# Patient Record
Sex: MALE | Race: WHITE | Employment: UNEMPLOYED | URBAN - NONMETROPOLITAN AREA
[De-identification: names, ages, dates, MRNs, and addresses within clinical notes are randomized per-mention and may not be internally consistent; named-entity substitution may affect disease eponyms.]

---

## 2017-08-09 ENCOUNTER — HOSPITAL ENCOUNTER (EMERGENCY)
Age: 57
Discharge: HOME | End: 2017-08-09
Payer: MEDICAID

## 2017-08-09 ENCOUNTER — APPOINTMENT (OUTPATIENT)
Dept: CT IMAGING | Age: 57
End: 2017-08-09
Payer: MEDICAID

## 2017-08-09 VITALS
HEIGHT: 72 IN | HEART RATE: 110 BPM | OXYGEN SATURATION: 96 % | WEIGHT: 150 LBS | SYSTOLIC BLOOD PRESSURE: 171 MMHG | RESPIRATION RATE: 18 BRPM | DIASTOLIC BLOOD PRESSURE: 106 MMHG | TEMPERATURE: 97.7 F | BODY MASS INDEX: 20.32 KG/M2

## 2017-08-09 DIAGNOSIS — F10.920 ACUTE ALCOHOLIC INTOXICATION, UNCOMPLICATED (HCC): ICD-10-CM

## 2017-08-09 DIAGNOSIS — S01.511A LIP LACERATION, INITIAL ENCOUNTER: Primary | ICD-10-CM

## 2017-08-09 DIAGNOSIS — S09.90XA HEAD INJURY, INITIAL ENCOUNTER: ICD-10-CM

## 2017-08-09 DIAGNOSIS — S09.93XA DENTAL INJURY, INITIAL ENCOUNTER: ICD-10-CM
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
## 2017-08-09 PROCEDURE — 70450 CT HEAD/BRAIN W/O DYE: CPT

## 2017-08-09 PROCEDURE — 6360000002 HC RX W HCPCS: Performed by: PHYSICIAN ASSISTANT

## 2017-08-09 PROCEDURE — 90715 TDAP VACCINE 7 YRS/> IM: CPT | Performed by: PHYSICIAN ASSISTANT

## 2017-08-09 PROCEDURE — 72125 CT NECK SPINE W/O DYE: CPT

## 2017-08-09 PROCEDURE — 90471 IMMUNIZATION ADMIN: CPT | Performed by: PHYSICIAN ASSISTANT

## 2017-08-09 PROCEDURE — 99284 EMERGENCY DEPT VISIT MOD MDM: CPT

## 2017-08-09 PROCEDURE — 2500000003 HC RX 250 WO HCPCS

## 2017-08-09 PROCEDURE — 12013 RPR F/E/E/N/L/M 2.6-5.0 CM: CPT

## 2017-08-09 RX ORDER — PENICILLIN V POTASSIUM 500 MG/1
500 TABLET ORAL 3 TIMES DAILY
Qty: 21 TABLET | Refills: 0 | Status: SHIPPED | OUTPATIENT
Start: 2017-08-09 | End: 2017-08-16

## 2017-08-09 RX ORDER — LIDOCAINE HYDROCHLORIDE 10 MG/ML
INJECTION, SOLUTION INFILTRATION; PERINEURAL
Status: COMPLETED
Start: 2017-08-09 | End: 2017-08-09

## 2017-08-09 RX ADMIN — LIDOCAINE HYDROCHLORIDE: 10 INJECTION, SOLUTION INFILTRATION; PERINEURAL at 18:40

## 2017-08-09 RX ADMIN — TETANUS TOXOID, REDUCED DIPHTHERIA TOXOID AND ACELLULAR PERTUSSIS VACCINE, ADSORBED 0.5 ML: 5; 2.5; 8; 8; 2.5 SUSPENSION INTRAMUSCULAR at 16:55

## 2017-08-09 ASSESSMENT — ENCOUNTER SYMPTOMS
VOMITING: 0
FACIAL SWELLING: 0
EYE REDNESS: 0
EYE DISCHARGE: 0
RHINORRHEA: 0
COLOR CHANGE: 0
NAUSEA: 0
COUGH: 0
STRIDOR: 0
PHOTOPHOBIA: 0
ABDOMINAL DISTENTION: 0
SHORTNESS OF BREATH: 0
DIARRHEA: 0

## 2017-08-09 ASSESSMENT — PAIN SCALES - GENERAL: PAINLEVEL_OUTOF10: 10

## 2017-08-09 ASSESSMENT — PAIN DESCRIPTION - LOCATION: LOCATION: MOUTH

## 2017-08-09 ASSESSMENT — PAIN DESCRIPTION - DESCRIPTORS: DESCRIPTORS: THROBBING

## 2017-08-09 ASSESSMENT — PAIN DESCRIPTION - PAIN TYPE: TYPE: ACUTE PAIN

## 2017-08-16 ENCOUNTER — HOSPITAL ENCOUNTER (EMERGENCY)
Age: 57
Discharge: HOME | End: 2017-08-16
Payer: MEDICAID

## 2017-08-16 VITALS
TEMPERATURE: 98 F | RESPIRATION RATE: 18 BRPM | SYSTOLIC BLOOD PRESSURE: 137 MMHG | HEART RATE: 110 BPM | DIASTOLIC BLOOD PRESSURE: 96 MMHG | OXYGEN SATURATION: 98 %

## 2017-08-16 DIAGNOSIS — Z48.02 VISIT FOR SUTURE REMOVAL: Primary | ICD-10-CM

## 2017-08-16 PROCEDURE — 99281 EMR DPT VST MAYX REQ PHY/QHP: CPT

## 2017-08-16 ASSESSMENT — ENCOUNTER SYMPTOMS
RHINORRHEA: 0
ABDOMINAL PAIN: 0
NAUSEA: 0
VOMITING: 0
DIARRHEA: 0
EYE DISCHARGE: 0
SHORTNESS OF BREATH: 0
SORE THROAT: 0
EYE REDNESS: 0
BACK PAIN: 0
COUGH: 0
WHEEZING: 0

## 2018-07-01 ENCOUNTER — HOSPITAL ENCOUNTER (EMERGENCY)
Age: 58
Discharge: HOME | End: 2018-07-01
Attending: EMERGENCY MEDICINE
Payer: COMMERCIAL

## 2018-07-01 VITALS
OXYGEN SATURATION: 98 % | SYSTOLIC BLOOD PRESSURE: 146 MMHG | TEMPERATURE: 98.1 F | RESPIRATION RATE: 16 BRPM | HEART RATE: 103 BPM | DIASTOLIC BLOOD PRESSURE: 97 MMHG

## 2018-07-01 DIAGNOSIS — L23.7 POISON IVY: Primary | ICD-10-CM

## 2018-07-01 PROCEDURE — 96372 THER/PROPH/DIAG INJ SC/IM: CPT

## 2018-07-01 PROCEDURE — 99282 EMERGENCY DEPT VISIT SF MDM: CPT

## 2018-07-01 PROCEDURE — 6360000002 HC RX W HCPCS: Performed by: EMERGENCY MEDICINE

## 2018-07-01 RX ORDER — HYDROXYZINE HYDROCHLORIDE 25 MG/1
25 TABLET, FILM COATED ORAL EVERY 6 HOURS PRN
Qty: 8 TABLET | Refills: 0 | Status: SHIPPED | OUTPATIENT
Start: 2018-07-01 | End: 2018-07-11

## 2018-07-01 RX ORDER — METHYLPREDNISOLONE SODIUM SUCCINATE 125 MG/2ML
125 INJECTION, POWDER, LYOPHILIZED, FOR SOLUTION INTRAMUSCULAR; INTRAVENOUS ONCE
Status: COMPLETED | OUTPATIENT
Start: 2018-07-01 | End: 2018-07-01

## 2018-07-01 RX ORDER — PREDNISONE 20 MG/1
20 TABLET ORAL 2 TIMES DAILY
Qty: 10 TABLET | Refills: 0 | Status: SHIPPED | OUTPATIENT
Start: 2018-07-01 | End: 2018-07-06

## 2018-07-01 RX ADMIN — METHYLPREDNISOLONE SODIUM SUCCINATE 125 MG: 125 INJECTION, POWDER, FOR SOLUTION INTRAMUSCULAR; INTRAVENOUS at 09:20

## 2018-07-01 NOTE — ED PROVIDER NOTES
appearing  oropharynx moist, no pharyngeal exudates. Neck- normal range of motion, supple   Respiratory:  No wheezing, rhonchi or rales  Cardiovascular: regular, no murmur  GI:  Non tender, no rigidity, rebound or guarding  Integument:  He has erythema with small areas of blistering on his hands particularly between the digits as well as on his face where he rubbed. Neurologic:  Alert & oriented x 3        DIAGNOSTIC RESULTS         LABS:   Labs Reviewed - No data to display    EMERGENCY DEPARTMENT COURSE:   Vitals:    Vitals:    07/01/18 0757   BP: (!) 146/97   Pulse: 103   Resp: 16   Temp: 98.1 °F (36.7 °C)   TempSrc: Oral   SpO2: 98%     He does not of any respiratory issues. There is no stridor. Posterior pharynx looks normal.  He is not having any shortness of breath or hypotension. No evidence of anaphylaxis. We will put him on some steroids for the next few days. He requested a shot while here and he received a dose of Solu-Medrol. CRITICAL CARE:   none      FINAL IMPRESSION      1. Poison ivy          DISPOSITION/PLAN   Discharged    DISCHARGE MEDICATIONS:  New Prescriptions    HYDROXYZINE (ATARAX) 25 MG TABLET    Take 1 tablet by mouth every 6 hours as needed for Itching    PREDNISONE (DELTASONE) 20 MG TABLET    Take 1 tablet by mouth 2 times daily for 5 days       (Please note that portions of this note were completed with a voice recognition program.  Efforts were made to edit the dictations but occasionally words are mis-transcribed. )    Jerry Navas    Scribe:  Jerry Navas 7/1/18 9:12 AM Scribing for and in the presence of Ronit Nielsen DO.    [unfilled]    Provider:  I personally performed the services described in the documentation, reviewed and edited the documentation which was dictated to the scribe in my presence, and it accurately records my words and actions.     Ronit Nielsen DO 07/01/18 9:24 AM        Ronit Nielsen DO  07/01/18 7045

## 2018-07-01 NOTE — ED NOTES
Patient to ed room 3. Patient complain of poison ivy, vital signs obtained and assessment completed.      Connie Khanna RN  07/01/18 6203

## 2018-08-03 ENCOUNTER — HOSPITAL ENCOUNTER (EMERGENCY)
Age: 58
Discharge: HOME | End: 2018-08-03
Payer: COMMERCIAL

## 2018-08-03 VITALS
BODY MASS INDEX: 21.67 KG/M2 | RESPIRATION RATE: 16 BRPM | WEIGHT: 160 LBS | HEIGHT: 72 IN | OXYGEN SATURATION: 97 % | SYSTOLIC BLOOD PRESSURE: 165 MMHG | HEART RATE: 115 BPM | TEMPERATURE: 97.9 F | DIASTOLIC BLOOD PRESSURE: 93 MMHG

## 2018-08-03 DIAGNOSIS — S81.812A LACERATION OF LEFT LOWER EXTREMITY, INITIAL ENCOUNTER: Primary | ICD-10-CM

## 2018-08-03 PROCEDURE — 6360000002 HC RX W HCPCS: Performed by: PHYSICIAN ASSISTANT

## 2018-08-03 PROCEDURE — 2500000003 HC RX 250 WO HCPCS: Performed by: PHYSICIAN ASSISTANT

## 2018-08-03 PROCEDURE — 90471 IMMUNIZATION ADMIN: CPT | Performed by: PHYSICIAN ASSISTANT

## 2018-08-03 PROCEDURE — 90715 TDAP VACCINE 7 YRS/> IM: CPT | Performed by: PHYSICIAN ASSISTANT

## 2018-08-03 PROCEDURE — 2709999900 HC NON-CHARGEABLE SUPPLY

## 2018-08-03 PROCEDURE — 12002 RPR S/N/AX/GEN/TRNK2.6-7.5CM: CPT

## 2018-08-03 PROCEDURE — 99282 EMERGENCY DEPT VISIT SF MDM: CPT

## 2018-08-03 RX ORDER — NAPROXEN 500 MG/1
500 TABLET ORAL 2 TIMES DAILY
Qty: 60 TABLET | Refills: 0 | Status: SHIPPED | OUTPATIENT
Start: 2018-08-03 | End: 2018-08-17

## 2018-08-03 RX ORDER — CEPHALEXIN 500 MG/1
500 CAPSULE ORAL 4 TIMES DAILY
Qty: 40 CAPSULE | Refills: 0 | Status: SHIPPED | OUTPATIENT
Start: 2018-08-03 | End: 2018-08-13

## 2018-08-03 RX ORDER — LIDOCAINE HYDROCHLORIDE AND EPINEPHRINE 10; 10 MG/ML; UG/ML
20 INJECTION, SOLUTION INFILTRATION; PERINEURAL ONCE
Status: COMPLETED | OUTPATIENT
Start: 2018-08-03 | End: 2018-08-03

## 2018-08-03 RX ADMIN — TETANUS TOXOID, REDUCED DIPHTHERIA TOXOID AND ACELLULAR PERTUSSIS VACCINE, ADSORBED 0.5 ML: 5; 2.5; 8; 8; 2.5 SUSPENSION INTRAMUSCULAR at 11:42

## 2018-08-03 RX ADMIN — LIDOCAINE HYDROCHLORIDE,EPINEPHRINE BITARTRATE 20 ML: 10; .01 INJECTION, SOLUTION INFILTRATION; PERINEURAL at 11:41

## 2018-08-03 ASSESSMENT — ENCOUNTER SYMPTOMS
VOMITING: 0
SHORTNESS OF BREATH: 0
NAUSEA: 0
BACK PAIN: 0
RHINORRHEA: 0
PHOTOPHOBIA: 0

## 2018-08-03 ASSESSMENT — PAIN SCALES - GENERAL: PAINLEVEL_OUTOF10: 0

## 2018-08-03 ASSESSMENT — PAIN DESCRIPTION - ORIENTATION: ORIENTATION: LEFT

## 2018-08-03 ASSESSMENT — PAIN DESCRIPTION - LOCATION: LOCATION: KNEE

## 2018-08-03 NOTE — ED PROVIDER NOTES
[facility]  eMERGENCY dEPARTMENT eNCOUnter          CHIEF COMPLAINT       Chief Complaint   Patient presents with    Leg Pain     left       Nurses Notes reviewed and I agree except as noted in the HPI. HISTORY OF PRESENT ILLNESS    John Blair is a 56 y.o. male who presents to the ED for evaluation of a left knee laceration. The patient was using a chainsaw to cut a bush and weeds this morning, he swiped downwards with the chainsaw and accidentally cut his left knee. The patient denies any pain associated with the laceration. He denies any other areas of pain or injury. The patient does not know when his tetanus was last updated. No additional complaints or concerns at the time of initial evaluation. Location/Symptom: laceration to left knee   Timing/Onset: 15 minutes PTA  Context/Setting: using chainsaw to cut bushes   Severity: mild      REVIEW OF SYSTEMS     Review of Systems   Constitutional: Negative for fever. HENT: Negative for rhinorrhea. Eyes: Negative for photophobia. Respiratory: Negative for shortness of breath. Cardiovascular: Negative for chest pain. Gastrointestinal: Negative for nausea and vomiting. Musculoskeletal: Negative for arthralgias, back pain, joint swelling, myalgias and neck pain. Skin: Positive for wound (laceration). Negative for rash. Neurological: Negative for weakness and numbness. Psychiatric/Behavioral: Negative for confusion. PAST MEDICAL HISTORY    has a past medical history of Arthritis and Substance induced mood disorder ([facility] [address] SURGICAL HISTORY      has a past surgical history that includes Leg Surgery and back surgery. CURRENT MEDICATIONS       Discharge Medication List as of 8/3/2018 12:37 PM          ALLERGIES     has No Known Allergies. FAMILY HISTORY     indicated that his mother is . He indicated that his father is .     family history includes Cancer in his father; Heart Attack in his 1300   BP: (!) 165/93    Pulse: 115    Resp: 16    Temp:  97.9 °F (36.6 °C)   TempSrc:  Oral   SpO2: 97%    Weight: 160 lb (72.6 kg)    Height: 6' (1.829 m)           Patient was seen and evaluation in the ED for a laceration. History and physical exam were performed. There was a 3cm laceration to the lateral aspect of the left knee with no tendon, bony, or vascular compromise. The wound was cleaned and closed with sutures. He was instructed to keep the wound clean and dry for the next 24 hours and then can clean gently with soapy water. He was instructed to follow up with his PCP in 7-10 days for suture removal. Tetanus was updated in the ED. Upon re-evaluation, the patient is feeling better with a benign repeat examination. Patient was comfortable with the plan of discharge home to followup with the primary care provider in the next 2 days. He was started on Keflex upon discharge. The patient is instructed to return to the emergency department for any worsening of their symptoms. Patient was discharged from the emergency department in good condition with all questions answered. See disposition below    Medications   lidocaine-EPINEPHrine 1 percent-[id number] injection 20 mL (20 mLs Intradermal Given by Other 8/3/18 1141)   Tetanus-Diphth-Acell Pertussis (BOOSTRIX) injection 0.5 mL (0.5 mLs Intramuscular Given 8/3/18 1142)       CRITICAL CARE:   None    CONSULTS:  None    PROCEDURES:  Lac Repair  Date/Time: 8/3/2018 11:34 AM  Performed by: Brittney Lyons  Authorized by: Brittney Lyons     Consent:     Consent obtained:  Verbal and written    Consent given by:  Patient  Anesthesia (see MAR for exact dosages):      Anesthesia method:  Local infiltration    Local anesthetic:  Lidocaine 1% WITH epi  Laceration details:     Location:  Leg    Leg location:  L knee    Length (cm):  3  Repair type:     Repair type:  Simple  Pre-procedure details:     Preparation:  Patient was prepped and draped in usual sterile fashion  Exploration:     Wound extent: no foreign bodies/material noted, no tendon damage noted and no vascular damage noted    Treatment:     Wound cleansed with: wound cleanser. Amount of cleaning:  Standard    Irrigation solution:  Sterile saline    Irrigation method:  Syringe  Skin repair:     Repair method:  Sutures    Suture size:  4-0    Suture material:  Nylon    Suture technique:  Simple interrupted    Number of sutures:  8  Approximation:     Approximation:  Close  Post-procedure details:     Dressing:  Non-adherent dressing    Patient tolerance of procedure: Tolerated well, no immediate complications               FINAL IMPRESSION      1. Laceration of left lower extremity, initial encounter          DISPOSITION/PLAN   Discharged     PATIENT REFERRED TO:  [facility] EMERGENCY DEPT  [address]  [phone]  In 12 days  For suture removal      DISCHARGE MEDICATIONS:  Discharge Medication List as of 8/3/2018 12:37 PM      START taking these medications    Details   cephALEXin (KEFLEX) 500 MG capsule Take 1 capsule by mouth 4 times daily for 10 days, Disp-40 capsule, R-0Print      naproxen (NAPROSYN) 500 MG tablet Take 1 tablet by mouth 2 times daily, Disp-60 tablet, R-0Print             (Please note that portions of this note were completed with a voice recognition program.  Efforts were made to edit the dictations but occasionally words are mis-transcribed.)      This patient was seen independently by Colleen Kwon PA-C a Mid-Level Provider in the [facility] Emergency Department. The patient was given an opportunity to see the Emergency Attending. The patient voiced understanding that I was a Mid-Level Provider and was in agreement with being seen independently by myself. Signed by: Arlen Grant, 08/03/18 2:07 PM    Scribe:  Luigi Worrell 8/3/18 11:34 AM Scribing for and in the presence of Colleen Kwon PA-C.     Provider:  I personally performed the services described in the documentation, reviewed and edited the documentation which was dictated to the scribe in my presence, and it accurately records my words and actions.     Colleen Kwon PA-C 8/3/18 2:07 PM            ZENAIDA Duggan  08/03/18 4845

## 2018-08-03 NOTE — ED NOTES
Adaptic dressing, 4x4's, kerlix, and tape applied to wound. Patient tolerated well.       Chetna Fallon RN  08/03/18 5118

## 2018-08-17 ENCOUNTER — HOSPITAL ENCOUNTER (EMERGENCY)
Age: 58
Discharge: HOME | End: 2018-08-17
Payer: COMMERCIAL

## 2018-08-17 VITALS
WEIGHT: 165 LBS | SYSTOLIC BLOOD PRESSURE: 146 MMHG | DIASTOLIC BLOOD PRESSURE: 91 MMHG | HEIGHT: 72 IN | HEART RATE: 86 BPM | BODY MASS INDEX: 22.35 KG/M2 | TEMPERATURE: 98.1 F | RESPIRATION RATE: 17 BRPM | OXYGEN SATURATION: 97 %

## 2018-08-17 DIAGNOSIS — Z48.02 VISIT FOR SUTURE REMOVAL: Primary | ICD-10-CM

## 2018-08-17 PROCEDURE — 99281 EMR DPT VST MAYX REQ PHY/QHP: CPT

## 2018-08-17 ASSESSMENT — ENCOUNTER SYMPTOMS
SORE THROAT: 0
COUGH: 0
DIARRHEA: 0
VOMITING: 0
HEMOPTYSIS: 0
NAUSEA: 0

## 2018-08-17 NOTE — ED PROVIDER NOTES
[facility]  eMERGENCY dEPARTMENT eNCOUnter          [address] [facility]       Chief Complaint   Patient presents with    Suture / Staple Removal       Nurses Notes reviewed and I agree except as noted in the HPI. HISTORY OF PRESENT ILLNESS    Cole Galeazzi is a 56 y.o. male who presents Requesting sutures removed from his left knee. They have been in for approximately 2 weeks. He has not had any issues with them      REVIEW OF SYSTEMS     Review of Systems   Constitutional: Negative for chills and fever. HENT: Negative for congestion, ear pain and sore throat. Respiratory: Negative for cough and hemoptysis. Cardiovascular: Negative for chest pain and palpitations. Gastrointestinal: Negative for diarrhea, nausea and vomiting. Genitourinary: Negative for dysuria and urgency. Musculoskeletal: Negative for myalgias and neck pain. Skin: Negative for rash. All other systems reviewed and are negative. PAST MEDICAL HISTORY    has a past medical history of Arthritis and Substance induced mood disorder ([facility] [address] SURGICAL HISTORY      has a past surgical history that includes Leg Surgery and back surgery. CURRENT MEDICATIONS       Discharge Medication List as of 2018  7:01 AM          ALLERGIES     has No Known Allergies. FAMILY HISTORY     indicated that his mother is . He indicated that his father is . family history includes Cancer in his father; Heart Attack in his mother. SOCIAL HISTORY      reports that he has been smoking. He has a 70.50 pack-year smoking history. He has never used smokeless tobacco. He reports that he drinks about 100.8 oz of alcohol per week . He reports that he does not use drugs. PHYSICAL EXAM     INITIAL VITALS:  height is 6' (1.829 m) and weight is 165 lb (74.8 kg). His oral temperature is 98.1 °F (36.7 °C). His blood pressure is 146/91 (abnormal) and his pulse is 86.  His respiration is 17 and oxygen saturation

## 2019-12-08 ENCOUNTER — HOSPITAL ENCOUNTER (EMERGENCY)
Age: 59
Discharge: HOME | End: 2019-12-08
Payer: COMMERCIAL

## 2019-12-08 ENCOUNTER — APPOINTMENT (OUTPATIENT)
Dept: GENERAL RADIOLOGY | Age: 59
End: 2019-12-08
Payer: COMMERCIAL

## 2019-12-08 VITALS
TEMPERATURE: 97.8 F | OXYGEN SATURATION: 99 % | HEART RATE: 103 BPM | SYSTOLIC BLOOD PRESSURE: 130 MMHG | DIASTOLIC BLOOD PRESSURE: 89 MMHG | RESPIRATION RATE: 16 BRPM

## 2019-12-08 DIAGNOSIS — M25.512 LEFT SHOULDER PAIN, UNSPECIFIED CHRONICITY: Primary | ICD-10-CM

## 2019-12-08 PROCEDURE — 96372 THER/PROPH/DIAG INJ SC/IM: CPT

## 2019-12-08 PROCEDURE — 99283 EMERGENCY DEPT VISIT LOW MDM: CPT

## 2019-12-08 PROCEDURE — 6360000002 HC RX W HCPCS: Performed by: NURSE PRACTITIONER

## 2019-12-08 PROCEDURE — 73030 X-RAY EXAM OF SHOULDER: CPT

## 2019-12-08 RX ORDER — ORPHENADRINE CITRATE 30 MG/ML
60 INJECTION INTRAMUSCULAR; INTRAVENOUS ONCE
Status: COMPLETED | OUTPATIENT
Start: 2019-12-08 | End: 2019-12-08

## 2019-12-08 RX ORDER — TIZANIDINE HYDROCHLORIDE 4 MG/1
4 CAPSULE, GELATIN COATED ORAL 3 TIMES DAILY PRN
Qty: 24 CAPSULE | Refills: 0 | Status: SHIPPED | OUTPATIENT
Start: 2019-12-08 | End: 2021-07-06

## 2019-12-08 RX ADMIN — ORPHENADRINE CITRATE 60 MG: 30 INJECTION INTRAMUSCULAR; INTRAVENOUS at 14:47

## 2019-12-08 ASSESSMENT — PAIN DESCRIPTION - PAIN TYPE: TYPE: CHRONIC PAIN

## 2019-12-08 ASSESSMENT — PAIN SCALES - GENERAL: PAINLEVEL_OUTOF10: 10

## 2019-12-08 ASSESSMENT — ENCOUNTER SYMPTOMS
NAUSEA: 0
VOMITING: 0
COLOR CHANGE: 0
BACK PAIN: 0

## 2019-12-08 ASSESSMENT — PAIN DESCRIPTION - LOCATION: LOCATION: SHOULDER

## 2019-12-08 ASSESSMENT — PAIN DESCRIPTION - DESCRIPTORS: DESCRIPTORS: SHARP

## 2019-12-08 ASSESSMENT — PAIN DESCRIPTION - ORIENTATION: ORIENTATION: LEFT

## 2020-01-30 ENCOUNTER — HOSPITAL ENCOUNTER (EMERGENCY)
Age: 60
Discharge: HOME | End: 2020-01-30
Payer: COMMERCIAL

## 2020-01-30 ENCOUNTER — APPOINTMENT (OUTPATIENT)
Dept: GENERAL RADIOLOGY | Age: 60
End: 2020-01-30
Payer: COMMERCIAL

## 2020-01-30 VITALS
BODY MASS INDEX: 22.38 KG/M2 | TEMPERATURE: 97.8 F | HEART RATE: 86 BPM | RESPIRATION RATE: 18 BRPM | WEIGHT: 165 LBS | DIASTOLIC BLOOD PRESSURE: 99 MMHG | SYSTOLIC BLOOD PRESSURE: 141 MMHG | OXYGEN SATURATION: 98 %

## 2020-01-30 PROCEDURE — 87186 SC STD MICRODIL/AGAR DIL: CPT

## 2020-01-30 PROCEDURE — 87075 CULTR BACTERIA EXCEPT BLOOD: CPT

## 2020-01-30 PROCEDURE — 87070 CULTURE OTHR SPECIMN AEROBIC: CPT

## 2020-01-30 PROCEDURE — 2709999900 HC NON-CHARGEABLE SUPPLY

## 2020-01-30 PROCEDURE — 87147 CULTURE TYPE IMMUNOLOGIC: CPT

## 2020-01-30 PROCEDURE — 99283 EMERGENCY DEPT VISIT LOW MDM: CPT

## 2020-01-30 PROCEDURE — 73660 X-RAY EXAM OF TOE(S): CPT

## 2020-01-30 PROCEDURE — 87205 SMEAR GRAM STAIN: CPT

## 2020-01-30 PROCEDURE — 87077 CULTURE AEROBIC IDENTIFY: CPT

## 2020-01-30 RX ORDER — CLINDAMYCIN HYDROCHLORIDE 300 MG/1
300 CAPSULE ORAL 3 TIMES DAILY
Qty: 30 CAPSULE | Refills: 0 | Status: SHIPPED | OUTPATIENT
Start: 2020-01-30 | End: 2020-02-09

## 2020-01-30 ASSESSMENT — PAIN DESCRIPTION - LOCATION: LOCATION: TOE (COMMENT WHICH ONE)

## 2020-01-30 ASSESSMENT — ENCOUNTER SYMPTOMS
NAUSEA: 0
ABDOMINAL PAIN: 0
SHORTNESS OF BREATH: 0
VOMITING: 0

## 2020-01-30 ASSESSMENT — PAIN DESCRIPTION - ORIENTATION: ORIENTATION: LEFT

## 2020-01-30 ASSESSMENT — PAIN SCALES - GENERAL: PAINLEVEL_OUTOF10: 10

## 2020-01-30 ASSESSMENT — PAIN DESCRIPTION - PAIN TYPE: TYPE: ACUTE PAIN

## 2020-01-30 NOTE — ED TRIAGE NOTES
Pt to the ED with left big toe pain. States he had a surgery on it 5 years ago and has a jaleesa in place. Notes swelling, redness, and pus d/c. Odor noted. Pain 10/10.
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
## 2020-02-03 LAB
AEROBIC CULTURE: ABNORMAL
ANAEROBIC CULTURE: ABNORMAL
GRAM STAIN RESULT: ABNORMAL
ORGANISM: ABNORMAL
ORGANISM: ABNORMAL

## 2020-02-04 NOTE — PROGRESS NOTES
Pharmacy Note  ED Culture Follow-up    Claudia Meeks is a 64 y.o. male. Allergies: Patient has no known allergies. Labs:  Lab Results   Component Value Date    BUN 5 (L) 06/27/2017    CREATININE 0.7 06/27/2017    WBC 8.0 06/27/2017     CrCl cannot be calculated (Patient's most recent lab result is older than the maximum 10 days allowed. ). Current antimicrobials:   clindamycin    ASSESSMENT:  Micro results:   Wound culture: positive for mixed growth      PLAN:  Need for intervention: No 2/2 on clindamycin   Discussed with: SUSI Kennedy  Chosen treatment:    Patient already on appropriate treatment as above    Patient response:   No need to contact patient    Called/sent in prescription to: Not applicable    Please call with any questions.  Ext. [phone]

## 2020-05-11 ENCOUNTER — HOSPITAL ENCOUNTER (EMERGENCY)
Age: 60
Discharge: HOME | End: 2020-05-11
Payer: COMMERCIAL

## 2020-05-11 VITALS
OXYGEN SATURATION: 97 % | HEART RATE: 89 BPM | TEMPERATURE: 98 F | RESPIRATION RATE: 18 BRPM | WEIGHT: 165 LBS | DIASTOLIC BLOOD PRESSURE: 97 MMHG | SYSTOLIC BLOOD PRESSURE: 156 MMHG | BODY MASS INDEX: 22.38 KG/M2

## 2020-05-11 PROCEDURE — 99282 EMERGENCY DEPT VISIT SF MDM: CPT

## 2020-05-11 RX ORDER — PERMETHRIN 50 MG/G
CREAM TOPICAL
Qty: 1 TUBE | Refills: 0 | Status: SHIPPED | OUTPATIENT
Start: 2020-05-11 | End: 2020-09-10 | Stop reason: SDUPTHER

## 2020-05-11 RX ORDER — DIPHENHYDRAMINE HCL 25 MG
25 CAPSULE ORAL EVERY 6 HOURS PRN
Qty: 30 CAPSULE | Refills: 0 | Status: SHIPPED | OUTPATIENT
Start: 2020-05-11 | End: 2021-07-06

## 2020-05-11 RX ORDER — TRIAMCINOLONE ACETONIDE 5 MG/G
CREAM TOPICAL
Qty: 1 TUBE | Refills: 0 | Status: SHIPPED | OUTPATIENT
Start: 2020-05-11 | End: 2020-05-18

## 2020-05-11 ASSESSMENT — ENCOUNTER SYMPTOMS
ABDOMINAL PAIN: 0
SORE THROAT: 0
COUGH: 0
SHORTNESS OF BREATH: 0

## 2020-05-11 ASSESSMENT — PAIN DESCRIPTION - PAIN TYPE: TYPE: ACUTE PAIN

## 2020-05-11 ASSESSMENT — PAIN DESCRIPTION - FREQUENCY: FREQUENCY: CONTINUOUS

## 2020-05-11 ASSESSMENT — PAIN SCALES - GENERAL: PAINLEVEL_OUTOF10: 10

## 2020-05-11 NOTE — ED PROVIDER NOTES
Details   tiZANidine (ZANAFLEX) 4 MG capsule Take 1 capsule by mouth 3 times daily as needed for Muscle spasms, Disp-24 capsule, R-0Print             ALLERGIES     has No Known Allergies. FAMILY HISTORY     He indicated that his mother is . He indicated that his father is . family history includes Cancer in his father; Heart Attack in his mother. SOCIAL HISTORY      reports that he has been smoking. He has a 70.50 pack-year smoking history. He has never used smokeless tobacco. He reports current alcohol use of about 168.0 standard drinks of alcohol per week. He reports that he does not use drugs. PHYSICAL EXAM     INITIAL VITALS:  weight is 165 lb (74.8 kg). His oral temperature is 98 °F (36.7 °C). His blood pressure is 156/97 (abnormal) and his pulse is 89. His respiration is 18 and oxygen saturation is 97%. Physical Exam  Vitals signs and nursing note reviewed. Constitutional:       Appearance: Normal appearance. HENT:      Head: Normocephalic and atraumatic. Eyes:      Conjunctiva/sclera: Conjunctivae normal.   Pulmonary:      Effort: Pulmonary effort is normal. No respiratory distress. Skin:     General: Skin is warm and dry. Comments: Multiple excoriated, papular wounds noted to the upper back, neck, cheeks and lower scalp. There is no linear streaking, visible tunneling, or scaling associated with the areas. No sign of secondary cellulitis. Neurological:      General: No focal deficit present. Mental Status: He is alert and oriented to person, place, and time.    Psychiatric:         Mood and Affect: Mood normal.         Behavior: Behavior normal.             DIFFERENTIAL DIAGNOSIS:   Differential diagnoses are discussed    DIAGNOSTIC RESULTS     EKG: All EKG's are interpreted by the Emergency Department Physician who either signs or Co-signsthis chart in the absence of a cardiologist.          RADIOLOGY: non-plain film images(s) such as CT, Ultrasound and MRI are read by the radiologist.    No orders to display       LABS:    Labs Reviewed - No data to display    EMERGENCY DEPARTMENT COURSE:   Vitals:    Vitals:    05/11/20 1259   BP: (!) 156/97   Pulse: 89   Resp: 18   Temp: 98 °F (36.7 °C)   TempSrc: Oral   SpO2: 97%   Weight: 165 lb (74.8 kg)      3:09 PM EDT: The patient was seen and evaluated. Patient presents for complaints of rash that has been ongoing for at least 1 month. No treatment prior to today and he has not been seen for evaluation prior to today. No significant change today, it has just been progressively getting worse. It appears consistent with bug bites on evaluation. Will treat with Benadryl, Kenalog and also provide scabies treatment. He is provided with contact information to establish primary care provider, declined having an appointment scheduled for him. Return precautions were discussed. Hygiene practices discussed. He denied further needs upon discharge. CRITICAL CARE:   None    CONSULTS:  None     PROCEDURES:  None    FINAL IMPRESSION      1. Bug bite, initial encounter          DISPOSITION/PLAN   Discharge    PATIENT REFERRED TO:  [address] [place] [address],[address]  Call in 1 day  As needed    [address] [facility] [address] [address] EMERGENCY DEPT  [address]    If symptoms worsen      DISCHARGEMEDICATIONS:  Discharge Medication List as of 5/11/2020  1:21 PM      START taking these medications    Details   permethrin (ELIMITE) 5 % cream Apply topically as directed, Disp-1 Tube, R-0, Print      triamcinolone (ARISTOCORT) 0.5 % cream Apply topically 3 times daily. , Disp-1 Tube, R-0, Print      diphenhydrAMINE (BENADRYL ALLERGY) 25 MG capsule Take 1 capsule by mouth every 6 hours as needed for Itching, Disp-30 capsule, R-0Print             (Please note that portions of this note were completedwith a voice recognition program.  Efforts were made to edit the dictations but occasionally words are mis-transcribed.)       Asha Hernandez PA-C  05/11/20 1395

## 2020-09-10 ENCOUNTER — HOSPITAL ENCOUNTER (EMERGENCY)
Age: 60
Discharge: HOME | End: 2020-09-10
Attending: EMERGENCY MEDICINE
Payer: COMMERCIAL

## 2020-09-10 VITALS
BODY MASS INDEX: 21.67 KG/M2 | DIASTOLIC BLOOD PRESSURE: 90 MMHG | TEMPERATURE: 98.1 F | WEIGHT: 160 LBS | SYSTOLIC BLOOD PRESSURE: 184 MMHG | HEART RATE: 93 BPM | RESPIRATION RATE: 18 BRPM | OXYGEN SATURATION: 98 % | HEIGHT: 72 IN

## 2020-09-10 LAB
ALBUMIN SERPL-MCNC: 4.4 G/DL (ref 3.5–5.1)
ALP BLD-CCNC: 61 U/L (ref 38–126)
ALT SERPL-CCNC: 80 U/L (ref 11–66)
ANION GAP SERPL CALCULATED.3IONS-SCNC: 13 MEQ/L (ref 8–16)
APTT: 32.8 SECONDS (ref 22–38)
AST SERPL-CCNC: 152 U/L (ref 5–40)
BASOPHILS # BLD: 1.5 %
BASOPHILS ABSOLUTE: 0.1 THOU/MM3 (ref 0–0.1)
BILIRUB SERPL-MCNC: 0.6 MG/DL (ref 0.3–1.2)
BUN BLDV-MCNC: < 2 MG/DL (ref 7–22)
CALCIUM SERPL-MCNC: 9.7 MG/DL (ref 8.5–10.5)
CHLORIDE BLD-SCNC: 98 MEQ/L (ref 98–111)
CO2: 24 MEQ/L (ref 23–33)
CREAT SERPL-MCNC: 0.6 MG/DL (ref 0.4–1.2)
EOSINOPHIL # BLD: 4.6 %
EOSINOPHILS ABSOLUTE: 0.3 THOU/MM3 (ref 0–0.4)
ERYTHROCYTE [DISTWIDTH] IN BLOOD BY AUTOMATED COUNT: 12.4 % (ref 11.5–14.5)
ERYTHROCYTE [DISTWIDTH] IN BLOOD BY AUTOMATED COUNT: 46.9 FL (ref 35–45)
GFR SERPL CREATININE-BSD FRML MDRD: > 90 ML/MIN/1.73M2
GLUCOSE BLD-MCNC: 96 MG/DL (ref 70–108)
HCT VFR BLD CALC: 45.1 % (ref 42–52)
HEMOGLOBIN: 15.9 GM/DL (ref 14–18)
IMMATURE GRANS (ABS): 0.04 THOU/MM3 (ref 0–0.07)
IMMATURE GRANULOCYTES: 0.6 %
LYMPHOCYTES # BLD: 23.9 %
LYMPHOCYTES ABSOLUTE: 1.6 THOU/MM3 (ref 1–4.8)
MCH RBC QN AUTO: 36.3 PG (ref 26–33)
MCHC RBC AUTO-ENTMCNC: 35.3 GM/DL (ref 32.2–35.5)
MCV RBC AUTO: 103 FL (ref 80–94)
MONOCYTES # BLD: 18.8 %
MONOCYTES ABSOLUTE: 1.3 THOU/MM3 (ref 0.4–1.3)
NUCLEATED RED BLOOD CELLS: 0 /100 WBC
OSMOLALITY CALCULATION: 266.1 MOSMOL/KG (ref 275–300)
PLATELET # BLD: 127 THOU/MM3 (ref 130–400)
PMV BLD AUTO: 10 FL (ref 9.4–12.4)
POTASSIUM SERPL-SCNC: 4.4 MEQ/L (ref 3.5–5.2)
RBC # BLD: 4.38 MILL/MM3 (ref 4.7–6.1)
SEG NEUTROPHILS: 50.6 %
SEGMENTED NEUTROPHILS ABSOLUTE COUNT: 3.4 THOU/MM3 (ref 1.8–7.7)
SODIUM BLD-SCNC: 135 MEQ/L (ref 135–145)
TOTAL PROTEIN: 8.4 G/DL (ref 6.1–8)
WBC # BLD: 6.8 THOU/MM3 (ref 4.8–10.8)

## 2020-09-10 PROCEDURE — 99284 EMERGENCY DEPT VISIT MOD MDM: CPT

## 2020-09-10 PROCEDURE — 36415 COLL VENOUS BLD VENIPUNCTURE: CPT

## 2020-09-10 PROCEDURE — 85025 COMPLETE CBC W/AUTO DIFF WBC: CPT

## 2020-09-10 PROCEDURE — 85730 THROMBOPLASTIN TIME PARTIAL: CPT

## 2020-09-10 PROCEDURE — 80053 COMPREHEN METABOLIC PANEL: CPT

## 2020-09-10 PROCEDURE — 99283 EMERGENCY DEPT VISIT LOW MDM: CPT

## 2020-09-10 RX ORDER — IVERMECTIN 3 MG/1
200 TABLET ORAL ONCE
Qty: 4 TABLET | Refills: 0 | Status: SHIPPED | OUTPATIENT
Start: 2020-09-10 | End: 2020-09-10

## 2020-09-10 RX ORDER — PERMETHRIN 50 MG/G
CREAM TOPICAL
Qty: 1 TUBE | Refills: 0 | Status: SHIPPED | OUTPATIENT
Start: 2020-09-10 | End: 2021-07-06

## 2020-09-10 RX ORDER — IVERMECTIN 3 MG/1
200 TABLET ORAL ONCE
Status: DISCONTINUED | OUTPATIENT
Start: 2020-09-10 | End: 2020-09-10 | Stop reason: RX

## 2020-09-10 ASSESSMENT — ENCOUNTER SYMPTOMS
EYE REDNESS: 0
SHORTNESS OF BREATH: 0
NAUSEA: 0
BACK PAIN: 0
COUGH: 0
VOMITING: 0
SINUS PAIN: 0
BLOOD IN STOOL: 1
RHINORRHEA: 0
DIARRHEA: 0
ABDOMINAL PAIN: 0
SORE THROAT: 0

## 2020-09-10 NOTE — ED PROVIDER NOTES
[address] [facility]  EMERGENCY MEDICINE ATTENDING ATTESTATION      Evaluation of Rubén Harrison. Case discussed and care plan developed with resident physician. I agree with the note and plan as documented by him, except if my documentation differs. Patient seen and examined by me. I reviewed the medical, surgical, family and social history, medications and allergies. I have reviewed the nursing documentation. I have reviewed the patient's vital signs and are abnormal from Hypertension per my interpretation. Pulsoxymetry is normal per my interpretation. Brief H&P   Patient c/o 1 episode of bloody bowel movement earlier today. Patient's main reason to come to the hospital is a generalized pruritic rash that has been treated for scabies in the past but he has not treated his clothes. Physical exam is notable for well appearing, Abdomen is soft, non-tender, non-distended, BS positive in 4 quadrants, no HSM, no masses. Generalized pruritic rash with excoriations with micropapules. Medical Decision Making   MDM: Hematochezia, scabies. Plan: Symptomatic treatment, will re-treat for scabies and advised to properly treat his clothing and bed sheets. Please see the resident physician completed note for final disposition except as documented on this attestation. Diagnosis, treatment and disposition plans were discussed and agreed upon by patient. This transcription was electronically signed. It was dictated by use of voice recognition software and electronically transcribed. The transcription may contain errors not detected in proofreading.        Electronically signed by Eleuterio Becker MD on 9/10/20 at 3:49 PM EDT       Eleuterio Becker MD  09/10/20 9453

## 2020-09-10 NOTE — ED PROVIDER NOTES
[place] ENCOUNTER          Pt Name: Rosario Agustin  MRN: [id number]  [place] 1960  Date of evaluation: 9/10/2020  Treating Resident Physician: Hugo Booth MD  Supervising Physician: Dr. Dorita Ag       Chief Complaint   Patient presents with    Rectal Bleeding     X1 year     Rash     Dx with \"scabies\" one month ago      History obtained from the patient. HISTORY OF PRESENT ILLNESS    HPI  Rosario Agustin is a 64 y.o. male who presents to the emergency department for evaluation of his episode of GI bleed that occurred this morning. Patient states the blood was noted to be on toilet paper as well as large amounts in the toilet and mixed in with the stool. Patient states he has had incidence of this over the past year but has not told anyone. He denies feeling any lightheadedness, dizzy or vision changes. Patient however does mention having significant complaints regarding his scabies that was diagnosed back in May and managed with permethrin cream and triamcinolone and states after only some minimal relief it has now returned over the past month and is significantly worse. It is present on bilateral upper extremities back and neck. The patient has no other acute complaints at this time. REVIEW OF SYSTEMS   Review of Systems   Constitutional: Negative for chills and fever. HENT: Negative for rhinorrhea, sinus pain and sore throat. Eyes: Negative for redness. Respiratory: Negative for cough and shortness of breath. Cardiovascular: Negative for chest pain. Gastrointestinal: Positive for blood in stool. Negative for abdominal pain, diarrhea, nausea and vomiting. Genitourinary: Negative for dysuria. Musculoskeletal: Negative for back pain. Skin: Positive for rash. Neurological: Negative for dizziness, light-headedness and headaches. Psychiatric/Behavioral: Negative for agitation. PAST MEDICAL AND SURGICAL HISTORY     Past Medical History:   Diagnosis Date    Arthritis     Substance induced mood disorder ([facility] [address]      Past Surgical History:   Procedure Laterality Date    LEG SURGERY           MEDICATIONS   No current facility-administered medications for this encounter. Current Outpatient Medications:     permethrin (ELIMITE) 5 % cream, Apply topically as directed, Disp: 1 Tube, Rfl: 0    ivermectin 3 MG tablet, Take 5 tablets by mouth once for 1 dose, Disp: 4 tablet, Rfl: 0    diphenhydrAMINE (BENADRYL ALLERGY) 25 MG capsule, Take 1 capsule by mouth every 6 hours as needed for Itching, Disp: 30 capsule, Rfl: 0    tiZANidine (ZANAFLEX) 4 MG capsule, Take 1 capsule by mouth 3 times daily as needed for Muscle spasms, Disp: 24 capsule, Rfl: 0      SOCIAL HISTORY     Social History     Social History Narrative    Not on file     Social History     Tobacco Use    Smoking status: Current Every Day Smoker     Packs/day: 1.50     Years: 47.00     Pack years: 70.50    Smokeless tobacco: Never Used   Substance Use Topics    Alcohol use: Yes     Comment: 12 cans beer/day     Drug use: No     Frequency: 1.0 times per week     Types: Cocaine         ALLERGIES   No Known Allergies      FAMILY HISTORY     Family History   Problem Relation Age of Onset    Heart Attack Mother     Cancer Father          PREVIOUS RECORDS   Previous records reviewed: Patient was seen here 5/11/2020 for a bug bite. Collette Ruts PHYSICAL EXAM     ED Triage Vitals [09/10/20 1425]   BP Temp Temp Source Pulse Resp SpO2 Height Weight   (!) 184/90 98.1 °F (36.7 °C) Oral 93 18 98 % 6' (1.829 m) 160 lb (72.6 kg)     Initial vital signs and nursing assessment reviewed and normal. Pulsoximetry is normal per my interpretation. Additional Vital Signs:  Vitals:    09/10/20 1425   BP: (!) 184/90   Pulse: 93   Resp: 18   Temp: 98.1 °F (36.7 °C)   SpO2: 98%       Physical Exam  Vitals signs and nursing note reviewed. Constitutional:       Appearance: Normal appearance. He is normal weight. He is not ill-appearing or toxic-appearing. HENT:      Head: Normocephalic and atraumatic. Right Ear: External ear normal.      Left Ear: External ear normal.      Nose: Nose normal. No congestion. Mouth/Throat:      Mouth: Mucous membranes are moist.      Pharynx: Oropharynx is clear. Eyes:      Conjunctiva/sclera: Conjunctivae normal.   Neck:      Musculoskeletal: Normal range of motion and neck supple. No neck rigidity or muscular tenderness. Cardiovascular:      Rate and Rhythm: Normal rate and regular rhythm. Pulses: Normal pulses. Heart sounds: Normal heart sounds. No murmur. Pulmonary:      Effort: Pulmonary effort is normal. No respiratory distress. Breath sounds: Normal breath sounds. No stridor. No wheezing, rhonchi or rales. Chest:      Chest wall: No tenderness. Abdominal:      General: Abdomen is flat. There is no distension. Palpations: Abdomen is soft. Tenderness: There is no abdominal tenderness. There is no guarding or rebound. Musculoskeletal: Normal range of motion. Skin:     General: Skin is warm. Capillary Refill: Capillary refill takes less than 2 seconds. Findings: Rash present. Comments: Diffuse satellite lesions with excoriations noted throughout bilateral upper extremities including the interdigit spaces of his fingers. There are a few lesions that are excoriated and scratched on the back of patient's neck going to the base of his hairline as well on his upper back. The left elbow is more diffusely excoriated and is nearly scarred in nature for diffuse mechanical rubbing   Neurological:      General: No focal deficit present. Mental Status: He is alert and oriented to person, place, and time. Psychiatric:         Mood and Affect: Mood normal.         MEDICAL DECISION MAKING   Initial Assessment:  This is a 62-year-old male with a history of alcoholism who presents for one episode of GI bleed this morning as well as continued issues with scabies. He was seen on 5/11/2020 diagnosis scabies and started on permethrin cream as well as triamcinolone and Benadryl however states there is only some minimal relief with those medications at that time and has now progressed and is present on bilateral upper extremities upper chest as well as upper back and neck. He denies any symptomatology from his GI bleed and states he has had incidents of this over the past year however has not told anyone. Patient states his last drink was 1400 and that he does not have a history of alcohol withdrawal seizures. Differential Diagnosis Included but not limited to: Scabies, diverticular bleed, internal hemorrhoid, fistula, peptic ulcer    Plan: We will obtain lab work studies and will also perform a rectal exam to assess for possible sources from his bleeding. Given home prescription for permethrin cream.  Ivermectine was unable to be given here due to not being in our pharmacy he will be given a prescription for this as well. He has been given a primary care physician as well as gastroenterology referral follow-up with.       ED RESULTS   Laboratory results:  Labs Reviewed   COMPREHENSIVE METABOLIC PANEL - Abnormal; Notable for the following components:       Result Value    BUN <2 (*)      (*)     Total Protein 8.4 (*)     ALT 80 (*)     All other components within normal limits   CBC WITH AUTO DIFFERENTIAL - Abnormal; Notable for the following components:    RBC 4.38 (*)     .0 (*)     MCH 36.3 (*)     RDW-SD 46.9 (*)     Platelets 898 (*)     All other components within normal limits   OSMOLALITY - Abnormal; Notable for the following components:    Osmolality Calc 266.1 (*)     All other components within normal limits   APTT   ANION GAP   GLOMERULAR FILTRATION RATE, ESTIMATED       Radiologic studies results:  No orders to display       ED Medications administered this visit: Medications - No data to display      ED COURSE      Strict return precautions and follow up instructions were discussed with the patient prior to discharge, with which the patient agrees. MEDICATION CHANGES     New Prescriptions    IVERMECTIN 3 MG TABLET    Take 5 tablets by mouth once for 1 dose         FINAL DISPOSITION     Final diagnoses:   Scabies   Internal hemorrhoids     Condition: condition: good  Dispo: Discharge to home      This transcription was electronically signed. Parts of this transcriptions may have been dictated by use of voice recognition software and electronically transcribed, and parts may have been transcribed with the assistance of an ED scribe. The transcription may contain errors not detected in proofreading. Please refer to my supervising physician's documentation if my documentation differs.     Electronically Signed: Nicho Rivera, 09/10/20, 4:13 PM          Nicho Rivera MD  Resident  09/10/20 0813

## 2020-09-10 NOTE — ED NOTES
Pt presents to ED with complaint of blood in stool that has been ongoing for approximately one year. Pt states no pain and states he does not know shade of color of blood in stool, just states \"red\". Pt also states itching from previous diagnosis of \"scabies\" per pt from one month ago in ED. Pt states difficulty sleeping due to itching. Pt states consumption of 12 cans beer/day, and states he has consumed 6 cans today. Pt states no desire to stop drinking or receive resources at this time. Pt vital signs stable, pt does not appear to be in acute distress at this time.        [facility]  09/10/20 6100

## 2021-05-20 ENCOUNTER — APPOINTMENT (OUTPATIENT)
Dept: CT IMAGING | Age: 61
End: 2021-05-20
Payer: COMMERCIAL

## 2021-05-20 ENCOUNTER — HOSPITAL ENCOUNTER (EMERGENCY)
Age: 61
Discharge: HOME | End: 2021-05-20
Payer: COMMERCIAL

## 2021-05-20 VITALS
OXYGEN SATURATION: 98 % | HEIGHT: 72 IN | TEMPERATURE: 98 F | HEART RATE: 93 BPM | WEIGHT: 165 LBS | RESPIRATION RATE: 14 BRPM | BODY MASS INDEX: 22.35 KG/M2 | SYSTOLIC BLOOD PRESSURE: 161 MMHG | DIASTOLIC BLOOD PRESSURE: 99 MMHG

## 2021-05-20 DIAGNOSIS — S09.90XA INJURY OF HEAD, INITIAL ENCOUNTER: Primary | ICD-10-CM
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
## 2021-05-20 DIAGNOSIS — S01.81XA LACERATION OF FOREHEAD, INITIAL ENCOUNTER: ICD-10-CM

## 2021-05-20 LAB
ANION GAP SERPL CALCULATED.3IONS-SCNC: 15 MEQ/L (ref 8–16)
BASOPHILS # BLD: 1.4 %
BASOPHILS ABSOLUTE: 0.1 THOU/MM3 (ref 0–0.1)
BUN BLDV-MCNC: 4 MG/DL (ref 7–22)
CALCIUM SERPL-MCNC: 9.1 MG/DL (ref 8.5–10.5)
CHLORIDE BLD-SCNC: 99 MEQ/L (ref 98–111)
CO2: 22 MEQ/L (ref 23–33)
CREAT SERPL-MCNC: 0.6 MG/DL (ref 0.4–1.2)
EOSINOPHIL # BLD: 3.8 %
EOSINOPHILS ABSOLUTE: 0.2 THOU/MM3 (ref 0–0.4)
ERYTHROCYTE [DISTWIDTH] IN BLOOD BY AUTOMATED COUNT: 14.3 % (ref 11.5–14.5)
ERYTHROCYTE [DISTWIDTH] IN BLOOD BY AUTOMATED COUNT: 51.5 FL (ref 35–45)
ETHYL ALCOHOL, SERUM: 0.26 %
GFR SERPL CREATININE-BSD FRML MDRD: > 90 ML/MIN/1.73M2
GLUCOSE BLD-MCNC: 81 MG/DL (ref 70–108)
HCT VFR BLD CALC: 45.9 % (ref 42–52)
HEMOGLOBIN: 16 GM/DL (ref 14–18)
IMMATURE GRANS (ABS): 0.03 THOU/MM3 (ref 0–0.07)
IMMATURE GRANULOCYTES: 0.5 %
LYMPHOCYTES # BLD: 35.2 %
LYMPHOCYTES ABSOLUTE: 2.1 THOU/MM3 (ref 1–4.8)
MCH RBC QN AUTO: 34.1 PG (ref 26–33)
MCHC RBC AUTO-ENTMCNC: 34.9 GM/DL (ref 32.2–35.5)
MCV RBC AUTO: 97.9 FL (ref 80–94)
MONOCYTES # BLD: 13.3 %
MONOCYTES ABSOLUTE: 0.8 THOU/MM3 (ref 0.4–1.3)
NUCLEATED RED BLOOD CELLS: 0 /100 WBC
OSMOLALITY CALCULATION: 267.9 MOSMOL/KG (ref 275–300)
PLATELET # BLD: 149 THOU/MM3 (ref 130–400)
PMV BLD AUTO: 9.5 FL (ref 9.4–12.4)
POTASSIUM REFLEX MAGNESIUM: 4 MEQ/L (ref 3.5–5.2)
RBC # BLD: 4.69 MILL/MM3 (ref 4.7–6.1)
SEG NEUTROPHILS: 45.8 %
SEGMENTED NEUTROPHILS ABSOLUTE COUNT: 2.7 THOU/MM3 (ref 1.8–7.7)
SODIUM BLD-SCNC: 136 MEQ/L (ref 135–145)
WBC # BLD: 5.9 THOU/MM3 (ref 4.8–10.8)

## 2021-05-20 PROCEDURE — 70450 CT HEAD/BRAIN W/O DYE: CPT

## 2021-05-20 PROCEDURE — 6360000002 HC RX W HCPCS: Performed by: NURSE PRACTITIONER

## 2021-05-20 PROCEDURE — 82077 ASSAY SPEC XCP UR&BREATH IA: CPT

## 2021-05-20 PROCEDURE — 90471 IMMUNIZATION ADMIN: CPT | Performed by: NURSE PRACTITIONER

## 2021-05-20 PROCEDURE — 36415 COLL VENOUS BLD VENIPUNCTURE: CPT

## 2021-05-20 PROCEDURE — 12013 RPR F/E/E/N/L/M 2.6-5.0 CM: CPT

## 2021-05-20 PROCEDURE — 90715 TDAP VACCINE 7 YRS/> IM: CPT | Performed by: NURSE PRACTITIONER

## 2021-05-20 PROCEDURE — 72125 CT NECK SPINE W/O DYE: CPT

## 2021-05-20 PROCEDURE — 80048 BASIC METABOLIC PNL TOTAL CA: CPT

## 2021-05-20 PROCEDURE — 99282 EMERGENCY DEPT VISIT SF MDM: CPT

## 2021-05-20 PROCEDURE — 85025 COMPLETE CBC W/AUTO DIFF WBC: CPT

## 2021-05-20 RX ORDER — LIDOCAINE HYDROCHLORIDE 10 MG/ML
INJECTION, SOLUTION INFILTRATION; PERINEURAL
Status: DISCONTINUED
Start: 2021-05-20 | End: 2021-05-20 | Stop reason: HOSPADM

## 2021-05-20 RX ORDER — 0.9 % SODIUM CHLORIDE 0.9 %
1000 INTRAVENOUS SOLUTION INTRAVENOUS ONCE
Status: DISCONTINUED | OUTPATIENT
Start: 2021-05-20 | End: 2021-05-20

## 2021-05-20 RX ADMIN — TETANUS TOXOID, REDUCED DIPHTHERIA TOXOID AND ACELLULAR PERTUSSIS VACCINE, ADSORBED 0.5 ML: 5; 2.5; 8; 8; 2.5 SUSPENSION INTRAMUSCULAR at 14:57

## 2021-05-20 ASSESSMENT — ENCOUNTER SYMPTOMS
VOMITING: 0
NAUSEA: 0
SHORTNESS OF BREATH: 0
BACK PAIN: 1

## 2021-05-20 ASSESSMENT — PAIN DESCRIPTION - DESCRIPTORS: DESCRIPTORS: ACHING

## 2021-05-20 ASSESSMENT — PAIN SCALES - GENERAL: PAINLEVEL_OUTOF10: 8

## 2021-05-20 ASSESSMENT — PAIN DESCRIPTION - LOCATION: LOCATION: GENERALIZED

## 2021-05-20 NOTE — ED PROVIDER NOTES
[facility] COMPLAINT       Chief Complaint   Patient presents with    Head Laceration     left forehead    Fall     down 15 steps       Nurses Notes reviewed and I agree except as noted in the HPI. HISTORY OF PRESENT ILLNESS    Tejinder Yang is a 64 y.o. male who presents to the Emergency Department for the evaluation of a left forehead patient was he sustained just PTA. Pt admits to drinking 4 tall boy beers today. States he lost his footing and fell down 10-15 stairs inside at home. He notes that ladder fell onto him and struck him in the head. No LOC. Pt was able to ambulate without difficulty following the injury. He c/o middle-lower back pain and a mild HA as well. Pt admits to daily EtOH consumption. He is not on anticoagulants. Denies neck pain, vomiting, dizziness, acute vision changes, and any other complaints at this time. The HPI was provided by the patient. REVIEW OF SYSTEMS     Review of Systems   Constitutional: Negative for chills and fever. Eyes: Negative for visual disturbance. Respiratory: Negative for shortness of breath. Cardiovascular: Negative for chest pain. Gastrointestinal: Negative for nausea and vomiting. Musculoskeletal: Positive for back pain. Negative for neck pain and neck stiffness. Skin: Positive for wound. Neurological: Positive for headaches. Negative for dizziness. PAST MEDICAL HISTORY    has a past medical history of Arthritis and Substance induced mood disorder ([address] SURGICAL HISTORY      has a past surgical history that includes Leg Surgery.     CURRENT MEDICATIONS       Discharge Medication List as of 5/20/2021  3:30 PM      CONTINUE these medications which have NOT CHANGED    Details   permethrin (ELIMITE) 5 % cream Apply topically as directed, Disp-1 Tube,R-0, Print      diphenhydrAMINE (BENADRYL ALLERGY) 25 MG capsule Take 1 capsule by mouth every 6 hours as needed for Itching, Disp-30 capsule, R-0Print      tiZANidine (ZANAFLEX) 4 MG capsule Take 1 capsule by mouth 3 times daily as needed for Muscle spasms, Disp-24 capsule, R-0Print             ALLERGIES     has No Known Allergies. FAMILY HISTORY     He indicated that his mother is . He indicated that his father is . family history includes Cancer in his father; Heart Attack in his mother. SOCIAL HISTORY      reports that he has been smoking. He has a 70.50 pack-year smoking history. He has never used smokeless tobacco. He reports current alcohol use. He reports that he does not use drugs. PHYSICAL EXAM     INITIAL VITALS:  height is 6' (1.829 m) and weight is 165 lb (74.8 kg). His oral temperature is 98 °F (36.7 °C). His blood pressure is 161/99 (abnormal) and his pulse is 93. His respiration is 14 and oxygen saturation is 98%. Physical Exam  Vitals and nursing note reviewed. Constitutional:       General: He is awake. He is not in acute distress. Appearance: Normal appearance. He is well-developed and normal weight. He is not ill-appearing, toxic-appearing or diaphoretic. HENT:      Head: Normocephalic. Comments: Approximately 3 cm laceration just superior to the left eyebrow. Nose: Nose normal.      Mouth/Throat:      Mouth: Mucous membranes are moist.      Pharynx: Oropharynx is clear. Eyes:      Extraocular Movements: Extraocular movements intact. Pupils: Pupils are equal, round, and reactive to light. Cardiovascular:      Rate and Rhythm: Normal rate and regular rhythm. No extrasystoles are present. Pulses: Normal pulses. Heart sounds: Normal heart sounds, S1 normal and S2 normal. Heart sounds not distant. No murmur heard. No friction rub. No gallop. Pulmonary:      Effort: Pulmonary effort is normal. No tachypnea, bradypnea, accessory muscle usage, prolonged expiration, respiratory distress or retractions.       Breath sounds: Normal breath sounds. No stridor. No wheezing, rhonchi or rales. Chest:      Chest wall: No tenderness. Abdominal:      General: Abdomen is flat. Bowel sounds are normal. There is no distension. Palpations: Abdomen is soft. There is no shifting dullness, hepatomegaly, splenomegaly or mass. Tenderness: There is no abdominal tenderness. Hernia: No hernia is present. Musculoskeletal:         General: No swelling, deformity or signs of injury. Normal range of motion. Cervical back: Normal range of motion and neck supple. No rigidity. No muscular tenderness. Right lower leg: No edema. Left lower leg: No edema. Comments: Diffuse tenderness to the thoracic and lumbar spine. No bony tenderness. Skin:     General: Skin is warm and dry. Capillary Refill: Capillary refill takes less than 2 seconds. Coloration: Skin is not jaundiced or pale. Neurological:      General: No focal deficit present. Mental Status: He is alert and oriented to person, place, and time. Mental status is at baseline. GCS: GCS eye subscore is 4. GCS verbal subscore is 5. GCS motor subscore is 6. Cranial Nerves: Cranial nerves are intact. Sensory: Sensation is intact. Psychiatric:         Mood and Affect: Mood normal.         Behavior: Behavior normal. Behavior is cooperative. DIFFERENTIAL DIAGNOSIS:   Fall, head laceration, concussion, lumbar strain, ICH, skull fracture, acute alcohol intoxication    DIAGNOSTIC RESULTS     EKG: All EKG's are interpreted by the Emergency Department Physician who either signs or Co-signs this chart in the absence of a cardiologist.    None    RADIOLOGY: non-plainfilm images(s) such as CT, Ultrasound and MRI are read by the radiologist.    CT Head WO Contrast   Final Result       1. Possible hematoma in the scalp overlying the left frontal calvarium. 2. Mild atrophy and dilatation of the third and lateral ventricles.    3. Probable ischemic changes in the white matter. 4. No evidence of intracranial hemorrhage. 5. Mild inflammatory changes in the ethmoid air cells bilaterally. .               **This report has been created using voice recognition software. It may contain minor errors which are inherent in voice recognition technology. **      Final report electronically signed by DR Fabiola Ivey on 5/20/2021 2:52 PM      CT Cervical Spine WO Contrast   Final Result   Multilevel degenerative changes with no acute fracture. **This report has been created using voice recognition software. It may contain minor errors which are inherent in voice recognition technology. **      Final report electronically signed by Dr Loren Schroeder on 5/20/2021 2:56 PM          LABS:     Labs Reviewed   CBC WITH AUTO DIFFERENTIAL - Abnormal; Notable for the following components:       Result Value    RBC 4.69 (*)     MCV 97.9 (*)     MCH 34.1 (*)     RDW-SD 51.5 (*)     All other components within normal limits   BASIC METABOLIC PANEL W/ REFLEX TO MG FOR LOW K - Abnormal; Notable for the following components:    CO2 22 (*)     BUN 4 (*)     All other components within normal limits   OSMOLALITY - Abnormal; Notable for the following components:    Osmolality Calc 267.9 (*)     All other components within normal limits   ETHANOL   ANION GAP   GLOMERULAR FILTRATION RATE, ESTIMATED       EMERGENCY DEPARTMENT COURSE:   Vitals:    Vitals:    05/20/21 1327   BP: (!) 161/99   Pulse: 93   Resp: 14   Temp: 98 °F (36.7 °C)   TempSrc: Oral   SpO2: 98%   Weight: 165 lb (74.8 kg)   Height: 6' (1.829 m)       1:23 PM EDT: The patient was seen and evaluated. MDM:  Patient evaluated for fall, head laceration, back pain. Denies loss of consciousness. He admits to drinking today, ethanol checked and found to be 0.26. CT imaging completed showing no acute intracranial hemorrhage or osseous abnormality. Forehead wound was closed, see procedure documentation for closure details. EMERGENCY DEPT  [address] [facility]  [phone]  Go in 1 week  For suture removal      DISCHARGE MEDICATIONS:  Discharge Medication List as of 5/20/2021  3:30 PM          (Please note that portions of this note were completed with a voice recognition program.  Efforts were made to edit the dictations but occasionally words are mis-transcribed.)    The patient was given an opportunity to see the Emergency Attending. The patient voiced understanding that I was a Mid-LevelProvider and was in agreement with being seen independently by myself. Provider:  I personally performed the services described in the documentation, reviewed and edited the documentation which was dictated to the scribe in my presence, and it accurately records my words and actions.     SUSI Belcher CNP, 5/20/21, 12:08 PM       SUSI Belcher CNP  05/23/21 [id number]

## 2021-05-27 ENCOUNTER — HOSPITAL ENCOUNTER (EMERGENCY)
Age: 61
Discharge: HOME | End: 2021-05-27
Payer: COMMERCIAL

## 2021-05-27 VITALS
OXYGEN SATURATION: 99 % | HEART RATE: 92 BPM | RESPIRATION RATE: 16 BRPM | WEIGHT: 160 LBS | SYSTOLIC BLOOD PRESSURE: 148 MMHG | BODY MASS INDEX: 21.67 KG/M2 | HEIGHT: 72 IN | TEMPERATURE: 97.6 F | DIASTOLIC BLOOD PRESSURE: 83 MMHG

## 2021-05-27 DIAGNOSIS — Z48.02 VISIT FOR SUTURE REMOVAL: Primary | ICD-10-CM

## 2021-05-27 PROCEDURE — 99282 EMERGENCY DEPT VISIT SF MDM: CPT

## 2021-05-27 NOTE — ED PROVIDER NOTES
[facility] Emergency Department    CHIEF COMPLAINT     No chief complaint on file. Nurses Notes reviewed and I agree except as noted in the HPI. HISTORY OF PRESENT ILLNESS    Ankitdelia Castellanos [place] 64 y.o. male who presents to the ED for suture removal. Pt had sutures placed 7 days ago and was told to return in 7 days to have them removed. Pt has no further complaints at this time. HPI was provided by the patient. REVIEW OF SYSTEMS     Review of Systems   All other systems reviewed and are negative. PAST MEDICAL HISTORY     Past Medical History:   Diagnosis Date    Arthritis     Substance induced mood disorder ([facility] [address]        SURGICALHISTORY      has a past surgical history that includes Leg Surgery. CURRENT MEDICATIONS       Discharge Medication List as of 2021 10:35 AM      CONTINUE these medications which have NOT CHANGED    Details   permethrin (ELIMITE) 5 % cream Apply topically as directed, Disp-1 Tube,R-0, Print      diphenhydrAMINE (BENADRYL ALLERGY) 25 MG capsule Take 1 capsule by mouth every 6 hours as needed for Itching, Disp-30 capsule, R-0Print      tiZANidine (ZANAFLEX) 4 MG capsule Take 1 capsule by mouth 3 times daily as needed for Muscle spasms, Disp-24 capsule, R-0Print             ALLERGIES     has No Known Allergies. FAMILY HISTORY     He indicated that his mother is . He indicated that his father is . family history includes Cancer in his father; Heart Attack in his mother.     SOCIAL HISTORY       Social History     Socioeconomic History    Marital status: Single     Spouse name: Not on file    Number of children: 0    Years of education: 8    Highest education level: Not on file   Occupational History    Not on file   Tobacco Use    Smoking status: Current Every Day Smoker     Packs/day: 1.50     Years: 47.00     Pack years: 70.50    Smokeless tobacco: Never Used   Substance and Sexual Activity    Alcohol use: Yes     Comment: 12 cans beer/day     Drug use: No     Frequency: 1.0 times per week     Types: Cocaine    Sexual activity: Not Currently   Other Topics Concern    Not on file   Social History Narrative    Not on file     Social Determinants of Health     Financial Resource Strain:     Difficulty of Paying Living Expenses:    Food Insecurity:     Worried About Running Out of Food in the Last Year:     Ran Out of Food in the Last Year:    Transportation Needs:     Lack of Transportation (Medical):  Lack of Transportation (Non-Medical):    Physical Activity:     Days of Exercise per Week:     Minutes of Exercise per Session:    Stress:     Feeling of Stress :    Social Connections:     Frequency of Communication with Friends and Family:     Frequency of Social Gatherings with Friends and Family:     Attends Religion Services:     Active Member of Clubs or Organizations:     Attends Club or Organization Meetings:     Marital Status:    Intimate Partner Violence:     Fear of Current or Ex-Partner:     Emotionally Abused:     Physically Abused:     Sexually Abused:        PHYSICAL EXAM     INITIAL VITALS:  height is 6' (1.829 m) and weight is 160 lb (72.6 kg). His oral temperature is 97.6 °F (36.4 °C). His blood pressure is 148/83 (abnormal) and his pulse is 92. His respiration is 16 and oxygen saturation is 99%. Physical Exam  Vitals and nursing note reviewed. HENT:      Head: Normocephalic. Comments: 8 sutures were removed from left forehead; no erythema, edema, or wound discharge noted at site; laceration appears to be healing well with scab formation         DIFFERENTIAL DIAGNOSIS:   Suture removal    DIAGNOSTIC RESULTS       RADIOLOGY: non-plainfilm images(s) such as CT, Ultrasound and MRI are read by the radiologist.  Plain radiographic images are visualized and preliminarily interpreted by the emergency physician unless otherwise stated below.   No orders to display         LABS:   Labs Reviewed - No data to display    EMERGENCY DEPARTMENT COURSE:   Vitals:    Vitals:    05/27/21 1009   BP: (!) 148/83   Pulse: 92   Resp: 16   Temp: 97.6 °F (36.4 °C)   TempSrc: Oral   SpO2: 99%   Weight: 160 lb (72.6 kg)   Height: 6' (1.829 m)       MDM    Patient was seen and evaluated in the emergency department, patient appeared to be in no acute distress, vital signs were reviewed, no significant findings noted. Physical exam was completed, he has healing laceration over the left eye. Sutures removed with no difficulty. Discussed discharge with the patient is amenable, advised to return with signs of infection. He verbalized understanding plan of care. Medications - No data to display    Patient was seenindependently by myself. The patient's final impression and disposition and plan was determined by myself. CRITICAL CARE:   None    CONSULTS:  None    PROCEDURES:  None    FINAL IMPRESSION     1. Visit for suture removal          DISPOSITION/PLAN   Patient discharged in stable condition  PATIENT REFERREDTO:  [facility] [address] [address] EMERGENCY DEPT  [address]  Go to   If symptoms worsen      DISCHARGE MEDICATIONS:  Discharge Medication List as of 5/27/2021 10:35 AM          (Please note that portions of this note were completed with a voice recognition program.  Efforts were made to edit the dictations but occasionally words are mis-transcribed.)      Provider:  I personally performed the services described in the documentation,reviewed and edited the documentation which was dictated to the scribe in my presence, and it accurately records my words and actions.     Nj Rojo CNP 05/27/21 11:15 AM    SUSI Lee - GIOVANNA        Stretchr, APRN - CNP  05/27/21 [id number]

## 2021-07-06 ENCOUNTER — HOSPITAL ENCOUNTER (EMERGENCY)
Age: 61
Discharge: HOME | End: 2021-07-06
Attending: INTERNAL MEDICINE
Payer: COMMERCIAL

## 2021-07-06 ENCOUNTER — APPOINTMENT (OUTPATIENT)
Dept: GENERAL RADIOLOGY | Age: 61
End: 2021-07-06
Payer: COMMERCIAL

## 2021-07-06 ENCOUNTER — APPOINTMENT (OUTPATIENT)
Dept: CT IMAGING | Age: 61
End: 2021-07-06
Payer: COMMERCIAL

## 2021-07-06 VITALS
WEIGHT: 150 LBS | HEART RATE: 97 BPM | OXYGEN SATURATION: 96 % | TEMPERATURE: 98.6 F | SYSTOLIC BLOOD PRESSURE: 145 MMHG | RESPIRATION RATE: 16 BRPM | HEIGHT: 72 IN | BODY MASS INDEX: 20.32 KG/M2 | DIASTOLIC BLOOD PRESSURE: 81 MMHG

## 2021-07-06 DIAGNOSIS — K29.00 OTHER ACUTE GASTRITIS WITHOUT HEMORRHAGE: Primary | ICD-10-CM

## 2021-07-06 LAB
ALBUMIN SERPL-MCNC: 3.6 G/DL (ref 3.5–5.1)
ALP BLD-CCNC: 72 U/L (ref 38–126)
ALT SERPL-CCNC: 31 U/L (ref 11–66)
ANION GAP SERPL CALCULATED.3IONS-SCNC: 14 MEQ/L (ref 8–16)
AST SERPL-CCNC: 64 U/L (ref 5–40)
BASOPHILS # BLD: 0.8 %
BASOPHILS ABSOLUTE: 0.1 THOU/MM3 (ref 0–0.1)
BILIRUB SERPL-MCNC: 1.1 MG/DL (ref 0.3–1.2)
BUN BLDV-MCNC: 3 MG/DL (ref 7–22)
CALCIUM SERPL-MCNC: 9 MG/DL (ref 8.5–10.5)
CHLORIDE BLD-SCNC: 93 MEQ/L (ref 98–111)
CO2: 22 MEQ/L (ref 23–33)
CREAT SERPL-MCNC: 0.4 MG/DL (ref 0.4–1.2)
D-DIMER QUANTITATIVE: 2475 NG/ML FEU (ref 0–500)
EKG ATRIAL RATE: 116 BPM
EKG P AXIS: 83 DEGREES
EKG P-R INTERVAL: 162 MS
EKG Q-T INTERVAL: 318 MS
EKG QRS DURATION: 92 MS
EKG QTC CALCULATION (BAZETT): 442 MS
EKG R AXIS: 84 DEGREES
EKG T AXIS: 77 DEGREES
EKG VENTRICULAR RATE: 116 BPM
EOSINOPHIL # BLD: 0.8 %
EOSINOPHILS ABSOLUTE: 0.1 THOU/MM3 (ref 0–0.4)
ERYTHROCYTE [DISTWIDTH] IN BLOOD BY AUTOMATED COUNT: 13 % (ref 11.5–14.5)
ERYTHROCYTE [DISTWIDTH] IN BLOOD BY AUTOMATED COUNT: 46.5 FL (ref 35–45)
GFR SERPL CREATININE-BSD FRML MDRD: > 90 ML/MIN/1.73M2
GLUCOSE BLD-MCNC: 100 MG/DL (ref 70–108)
HCT VFR BLD CALC: 43.2 % (ref 42–52)
HEMOGLOBIN: 15.2 GM/DL (ref 14–18)
IMMATURE GRANS (ABS): 0.03 THOU/MM3 (ref 0–0.07)
IMMATURE GRANULOCYTES: 0.4 %
LYMPHOCYTES # BLD: 12.7 %
LYMPHOCYTES ABSOLUTE: 1 THOU/MM3 (ref 1–4.8)
MCH RBC QN AUTO: 34.2 PG (ref 26–33)
MCHC RBC AUTO-ENTMCNC: 35.2 GM/DL (ref 32.2–35.5)
MCV RBC AUTO: 97.3 FL (ref 80–94)
MONOCYTES # BLD: 16 %
MONOCYTES ABSOLUTE: 1.2 THOU/MM3 (ref 0.4–1.3)
NUCLEATED RED BLOOD CELLS: 0 /100 WBC
OSMOLALITY CALCULATION: 255.6 MOSMOL/KG (ref 275–300)
PLATELET # BLD: 110 THOU/MM3 (ref 130–400)
PMV BLD AUTO: 10.1 FL (ref 9.4–12.4)
POTASSIUM REFLEX MAGNESIUM: 3.7 MEQ/L (ref 3.5–5.2)
RBC # BLD: 4.44 MILL/MM3 (ref 4.7–6.1)
REASON FOR REJECTION: NORMAL
REJECTED TEST: NORMAL
SEG NEUTROPHILS: 69.3 %
SEGMENTED NEUTROPHILS ABSOLUTE COUNT: 5.2 THOU/MM3 (ref 1.8–7.7)
SODIUM BLD-SCNC: 129 MEQ/L (ref 135–145)
TOTAL PROTEIN: 7.6 G/DL (ref 6.1–8)
TROPONIN T: < 0.01 NG/ML
WBC # BLD: 7.5 THOU/MM3 (ref 4.8–10.8)

## 2021-07-06 PROCEDURE — 6360000004 HC RX CONTRAST MEDICATION: Performed by: INTERNAL MEDICINE

## 2021-07-06 PROCEDURE — 93005 ELECTROCARDIOGRAM TRACING: CPT | Performed by: INTERNAL MEDICINE

## 2021-07-06 PROCEDURE — 85025 COMPLETE CBC W/AUTO DIFF WBC: CPT

## 2021-07-06 PROCEDURE — 84484 ASSAY OF TROPONIN QUANT: CPT

## 2021-07-06 PROCEDURE — 71045 X-RAY EXAM CHEST 1 VIEW: CPT

## 2021-07-06 PROCEDURE — 6360000002 HC RX W HCPCS: Performed by: INTERNAL MEDICINE

## 2021-07-06 PROCEDURE — 80053 COMPREHEN METABOLIC PANEL: CPT

## 2021-07-06 PROCEDURE — 6370000000 HC RX 637 (ALT 250 FOR IP): Performed by: INTERNAL MEDICINE

## 2021-07-06 PROCEDURE — 93010 ELECTROCARDIOGRAM REPORT: CPT | Performed by: INTERNAL MEDICINE

## 2021-07-06 PROCEDURE — 99285 EMERGENCY DEPT VISIT HI MDM: CPT

## 2021-07-06 PROCEDURE — 71275 CT ANGIOGRAPHY CHEST: CPT

## 2021-07-06 PROCEDURE — 85379 FIBRIN DEGRADATION QUANT: CPT

## 2021-07-06 PROCEDURE — 36415 COLL VENOUS BLD VENIPUNCTURE: CPT

## 2021-07-06 PROCEDURE — 96374 THER/PROPH/DIAG INJ IV PUSH: CPT

## 2021-07-06 RX ORDER — FAMOTIDINE 20 MG/1
20 TABLET, FILM COATED ORAL 2 TIMES DAILY
Qty: 60 TABLET | Refills: 0 | Status: SHIPPED | OUTPATIENT
Start: 2021-07-06 | End: 2022-01-10

## 2021-07-06 RX ORDER — KETOROLAC TROMETHAMINE 30 MG/ML
30 INJECTION, SOLUTION INTRAMUSCULAR; INTRAVENOUS ONCE
Status: COMPLETED | OUTPATIENT
Start: 2021-07-06 | End: 2021-07-06

## 2021-07-06 RX ORDER — KETOROLAC TROMETHAMINE 30 MG/ML
30 INJECTION, SOLUTION INTRAMUSCULAR; INTRAVENOUS ONCE
Status: DISCONTINUED | OUTPATIENT
Start: 2021-07-06 | End: 2021-07-06

## 2021-07-06 RX ORDER — PANTOPRAZOLE SODIUM 20 MG/1
40 TABLET, DELAYED RELEASE ORAL DAILY
Qty: 60 TABLET | Refills: 0 | Status: SHIPPED | OUTPATIENT
Start: 2021-07-06 | End: 2022-01-10

## 2021-07-06 RX ORDER — FAMOTIDINE 20 MG/1
20 TABLET, FILM COATED ORAL ONCE
Status: COMPLETED | OUTPATIENT
Start: 2021-07-06 | End: 2021-07-06

## 2021-07-06 RX ORDER — PANTOPRAZOLE SODIUM 40 MG/1
40 TABLET, DELAYED RELEASE ORAL ONCE
Status: COMPLETED | OUTPATIENT
Start: 2021-07-06 | End: 2021-07-06

## 2021-07-06 RX ORDER — SUCRALFATE 1 G/1
1 TABLET ORAL EVERY 6 HOURS SCHEDULED
Status: DISCONTINUED | OUTPATIENT
Start: 2021-07-06 | End: 2021-07-06 | Stop reason: HOSPADM

## 2021-07-06 RX ADMIN — SUCRALFATE 1 G: 1 TABLET ORAL at 18:46

## 2021-07-06 RX ADMIN — FAMOTIDINE 20 MG: 20 TABLET, FILM COATED ORAL at 18:45

## 2021-07-06 RX ADMIN — KETOROLAC TROMETHAMINE 30 MG: 30 INJECTION, SOLUTION INTRAMUSCULAR at 16:05

## 2021-07-06 RX ADMIN — IOPAMIDOL 85 ML: 755 INJECTION, SOLUTION INTRAVENOUS at 17:55

## 2021-07-06 RX ADMIN — PANTOPRAZOLE SODIUM 40 MG: 40 TABLET, DELAYED RELEASE ORAL at 18:46

## 2021-07-06 ASSESSMENT — PAIN SCALES - GENERAL
PAINLEVEL_OUTOF10: 9
PAINLEVEL_OUTOF10: 5
PAINLEVEL_OUTOF10: 9
PAINLEVEL_OUTOF10: 5

## 2021-07-06 ASSESSMENT — PAIN DESCRIPTION - PAIN TYPE: TYPE: ACUTE PAIN

## 2021-07-06 ASSESSMENT — PAIN DESCRIPTION - LOCATION: LOCATION: CHEST

## 2021-07-06 NOTE — ED TRIAGE NOTES
Pt present to the ED via EMS with c/o chest pain x2 days. Pt rates pain at a 9/10 and states that it is sharp and stabbing.  VSS, respirations even and unlabored

## 2021-07-06 NOTE — ED PROVIDER NOTES
Social Determinants of Health     Financial Resource Strain:     Difficulty of Paying Living Expenses:    Food Insecurity:     Worried About Running Out of Food in the Last Year:     [address] in the Last Year:    Transportation Needs:     Lack of Transportation (Medical):  Lack of Transportation (Non-Medical):    Physical Activity:     Days of Exercise per Week:     Minutes of Exercise per Session:    Stress:     Feeling of Stress :    Social Connections:     Frequency of Communication with Friends and Family:     Frequency of Social Gatherings with Friends and Family:     Attends [employer] Services:     Active Member of Clubs or Organizations:     Attends Club or Organization Meetings:     Marital Status:    Intimate Partner Violence:     Fear of Current or Ex-Partner:     Emotionally Abused:     Physically Abused:     Sexually Abused:        REVIEW OF SYSTEMS    Constitutional:  Denies fever, chills, weight loss or weakness   Eyes:  Denies photophobia or discharge   HENT:  Denies sore throat or ear pain   Respiratory:  Denies cough or shortness of breath   Cardiovascular:  Denies chest pain, palpitations or swelling   GI:  Denies abdominal pain, nausea, vomiting, or diarrhea   Musculoskeletal:  Denies back pain   Skin:  Denies rash   Neurologic:  Denies headache, focal weakness or sensory changes   Endocrine:  Denies polyuria or polydypsia   Lymphatic:  Denies swollen glands   Psychiatric:  Denies depression, suicidal ideation or homicidal ideation   All systems negative except as marked. PHYSICAL EXAM    VITAL SIGNS: /81   Pulse 101   Temp 98.6 °F (37 °C) (Oral)   Resp 18   Ht 6' (1.829 m)   Wt 150 lb (68 kg)   SpO2 96%   BMI 20.34 kg/m²    Constitutional:  Well developed, Well nourished, No acute distress, Non-toxic appearance.    HENT:  Normocephalic, Atraumatic, Bilateral external ears normal, Oropharynx moist, No oral exudates, Nose normal. Neck- Normal range of motion, No tenderness, Supple, No stridor. Eyes:  PERRL, EOMI, Conjunctiva normal, No discharge. Respiratory:  Normal breath sounds, No respiratory distress, No wheezing, No chest tenderness. Cardiovascular:  Normal heart rate, Normal rhythm, No murmurs, No rubs, No gallops. Reproducible chest pain in the left second intercostal area  GI:  Bowel sounds normal, Soft, No tenderness, No masses, No pulsatile masses. : External genitalia appear normal, No masses or lesions. No discharge. No CVA tenderness. Musculoskeletal:  Intact distal pulses, No edema, No tenderness, No cyanosis, No clubbing. Good range of motion in all major joints. No tenderness to palpation or major deformities noted. Back- No tenderness. Integument:  Warm, Dry, No erythema, No rash. Lymphatic:  No lymphadenopathy noted. Neurologic:  Alert & oriented x 3, Normal motor function, Normal sensory function, No focal deficits noted. Psychiatric:  Affect normal, Judgment normal, Mood normal.     EKG    Sinus tachycardia with heart rate of 116    RADIOLOGY        PROCEDURES        ED COURSE & MEDICAL DECISION MAKING    Pertinent Labs & Imaging studies reviewed. (See chart for details)  Patient was initially given Toradol to control his pain which partially controlled his pain since patient's pain seemed to be chest wall pain. Patient CTA was done because of elevated D-dimer, it showed diffuse edema surrounding the distal esophagus and stomach fundus, stomach mucosa is poorly visualized this is unclear etiology but could represent injury to the stomach mucosa/ruptured gastric diverticulum direct visualization may be helpful. I shared this information with Dr. Arden Sal, because patient is afebrile with no leukocytosis he was comfortable discharging the patient home on proton pump inhibitor and to have him follow-up at the [facility] office tomorrow morning. FINAL IMPRESSION    1.  Other acute gastritis without hemorrhage            Ki Jackie Greco MD  07/06/21 7626

## 2021-08-12 ENCOUNTER — HOSPITAL ENCOUNTER (EMERGENCY)
Age: 61
Discharge: HOME | End: 2021-08-12
Attending: EMERGENCY MEDICINE
Payer: COMMERCIAL

## 2021-08-12 ENCOUNTER — APPOINTMENT (OUTPATIENT)
Dept: GENERAL RADIOLOGY | Age: 61
End: 2021-08-12
Payer: COMMERCIAL

## 2021-08-12 VITALS
RESPIRATION RATE: 14 BRPM | HEART RATE: 108 BPM | DIASTOLIC BLOOD PRESSURE: 71 MMHG | OXYGEN SATURATION: 97 % | SYSTOLIC BLOOD PRESSURE: 145 MMHG

## 2021-08-12 DIAGNOSIS — M54.50 LOW BACK PAIN, UNSPECIFIED BACK PAIN LATERALITY, UNSPECIFIED CHRONICITY, UNSPECIFIED WHETHER SCIATICA PRESENT: Primary | ICD-10-CM

## 2021-08-12 LAB
ANION GAP SERPL CALCULATED.3IONS-SCNC: 11 MEQ/L (ref 8–16)
BASOPHILS # BLD: 1.3 %
BASOPHILS ABSOLUTE: 0.1 THOU/MM3 (ref 0–0.1)
BUN BLDV-MCNC: 6 MG/DL (ref 7–22)
C-REACTIVE PROTEIN: 14.47 MG/DL (ref 0–1)
CALCIUM SERPL-MCNC: 9.7 MG/DL (ref 8.5–10.5)
CHLORIDE BLD-SCNC: 97 MEQ/L (ref 98–111)
CO2: 26 MEQ/L (ref 23–33)
CREAT SERPL-MCNC: 0.5 MG/DL (ref 0.4–1.2)
EOSINOPHIL # BLD: 2.1 %
EOSINOPHILS ABSOLUTE: 0.1 THOU/MM3 (ref 0–0.4)
ERYTHROCYTE [DISTWIDTH] IN BLOOD BY AUTOMATED COUNT: 13 % (ref 11.5–14.5)
ERYTHROCYTE [DISTWIDTH] IN BLOOD BY AUTOMATED COUNT: 46.3 FL (ref 35–45)
GFR SERPL CREATININE-BSD FRML MDRD: > 90 ML/MIN/1.73M2
GLUCOSE BLD-MCNC: 107 MG/DL (ref 70–108)
HCT VFR BLD CALC: 44.2 % (ref 42–52)
HEMOGLOBIN: 15 GM/DL (ref 14–18)
IMMATURE GRANS (ABS): 0.04 THOU/MM3 (ref 0–0.07)
IMMATURE GRANULOCYTES: 0.6 %
LYMPHOCYTES # BLD: 18.2 %
LYMPHOCYTES ABSOLUTE: 1.1 THOU/MM3 (ref 1–4.8)
MCH RBC QN AUTO: 32.9 PG (ref 26–33)
MCHC RBC AUTO-ENTMCNC: 33.9 GM/DL (ref 32.2–35.5)
MCV RBC AUTO: 96.9 FL (ref 80–94)
MONOCYTES # BLD: 16 %
MONOCYTES ABSOLUTE: 1 THOU/MM3 (ref 0.4–1.3)
NUCLEATED RED BLOOD CELLS: 0 /100 WBC
OSMOLALITY CALCULATION: 266.3 MOSMOL/KG (ref 275–300)
PLATELET # BLD: 231 THOU/MM3 (ref 130–400)
PMV BLD AUTO: 9.9 FL (ref 9.4–12.4)
POTASSIUM REFLEX MAGNESIUM: 4.2 MEQ/L (ref 3.5–5.2)
RBC # BLD: 4.56 MILL/MM3 (ref 4.7–6.1)
SEG NEUTROPHILS: 61.8 %
SEGMENTED NEUTROPHILS ABSOLUTE COUNT: 3.9 THOU/MM3 (ref 1.8–7.7)
SODIUM BLD-SCNC: 134 MEQ/L (ref 135–145)
WBC # BLD: 6.3 THOU/MM3 (ref 4.8–10.8)

## 2021-08-12 PROCEDURE — 36415 COLL VENOUS BLD VENIPUNCTURE: CPT

## 2021-08-12 PROCEDURE — 86140 C-REACTIVE PROTEIN: CPT

## 2021-08-12 PROCEDURE — 71046 X-RAY EXAM CHEST 2 VIEWS: CPT

## 2021-08-12 PROCEDURE — 99281 EMR DPT VST MAYX REQ PHY/QHP: CPT

## 2021-08-12 PROCEDURE — 80048 BASIC METABOLIC PNL TOTAL CA: CPT

## 2021-08-12 PROCEDURE — 72072 X-RAY EXAM THORAC SPINE 3VWS: CPT

## 2021-08-12 PROCEDURE — 72100 X-RAY EXAM L-S SPINE 2/3 VWS: CPT

## 2021-08-12 PROCEDURE — 85025 COMPLETE CBC W/AUTO DIFF WBC: CPT

## 2021-08-12 ASSESSMENT — ENCOUNTER SYMPTOMS
CHEST TIGHTNESS: 0
GASTROINTESTINAL NEGATIVE: 1
COUGH: 1
CHOKING: 0
BACK PAIN: 1
PHOTOPHOBIA: 0
APNEA: 0

## 2021-08-12 NOTE — ED TRIAGE NOTES
Patient presents with back pain that travels down his legs. States he has had this for months. States years ago someone told him he had disc problems in his back.  States he sometimes gets lightheaded when the pain happens too

## 2021-08-12 NOTE — ED PROVIDER NOTES
[facility] ENCOUNTER          Pt Name: Josie Yi  MRN: [id number]  [place] 1960  Date of evaluation: 8/12/2021  Treating Resident Physician: Buster Gonzalez MD  Supervising Physician: Nicole Rodríguez       Chief Complaint   Patient presents with    Back Pain     sciatica x several months    Dizziness     History obtained from the patient. HISTORY OF PRESENT ILLNESS    HPI  Josie Yi is a 64 y.o. male who presents to the emergency department for evaluation of back pain. According to patient, he has had this back pain worsening over the past 3 months. Back pain 3 out of 10, worse with walking, associated with occasional bilateral numbness and tingling. Associated with some bilateral lower extremity weakness, worse on the left. Patient denies any bladder dysfunction, fever, current drug usage. Patient does have remote history of drug usage. The patient has no other acute complaints at this time. REVIEW OF SYSTEMS   Review of Systems   Constitutional: Positive for unexpected weight change. Negative for diaphoresis, fatigue and fever. Eyes: Negative for photophobia and visual disturbance. Respiratory: Positive for cough. Negative for apnea, choking and chest tightness. Gastrointestinal: Negative. Genitourinary: Negative for decreased urine volume, difficulty urinating, flank pain, frequency, hematuria, penile pain and urgency. Musculoskeletal: Positive for back pain and gait problem. PAST MEDICAL AND SURGICAL HISTORY     Past Medical History:   Diagnosis Date    Arthritis     Substance induced mood disorder ([address]      Past Surgical History:   Procedure Laterality Date    LEG SURGERY           MEDICATIONS   No current facility-administered medications for this encounter.     Current Outpatient Medications:     pantoprazole (PROTONIX) 20 MG tablet, Take 2 tablets by mouth daily for 30 doses, Disp: 60 tablet, Rfl: 0    famotidine (PEPCID) 20 MG tablet, Take 1 tablet by mouth 2 times daily, Disp: 60 tablet, Rfl: 0      SOCIAL HISTORY     Social History     Social History Narrative    Not on file     Social History     Tobacco Use    Smoking status: Current Every Day Smoker     Packs/day: 1.50     Years: 47.00     Pack years: 70.50    Smokeless tobacco: Never Used   Substance Use Topics    Alcohol use: Yes     Comment: 12 cans beer/day     Drug use: No     Frequency: 1.0 times per week     Types: Cocaine         ALLERGIES   No Known Allergies      FAMILY HISTORY     Family History   Problem Relation Age of Onset    Heart Attack Mother     Cancer Father          PREVIOUS RECORDS   Previous records reviewed: Medical, past surgical, medications, allergies        PHYSICAL EXAM     ED Triage Vitals [08/12/21 1113]   BP Temp Temp src Pulse Resp SpO2 Height Weight   (!) 145/71 -- -- 108 14 97 % -- --     Initial vital signs and nursing assessment reviewed and Hypertensive. There is no height or weight on file to calculate BMI. Pulsoximetry is normal per my interpretation. Additional Vital Signs:  Vitals:    08/12/21 1113   BP: (!) 145/71   Pulse: 108   Resp: 14   SpO2: 97%       Physical Exam  Constitutional:       Appearance: He is ill-appearing. HENT:      Head: Normocephalic and atraumatic. Right Ear: External ear normal.      Left Ear: External ear normal.      Nose: Nose normal.      Mouth/Throat:      Mouth: Mucous membranes are moist.      Pharynx: Oropharynx is clear. Eyes:      Conjunctiva/sclera: Conjunctivae normal.      Pupils: Pupils are equal, round, and reactive to light. Cardiovascular:      Rate and Rhythm: Normal rate and regular rhythm. Pulses: Normal pulses. Heart sounds: Normal heart sounds. Pulmonary:      Breath sounds: Wheezing, rhonchi and rales present. Abdominal:      General: Abdomen is flat. Palpations: Abdomen is soft. Final report electronically signed by Dr. Jerry Yang on 8/12/2021 1:02 PM      XR THORACIC SPINE (3 VIEWS)   Final Result   1. Very slight dextroscoliosis of the thoracic spine. Cannot exclude muscle spasm left side. 2. Minimal vertebral body spondylosis. No fracture. Disc spaces well-maintained. Paravertebral soft tissues are unremarkable. .            **This report has been created using voice recognition software. It may contain minor errors which are inherent in voice recognition technology. **      Final report electronically signed by Dr. Jerry Yang on 8/12/2021 1:01 PM      XR CHEST (2 VW)   Final Result   Emphysematous changes with no acute intrathoracic process. **This report has been created using voice recognition software. It may contain minor errors which are inherent in voice recognition technology. **      Final report electronically signed by Dr Misael Morgan on 8/12/2021 1:01 PM          ED Medications administered this visit: Medications - No data to display      ED COURSE         Strict return precautions and follow up instructions were discussed with the patient prior to discharge, with which the patient agrees. MEDICATION CHANGES     Discharge Medication List as of 8/12/2021  3:19 PM            FINAL DISPOSITION     Final diagnoses:   Low back pain, unspecified back pain laterality, unspecified chronicity, unspecified whether sciatica present     Condition: condition: Unknown  Dispo: Eloped      This transcription was electronically signed. Parts of this transcriptions may have been dictated by use of voice recognition software and electronically transcribed, and parts may have been transcribed with the assistance of an ED scribe. The transcription may contain errors not detected in proofreading. Please refer to my supervising physician's documentation if my documentation differs.     Electronically Signed: Malu Luther MD, 08/12/21, 11:36 PM

## 2022-01-10 ENCOUNTER — HOSPITAL ENCOUNTER (INPATIENT)
Age: 62
LOS: 14 days | Discharge: NURSING, REHAB OR LONG-TERM CARE | End: 2022-01-24
Attending: EMERGENCY MEDICINE
Payer: COMMERCIAL

## 2022-01-10 ENCOUNTER — APPOINTMENT (OUTPATIENT)
Dept: CT IMAGING | Age: 62
End: 2022-01-10
Payer: COMMERCIAL

## 2022-01-10 ENCOUNTER — APPOINTMENT (OUTPATIENT)
Dept: GENERAL RADIOLOGY | Age: 62
End: 2022-01-10
Payer: COMMERCIAL

## 2022-01-10 DIAGNOSIS — M79.672 FOOT PAIN, BILATERAL: ICD-10-CM

## 2022-01-10 DIAGNOSIS — L08.9 WOUND INFECTION: Primary | ICD-10-CM

## 2022-01-10 DIAGNOSIS — T33.822A FROSTBITE OF BOTH FEET, INITIAL ENCOUNTER: ICD-10-CM

## 2022-01-10 DIAGNOSIS — I73.9 PVD (PERIPHERAL VASCULAR DISEASE) (HCC): ICD-10-CM

## 2022-01-10 DIAGNOSIS — M79.671 FOOT PAIN, BILATERAL: ICD-10-CM

## 2022-01-10 DIAGNOSIS — T33.821A FROSTBITE OF BOTH FEET, INITIAL ENCOUNTER: ICD-10-CM

## 2022-01-10 DIAGNOSIS — T14.8XXA WOUND INFECTION: Primary | ICD-10-CM

## 2022-01-10 LAB
ALBUMIN SERPL-MCNC: 3.4 G/DL (ref 3.5–5.1)
ALP BLD-CCNC: 60 U/L (ref 38–126)
ALT SERPL-CCNC: 52 U/L (ref 11–66)
AMPHETAMINE+METHAMPHETAMINE URINE SCREEN: NEGATIVE
ANION GAP SERPL CALCULATED.3IONS-SCNC: 12 MEQ/L (ref 8–16)
AST SERPL-CCNC: 121 U/L (ref 5–40)
BACTERIA: ABNORMAL
BARBITURATE QUANTITATIVE URINE: NEGATIVE
BASOPHILS # BLD: 0.8 %
BASOPHILS ABSOLUTE: 0.1 THOU/MM3 (ref 0–0.1)
BENZODIAZEPINE QUANTITATIVE URINE: NEGATIVE
BILIRUB SERPL-MCNC: 1.5 MG/DL (ref 0.3–1.2)
BILIRUBIN DIRECT: 0.6 MG/DL (ref 0–0.3)
BILIRUBIN URINE: ABNORMAL
BLOOD, URINE: ABNORMAL
BUN BLDV-MCNC: 9 MG/DL (ref 7–22)
C-REACTIVE PROTEIN: 12.22 MG/DL (ref 0–1)
CALCIUM SERPL-MCNC: 9.3 MG/DL (ref 8.5–10.5)
CANNABINOID QUANTITATIVE URINE: NEGATIVE
CASTS: ABNORMAL /LPF
CASTS: ABNORMAL /LPF
CHARACTER, URINE: CLEAR
CHLORIDE BLD-SCNC: 88 MEQ/L (ref 98–111)
CO2: 27 MEQ/L (ref 23–33)
COCAINE METABOLITE QUANTITATIVE URINE: NEGATIVE
COLOR: ABNORMAL
CREAT SERPL-MCNC: 0.6 MG/DL (ref 0.4–1.2)
CREATININE URINE: 239.9 MG/DL
CRYSTALS: ABNORMAL
EOSINOPHIL # BLD: 0 %
EOSINOPHILS ABSOLUTE: 0 THOU/MM3 (ref 0–0.4)
EPITHELIAL CELLS, UA: ABNORMAL /HPF
ERYTHROCYTE [DISTWIDTH] IN BLOOD BY AUTOMATED COUNT: 13.4 % (ref 11.5–14.5)
ERYTHROCYTE [DISTWIDTH] IN BLOOD BY AUTOMATED COUNT: 49.6 FL (ref 35–45)
ETHYL ALCOHOL, SERUM: < 0.01 %
FOLATE: 9.8 NG/ML (ref 4.8–24.2)
GFR SERPL CREATININE-BSD FRML MDRD: > 90 ML/MIN/1.73M2
GLUCOSE BLD-MCNC: 130 MG/DL (ref 70–108)
GLUCOSE, URINE: ABNORMAL MG/DL
HCT VFR BLD CALC: 40 % (ref 42–52)
HEMOGLOBIN: 14.4 GM/DL (ref 14–18)
ICTOTEST: NEGATIVE
IMMATURE GRANS (ABS): 0.72 THOU/MM3 (ref 0–0.07)
IMMATURE GRANULOCYTES: 4.3 %
KETONES, URINE: ABNORMAL
LACTIC ACID, SEPSIS: 1.4 MMOL/L (ref 0.5–1.9)
LEUKOCYTE ESTERASE, URINE: ABNORMAL
LYMPHOCYTES # BLD: 4.9 %
LYMPHOCYTES ABSOLUTE: 0.8 THOU/MM3 (ref 1–4.8)
MAGNESIUM: 1.9 MG/DL (ref 1.6–2.4)
MCH RBC QN AUTO: 35.6 PG (ref 26–33)
MCHC RBC AUTO-ENTMCNC: 36 GM/DL (ref 32.2–35.5)
MCV RBC AUTO: 99 FL (ref 80–94)
MISCELLANEOUS LAB TEST RESULT: ABNORMAL
MONOCYTES # BLD: 8.3 %
MONOCYTES ABSOLUTE: 1.4 THOU/MM3 (ref 0.4–1.3)
MUCUS: ABNORMAL
NITRITE, URINE: ABNORMAL
NUCLEATED RED BLOOD CELLS: 0 /100 WBC
OPIATES, URINE: NEGATIVE
OSMOLALITY CALCULATION: 255.7 MOSMOL/KG (ref 275–300)
OSMOLALITY URINE: 641 MOSMOL/KG (ref 250–750)
OXYCODONE: NEGATIVE
PH UA: ABNORMAL (ref 5–9)
PHENCYCLIDINE QUANTITATIVE URINE: NEGATIVE
PLATELET # BLD: 238 THOU/MM3 (ref 130–400)
PLATELET ESTIMATE: ADEQUATE
PMV BLD AUTO: 10 FL (ref 9.4–12.4)
POTASSIUM REFLEX MAGNESIUM: 3 MEQ/L (ref 3.5–5.2)
PROCALCITONIN: 0.03 NG/ML (ref 0.01–0.09)
PROTEIN UA: ABNORMAL MG/DL
RBC # BLD: 4.04 MILL/MM3 (ref 4.7–6.1)
RBC URINE: ABNORMAL /HPF
RENAL EPITHELIAL, UA: ABNORMAL
SARS-COV-2, NAAT: NOT  DETECTED
SCAN OF BLOOD SMEAR: NORMAL
SEG NEUTROPHILS: 81.7 %
SEGMENTED NEUTROPHILS ABSOLUTE COUNT: 13.6 THOU/MM3 (ref 1.8–7.7)
SODIUM BLD-SCNC: 127 MEQ/L (ref 135–145)
SODIUM URINE: < 20 MEQ/L
SPECIFIC GRAVITY UA: ABNORMAL (ref 1–1.03)
TOTAL CK: 3452 U/L (ref 55–170)
TOTAL PROTEIN: 7.3 G/DL (ref 6.1–8)
TOXIC GRANULATION: PRESENT
UROBILINOGEN, URINE: ABNORMAL EU/DL (ref 0–1)
VITAMIN B-12: 576 PG/ML (ref 211–911)
WBC # BLD: 16.7 THOU/MM3 (ref 4.8–10.8)
WBC UA: ABNORMAL /HPF
YEAST: ABNORMAL

## 2022-01-10 PROCEDURE — 82077 ASSAY SPEC XCP UR&BREATH IA: CPT

## 2022-01-10 PROCEDURE — 2580000003 HC RX 258: Performed by: STUDENT IN AN ORGANIZED HEALTH CARE EDUCATION/TRAINING PROGRAM

## 2022-01-10 PROCEDURE — 84145 PROCALCITONIN (PCT): CPT

## 2022-01-10 PROCEDURE — 82570 ASSAY OF URINE CREATININE: CPT

## 2022-01-10 PROCEDURE — 82550 ASSAY OF CK (CPK): CPT

## 2022-01-10 PROCEDURE — 6360000004 HC RX CONTRAST MEDICATION: Performed by: EMERGENCY MEDICINE

## 2022-01-10 PROCEDURE — 84300 ASSAY OF URINE SODIUM: CPT

## 2022-01-10 PROCEDURE — 87086 URINE CULTURE/COLONY COUNT: CPT

## 2022-01-10 PROCEDURE — 73630 X-RAY EXAM OF FOOT: CPT

## 2022-01-10 PROCEDURE — 82746 ASSAY OF FOLIC ACID SERUM: CPT

## 2022-01-10 PROCEDURE — 75635 CT ANGIO ABDOMINAL ARTERIES: CPT

## 2022-01-10 PROCEDURE — 87635 SARS-COV-2 COVID-19 AMP PRB: CPT

## 2022-01-10 PROCEDURE — 99223 1ST HOSP IP/OBS HIGH 75: CPT | Performed by: INTERNAL MEDICINE

## 2022-01-10 PROCEDURE — 85025 COMPLETE CBC W/AUTO DIFF WBC: CPT

## 2022-01-10 PROCEDURE — 80076 HEPATIC FUNCTION PANEL: CPT

## 2022-01-10 PROCEDURE — 80048 BASIC METABOLIC PNL TOTAL CA: CPT

## 2022-01-10 PROCEDURE — 86140 C-REACTIVE PROTEIN: CPT

## 2022-01-10 PROCEDURE — 36415 COLL VENOUS BLD VENIPUNCTURE: CPT

## 2022-01-10 PROCEDURE — 81001 URINALYSIS AUTO W/SCOPE: CPT

## 2022-01-10 PROCEDURE — 2580000003 HC RX 258: Performed by: INTERNAL MEDICINE

## 2022-01-10 PROCEDURE — 1200000000 HC SEMI PRIVATE

## 2022-01-10 PROCEDURE — 6370000000 HC RX 637 (ALT 250 FOR IP): Performed by: STUDENT IN AN ORGANIZED HEALTH CARE EDUCATION/TRAINING PROGRAM

## 2022-01-10 PROCEDURE — 6360000002 HC RX W HCPCS: Performed by: STUDENT IN AN ORGANIZED HEALTH CARE EDUCATION/TRAINING PROGRAM

## 2022-01-10 PROCEDURE — 82607 VITAMIN B-12: CPT

## 2022-01-10 PROCEDURE — 99284 EMERGENCY DEPT VISIT MOD MDM: CPT

## 2022-01-10 PROCEDURE — 83735 ASSAY OF MAGNESIUM: CPT

## 2022-01-10 PROCEDURE — 87040 BLOOD CULTURE FOR BACTERIA: CPT

## 2022-01-10 PROCEDURE — 83935 ASSAY OF URINE OSMOLALITY: CPT

## 2022-01-10 PROCEDURE — 83605 ASSAY OF LACTIC ACID: CPT

## 2022-01-10 PROCEDURE — 80307 DRUG TEST PRSMV CHEM ANLYZR: CPT

## 2022-01-10 RX ORDER — LANOLIN ALCOHOL/MO/W.PET/CERES
100 CREAM (GRAM) TOPICAL DAILY
Status: DISCONTINUED | OUTPATIENT
Start: 2022-01-10 | End: 2022-01-24 | Stop reason: HOSPADM

## 2022-01-10 RX ORDER — ACETAMINOPHEN 325 MG/1
650 TABLET ORAL EVERY 6 HOURS PRN
Status: DISCONTINUED | OUTPATIENT
Start: 2022-01-10 | End: 2022-01-24 | Stop reason: HOSPADM

## 2022-01-10 RX ORDER — ACETAMINOPHEN 650 MG
TABLET, EXTENDED RELEASE ORAL PRN
Status: DISCONTINUED | OUTPATIENT
Start: 2022-01-10 | End: 2022-01-24 | Stop reason: HOSPADM

## 2022-01-10 RX ORDER — LORAZEPAM 2 MG/ML
3 INJECTION INTRAMUSCULAR
Status: DISCONTINUED | OUTPATIENT
Start: 2022-01-10 | End: 2022-01-24 | Stop reason: HOSPADM

## 2022-01-10 RX ORDER — ONDANSETRON 2 MG/ML
4 INJECTION INTRAMUSCULAR; INTRAVENOUS EVERY 6 HOURS PRN
Status: DISCONTINUED | OUTPATIENT
Start: 2022-01-10 | End: 2022-01-24 | Stop reason: HOSPADM

## 2022-01-10 RX ORDER — SODIUM CHLORIDE 9 MG/ML
INJECTION, SOLUTION INTRAVENOUS CONTINUOUS
Status: DISCONTINUED | OUTPATIENT
Start: 2022-01-10 | End: 2022-01-12

## 2022-01-10 RX ORDER — 0.9 % SODIUM CHLORIDE 0.9 %
1000 INTRAVENOUS SOLUTION INTRAVENOUS ONCE
Status: COMPLETED | OUTPATIENT
Start: 2022-01-10 | End: 2022-01-10

## 2022-01-10 RX ORDER — SODIUM CHLORIDE 9 MG/ML
25 INJECTION, SOLUTION INTRAVENOUS PRN
Status: DISCONTINUED | OUTPATIENT
Start: 2022-01-10 | End: 2022-01-24 | Stop reason: HOSPADM

## 2022-01-10 RX ORDER — POTASSIUM CHLORIDE 20 MEQ/1
40 TABLET, EXTENDED RELEASE ORAL PRN
Status: DISCONTINUED | OUTPATIENT
Start: 2022-01-10 | End: 2022-01-24 | Stop reason: HOSPADM

## 2022-01-10 RX ORDER — CLINDAMYCIN PHOSPHATE 900 MG/50ML
900 INJECTION INTRAVENOUS ONCE
Status: DISCONTINUED | OUTPATIENT
Start: 2022-01-10 | End: 2022-01-10

## 2022-01-10 RX ORDER — ONDANSETRON 4 MG/1
4 TABLET, ORALLY DISINTEGRATING ORAL EVERY 8 HOURS PRN
Status: DISCONTINUED | OUTPATIENT
Start: 2022-01-10 | End: 2022-01-24 | Stop reason: HOSPADM

## 2022-01-10 RX ORDER — LORAZEPAM 2 MG/ML
1 INJECTION INTRAMUSCULAR
Status: DISCONTINUED | OUTPATIENT
Start: 2022-01-10 | End: 2022-01-24 | Stop reason: HOSPADM

## 2022-01-10 RX ORDER — LORAZEPAM 1 MG/1
3 TABLET ORAL
Status: DISCONTINUED | OUTPATIENT
Start: 2022-01-10 | End: 2022-01-24 | Stop reason: HOSPADM

## 2022-01-10 RX ORDER — SODIUM CHLORIDE 0.9 % (FLUSH) 0.9 %
5-40 SYRINGE (ML) INJECTION EVERY 12 HOURS SCHEDULED
Status: DISCONTINUED | OUTPATIENT
Start: 2022-01-10 | End: 2022-01-24 | Stop reason: HOSPADM

## 2022-01-10 RX ORDER — POTASSIUM CHLORIDE 20 MEQ/1
40 TABLET, EXTENDED RELEASE ORAL ONCE
Status: COMPLETED | OUTPATIENT
Start: 2022-01-10 | End: 2022-01-10

## 2022-01-10 RX ORDER — POLYETHYLENE GLYCOL 3350 17 G/17G
17 POWDER, FOR SOLUTION ORAL DAILY PRN
Status: DISCONTINUED | OUTPATIENT
Start: 2022-01-10 | End: 2022-01-24 | Stop reason: HOSPADM

## 2022-01-10 RX ORDER — LORAZEPAM 2 MG/ML
2 INJECTION INTRAMUSCULAR
Status: DISCONTINUED | OUTPATIENT
Start: 2022-01-10 | End: 2022-01-24 | Stop reason: HOSPADM

## 2022-01-10 RX ORDER — LORAZEPAM 1 MG/1
4 TABLET ORAL
Status: DISCONTINUED | OUTPATIENT
Start: 2022-01-10 | End: 2022-01-24 | Stop reason: HOSPADM

## 2022-01-10 RX ORDER — ACETAMINOPHEN 650 MG/1
650 SUPPOSITORY RECTAL EVERY 6 HOURS PRN
Status: DISCONTINUED | OUTPATIENT
Start: 2022-01-10 | End: 2022-01-24 | Stop reason: HOSPADM

## 2022-01-10 RX ORDER — POTASSIUM CHLORIDE 7.45 MG/ML
10 INJECTION INTRAVENOUS PRN
Status: DISCONTINUED | OUTPATIENT
Start: 2022-01-10 | End: 2022-01-24 | Stop reason: HOSPADM

## 2022-01-10 RX ORDER — LORAZEPAM 1 MG/1
1 TABLET ORAL
Status: DISCONTINUED | OUTPATIENT
Start: 2022-01-10 | End: 2022-01-24 | Stop reason: HOSPADM

## 2022-01-10 RX ORDER — MAGNESIUM SULFATE IN WATER 40 MG/ML
2000 INJECTION, SOLUTION INTRAVENOUS PRN
Status: DISCONTINUED | OUTPATIENT
Start: 2022-01-10 | End: 2022-01-24 | Stop reason: HOSPADM

## 2022-01-10 RX ORDER — SODIUM CHLORIDE 0.9 % (FLUSH) 0.9 %
5-40 SYRINGE (ML) INJECTION PRN
Status: DISCONTINUED | OUTPATIENT
Start: 2022-01-10 | End: 2022-01-24 | Stop reason: HOSPADM

## 2022-01-10 RX ORDER — MULTIVITAMIN WITH IRON
1 TABLET ORAL DAILY
Status: DISCONTINUED | OUTPATIENT
Start: 2022-01-10 | End: 2022-01-24 | Stop reason: HOSPADM

## 2022-01-10 RX ORDER — LORAZEPAM 2 MG/ML
4 INJECTION INTRAMUSCULAR
Status: DISCONTINUED | OUTPATIENT
Start: 2022-01-10 | End: 2022-01-24 | Stop reason: HOSPADM

## 2022-01-10 RX ORDER — LORAZEPAM 1 MG/1
2 TABLET ORAL
Status: DISCONTINUED | OUTPATIENT
Start: 2022-01-10 | End: 2022-01-24 | Stop reason: HOSPADM

## 2022-01-10 RX ADMIN — CEFEPIME HYDROCHLORIDE 2000 MG: 2 INJECTION, POWDER, FOR SOLUTION INTRAVENOUS at 18:27

## 2022-01-10 RX ADMIN — SODIUM CHLORIDE 1000 ML: 9 INJECTION, SOLUTION INTRAVENOUS at 17:10

## 2022-01-10 RX ADMIN — POTASSIUM CHLORIDE 40 MEQ: 1500 TABLET, EXTENDED RELEASE ORAL at 16:38

## 2022-01-10 RX ADMIN — IOPAMIDOL 80 ML: 755 INJECTION, SOLUTION INTRAVENOUS at 18:46

## 2022-01-10 RX ADMIN — NITROGLYCERIN 1 INCH: 20 OINTMENT TOPICAL at 14:39

## 2022-01-10 RX ADMIN — VANCOMYCIN HYDROCHLORIDE 1500 MG: 5 INJECTION, POWDER, LYOPHILIZED, FOR SOLUTION INTRAVENOUS at 19:45

## 2022-01-10 RX ADMIN — SODIUM CHLORIDE: 9 INJECTION, SOLUTION INTRAVENOUS at 18:36

## 2022-01-10 ASSESSMENT — PAIN DESCRIPTION - LOCATION: LOCATION: BACK

## 2022-01-10 ASSESSMENT — ENCOUNTER SYMPTOMS
CHEST TIGHTNESS: 0
PHOTOPHOBIA: 0
VOMITING: 0
ABDOMINAL DISTENTION: 0
ABDOMINAL PAIN: 1
COUGH: 0
CHOKING: 0
CONSTIPATION: 0
NAUSEA: 0
DIARRHEA: 0
SHORTNESS OF BREATH: 0
STRIDOR: 0

## 2022-01-10 ASSESSMENT — PAIN SCALES - GENERAL
PAINLEVEL_OUTOF10: 0
PAINLEVEL_OUTOF10: 10

## 2022-01-10 ASSESSMENT — PAIN DESCRIPTION - ORIENTATION: ORIENTATION: LOWER

## 2022-01-10 NOTE — ED PROVIDER NOTES
[facility] ENCOUNTER          Pt Name: Daisy Burroughs  MRN: [id number]  [place] 1960  Date of evaluation: 1/10/2022  Treating Resident Physician: Daniel Barros MD  Supervising Physician: Sujey Bird COMPLAINT       Chief Complaint   Patient presents with    Foot Pain     bilateral    Numbness     bilateral     History obtained from the patient. HISTORY OF PRESENT ILLNESS    HPI  Daisy Burroughs is a 63 y.o. male who presents to the emergency department for evaluation of bilateral foot wounds. Patient is homeless, states that last night he stated a friend's house, noted that there were black spots on his feet. This morning he noticed that he has decreased sensation to his ankles. States that when he walks he has pain, at rest no pain. Out walking pain can reach 10 out of 10. Denies any weakness. Is a smoker, drinks at least 6 beers per day, does not have regular source for food, does not have diabetes that he knows of, has never seen a podiatrist.  The patient has no other acute complaints at this time. REVIEW OF SYSTEMS   Review of Systems   Constitutional: Negative for fatigue and fever. HENT: Negative. Eyes: Negative for photophobia. Respiratory: Negative for cough, choking, chest tightness, shortness of breath and stridor. Cardiovascular: Negative for chest pain, palpitations and leg swelling. Gastrointestinal: Positive for abdominal pain (he says he thinks he is just hungry). Negative for abdominal distention, constipation, diarrhea, nausea and vomiting. Genitourinary: Negative for frequency, hematuria and urgency. Musculoskeletal: Positive for gait problem (due to pain). Negative for arthralgias, myalgias, neck pain and neck stiffness. Neurological: Positive for numbness (bilateral feet). Negative for dizziness, weakness, light-headedness and headaches.    Hematological: Negative for adenopathy. Does not bruise/bleed easily.           PAST MEDICAL AND SURGICAL HISTORY     Past Medical History:   Diagnosis Date    Arthritis     Substance induced mood disorder ([facility] [address]      Past Surgical History:   Procedure Laterality Date    LEG SURGERY           MEDICATIONS     Current Facility-Administered Medications:     povidone-iodine (BETADINE) 10 % external solution, , Topical, PRN, Genie Lucas MD    vancomycin (VANCOCIN) 1,500 mg in dextrose 5 % 500 mL IVPB, 1,500 mg, IntraVENous, Once, Genie Lucas MD, Last Rate: 250 mL/hr at 01/10/22 1945, 1,500 mg at 01/10/22 1945    piperacillin-tazobactam (ZOSYN) 3,375 mg in dextrose 5 % 50 mL IVPB extended infusion (mini-bag), 3,375 mg, IntraVENous, Q8H, Irish Mcfadden MD    sodium chloride flush 0.9 % injection 5-40 mL, 5-40 mL, IntraVENous, 2 times per day, Irish Mcfadden MD    sodium chloride flush 0.9 % injection 5-40 mL, 5-40 mL, IntraVENous, PRN, Irish Mcfadden MD    0.9 % sodium chloride infusion, 25 mL, IntraVENous, PRN, Irish Mcfadden MD    enoxaparin (LOVENOX) injection 40 mg, 40 mg, SubCUTAneous, Q24H, Irish Mcfadden MD    ondansetron (ZOFRAN-ODT) disintegrating tablet 4 mg, 4 mg, Oral, Q8H PRN **OR** ondansetron (ZOFRAN) injection 4 mg, 4 mg, IntraVENous, Q6H PRN, Irish Mcfadden MD    polyethylene glycol [facility]) packet 17 g, 17 g, Oral, Daily PRN, Irish Mcfadden MD    acetaminophen (TYLENOL) tablet 650 mg, 650 mg, Oral, Q6H PRN **OR** acetaminophen (TYLENOL) suppository 650 mg, 650 mg, Rectal, Q6H PRN, Irish Mcfadden MD    0.9 % sodium chloride infusion, , IntraVENous, Continuous, Irish Mcfadden MD, Last Rate: 75 mL/hr at 01/10/22 1836, New Bag at 01/10/22 1836    thiamine tablet 100 mg, 100 mg, Oral, Daily, Irish Mcfadden MD    multivitamin 1 tablet, 1 tablet, Oral, Daily, Irish Mcfadden MD    potassium chloride (KLOR-CON M) extended release tablet 40 mEq, 40 mEq, Oral, PRN **OR** potassium bicarb-citric acid (EFFER-K) effervescent tablet 40 mEq, 40 mEq, Oral, PRN **OR** potassium chloride 10 mEq/100 mL IVPB (Peripheral Line), 10 mEq, IntraVENous, PRN, Lennox Hey, MD    magnesium sulfate 2000 mg in 50 mL IVPB premix, 2,000 mg, IntraVENous, PRN, Lennox Hey, MD    LORazepam (ATIVAN) tablet 1 mg, 1 mg, Oral, Q1H PRN **OR** LORazepam (ATIVAN) injection 1 mg, 1 mg, IntraVENous, Q1H PRN **OR** LORazepam (ATIVAN) tablet 2 mg, 2 mg, Oral, Q1H PRN **OR** LORazepam (ATIVAN) injection 2 mg, 2 mg, IntraVENous, Q1H PRN **OR** LORazepam (ATIVAN) tablet 3 mg, 3 mg, Oral, Q1H PRN **OR** LORazepam (ATIVAN) injection 3 mg, 3 mg, IntraVENous, Q1H PRN **OR** LORazepam (ATIVAN) tablet 4 mg, 4 mg, Oral, Q1H PRN **OR** LORazepam (ATIVAN) injection 4 mg, 4 mg, IntraVENous, Q1H PRN, Lennox Hey, MD  No current outpatient medications on file. SOCIAL HISTORY     Social History     Social History Narrative    Not on file     Social History     Tobacco Use    Smoking status: Current Every Day Smoker     Packs/day: 1.50     Years: 47.00     Pack years: 70.50    Smokeless tobacco: Never Used   Substance Use Topics    Alcohol use: Yes     Comment: 12 cans beer/day     Drug use: Not Currently     Frequency: 1.0 times per week     Types: Cocaine         ALLERGIES   No Known Allergies      FAMILY HISTORY     Family History   Problem Relation Age of Onset    Heart Attack Mother     Cancer Father          PREVIOUS RECORDS   Previous records reviewed: Medical, past surgical, medications, allergies        PHYSICAL EXAM     ED Triage Vitals [01/10/22 1234]   BP Temp Temp src Pulse Resp SpO2 Height Weight   137/81 98.3 °F (36.8 °C) -- 100 16 100 % 6' (1.829 m) 140 lb (63.5 kg)     Initial vital signs and nursing assessment reviewed and normal. Body mass index is 18.99 kg/m². Pulsoximetry is normal per my interpretation.     Additional Vital Signs:  Vitals:    01/10/22 1946   BP: (!) 145/75 Pulse: 97   Resp: 18   Temp:    SpO2: 98%       Physical Exam  Constitutional:       Appearance: Normal appearance. He is not ill-appearing. HENT:      Head: Normocephalic and atraumatic. Right Ear: External ear normal.      Left Ear: External ear normal.      Nose: No congestion or rhinorrhea. Mouth/Throat:      Mouth: Mucous membranes are moist.      Pharynx: Oropharynx is clear. Eyes:      General: No scleral icterus. Extraocular Movements: Extraocular movements intact. Conjunctiva/sclera: Conjunctivae normal.      Pupils: Pupils are equal, round, and reactive to light. Cardiovascular:      Rate and Rhythm: Normal rate and regular rhythm. Pulses: Normal pulses. Heart sounds: No murmur heard. No gallop. Pulmonary:      Effort: Pulmonary effort is normal. No respiratory distress. Breath sounds: Normal breath sounds. No wheezing or rales. Chest:      Chest wall: No tenderness. Abdominal:      General: Abdomen is flat. Bowel sounds are normal. There is no distension. Palpations: Abdomen is soft. Tenderness: There is no abdominal tenderness. There is no right CVA tenderness, left CVA tenderness, guarding or rebound. Musculoskeletal:         General: Normal range of motion. Right lower leg: No edema. Left lower leg: No edema. Comments: See pictures below     Skin:     Capillary Refill: Capillary refill takes 2 to 3 seconds. Findings: Lesion present. Neurological:      Mental Status: He is alert and oriented to person, place, and time. Sensory: Sensory deficit (Bilateral decreased sensation below ankles) present. Motor: No weakness. Gait: Gait abnormal (antalgic). MEDICAL DECISION MAKING   Initial Assessment and Plan:    Hello, have the patient for admission. Niyah Maurice, room 19, MRN [id number].   This is a 82-year-old homeless male, history of alcohol usage disorder, tobacco use, presenting for 1/10/22 1945)   piperacillin-tazobactam (ZOSYN) 3,375 mg in dextrose 5 % 50 mL IVPB extended infusion (mini-bag) (has no administration in time range)   sodium chloride flush 0.9 % injection 5-40 mL (has no administration in time range)   sodium chloride flush 0.9 % injection 5-40 mL (has no administration in time range)   0.9 % sodium chloride infusion (has no administration in time range)   enoxaparin (LOVENOX) injection 40 mg (has no administration in time range)   ondansetron (ZOFRAN-ODT) disintegrating tablet 4 mg (has no administration in time range)     Or   ondansetron (ZOFRAN) injection 4 mg (has no administration in time range)   polyethylene glycol (GLYCOLAX) packet 17 g (has no administration in time range)   acetaminophen (TYLENOL) tablet 650 mg (has no administration in time range)     Or   acetaminophen (TYLENOL) suppository 650 mg (has no administration in time range)   0.9 % sodium chloride infusion ( IntraVENous New Bag 1/10/22 1836)   thiamine tablet 100 mg (has no administration in time range)   multivitamin 1 tablet (has no administration in time range)   potassium chloride (KLOR-CON M) extended release tablet 40 mEq (has no administration in time range)     Or   potassium bicarb-citric acid (EFFER-K) effervescent tablet 40 mEq (has no administration in time range)     Or   potassium chloride 10 mEq/100 mL IVPB (Peripheral Line) (has no administration in time range)   magnesium sulfate 2000 mg in 50 mL IVPB premix (has no administration in time range)   LORazepam (ATIVAN) tablet 1 mg (has no administration in time range)     Or   LORazepam (ATIVAN) injection 1 mg (has no administration in time range)     Or   LORazepam (ATIVAN) tablet 2 mg (has no administration in time range)     Or   LORazepam (ATIVAN) injection 2 mg (has no administration in time range)     Or   LORazepam (ATIVAN) tablet 3 mg (has no administration in time range)     Or   LORazepam (ATIVAN) injection 3 mg (has no administration in time range)     Or   LORazepam (ATIVAN) tablet 4 mg (has no administration in time range)     Or   LORazepam (ATIVAN) injection 4 mg (has no administration in time range)   nitroglycerin (NITRO-BID) 2 % ointment 1 inch (1 inch Topical Given 1/10/22 1439)   potassium chloride (KLOR-CON M) extended release tablet 40 mEq (40 mEq Oral Given 1/10/22 1638)   0.9 % sodium chloride bolus (1,000 mLs IntraVENous New Bag 1/10/22 1710)   cefepime (MAXIPIME) 2000 mg IVPB minibag (0 mg IntraVENous Stopped 1/10/22 1859)   iopamidol (ISOVUE-370) 76 % injection 80 mL (80 mLs IntraVENous Given 1/10/22 1846)         ED COURSE            MEDICATION CHANGES     New Prescriptions    No medications on file         FINAL DISPOSITION     Final diagnoses:   Frostbite of both feet, initial encounter   Wound infection     Condition: condition: stable  Dispo: Admit to med/surg floor      This transcription was electronically signed. Parts of this transcriptions may have been dictated by use of voice recognition software and electronically transcribed, and parts may have been transcribed with the assistance of an ED scribe. The transcription may contain errors not detected in proofreading. Please refer to my supervising physician's documentation if my documentation differs.     Electronically Signed: Calvin Contreras MD, 01/10/22, 7:57 PM         Calvin Contreras MD  Resident  01/10/22 8592

## 2022-01-10 NOTE — ED NOTES
Pt remains stable, changed to hospital gown. Labs drawn.      Emmanuel Gallego, LEFTY  01/10/22 [facility] LEFTY Hoffman  01/10/22 2932

## 2022-01-10 NOTE — ED NOTES
ED nurse-to-nurse bedside report    Chief Complaint   Patient presents with    Foot Pain     bilateral    Numbness     bilateral      LOC: alert and orientated to name, place, date  Vital signs   Vitals:    01/10/22 1234   BP: 137/81   Pulse: 100   Resp: 16   Temp: 98.3 °F (36.8 °C)   SpO2: 100%   Weight: 140 lb (63.5 kg)   Height: 6' (1.829 m)      Pain:    Pain Interventions: NA  Pain Goal: 2/10  Oxygen: No    Current needs required RA   Telemetry: No  LDAs:    Continuous Infusions:   Mobility: Requires assistance * 1  Teague Fall Risk Score: No flowsheet data found.   Fall Interventions: call light in reach, assistance with transferring  Report given to: Theresa See RN  01/10/22 5149

## 2022-01-10 NOTE — ED TRIAGE NOTES
Pt to ED with bilateral foot numbness. Pt states that he has not seen his feet in about 2 weeks. Pt states he noticed they \"were black\" today so he came here. Upon assessment, pt right foot appears red, all five toes appear black, black wound to the right big toe. Left foot appears to have a black wound on the top below the big toe and there appears to be red/purple color change to the left second toe. Pt states that he is unable to feel his feet. Pt states he has sensation \"slightly above the ankle. \" No pulses were found with doppler on either feet. Will ask for a second nurse to atempt to find pulses. VSS.

## 2022-01-10 NOTE — ED NOTES
Pt refusing to change in hospital gown or removed his jackets for BP check     Jefferson Chavez RN  01/10/22 1794

## 2022-01-10 NOTE — ED NOTES
Nitro paste placed on pt's right toes and left toe, covered with plastic bag and socks, per Dr Gallardo's verbal order  Pt in no distress, resting in bed.      Jefferson Lozano RN  01/10/22 9238

## 2022-01-10 NOTE — H&P
Hospitalist - History & Physical      Patient: Kendra Bañuelos    Unit/Bed:19/019A  YOB: 1960  MRN: [id number]   Acct: [de-identified]   PCP: No primary care provider on file. Date of Service: Pt seen/examined on 01/10/22  and Admitted to Inpatient with expected LOS greater than two midnights due to medical therapy. Chief Complaint:  Foot pain    Assessment and Plan:-  1. B/l LE ulcers/eschar/?frostbite on RLE   -Patient does have palpable pulses. Podiatry is following for wound care   -We will check CTA to check for peripheral vascular disease. Patient does not have any signs of critical lower extremity ischemia. We will hold off on anticoagulation for now   - Check CK level   - LA normal   - broad spectrum abx for now    2. History of alcohol abuse   -Patient has been drinking 6 drinks every day. Unable to tell me for how long. States that he has never had seizures in the past due to alcohol withdrawal.   -We will keep on CIWA protocol, IV fluids, thiamine, folic acid, multivitamin    3. elevated LFTs likely due to alcoholic hepatitis   -We will monitor for now. Consider further work-up if not resolving. 4. Hyponatremia -etiology to be determined   -Check urine studies. -Gentle IV fluids for now. 5. history of cocaine abuse   -Check urine drug screen. History Of Present Illness:      65-year-old homeless gentleman with past medical history of alcohol and cocaine use came to ED due to bilateral lower extremity pain and numbness. Patient is an extremely poor historian. Difficult to obtain history. Patient states that he has had lower extremity numbness and pain for about 2 weeks. He is not sure how long he has had ulcers in the foot. Patient states that he just came in because it was hurting. He does admit to drinking every day about 6 drinks for several years. Denies any medical problems. No other history is available at this time.   In ED, patient's labs showed significant electrolyte abnormalities. Physical exam showed bilateral lower extremity eschars along with ulcers and discolored toes. Podiatry was consulted and patient was admitted. Past Medical History:        Diagnosis Date    Arthritis     Substance induced mood disorder ([address]        Past Surgical History:        Procedure Laterality Date    LEG SURGERY         Home Medications:   No current facility-administered medications on file prior to encounter. No current outpatient medications on file prior to encounter. Allergies:    Patient has no known allergies. Social History:    reports that he has been smoking. He has a 70.50 pack-year smoking history. He has never used smokeless tobacco. He reports current alcohol use. He reports previous drug use. Frequency: 1.00 time per week. Drug: Cocaine. Family History:       Problem Relation Age of Onset    Heart Attack Mother     Cancer Father        Diet:  ADULT DIET; Regular    Review of systems:   Pertinent positives as noted in the HPI. All other systems reviewed and negative. PHYSICAL EXAM:  /66   Pulse 108   Temp 98.3 °F (36.8 °C)   Resp 18   Ht 6' (1.829 m)   Wt 140 lb (63.5 kg)   SpO2 97%   BMI 18.99 kg/m²   General appearance: No apparent distress, appears stated age  HEENT: Normal cephalic, atraumatic without obvious deformity. Pupils equal, round, and reactive to light. Extra ocular muscles intact. Conjunctivae/corneas clear. Neck: Supple, with full range of motion. No jugular venous distention. Trachea midline. Respiratory:  Normal respiratory effort. Clear to auscultation, bilaterally without Rales/Wheezes/Rhonchi. Cardiovascular: Regular rate and rhythm with normal S1/S2 without murmurs, rubs or gallops. Abdomen: Soft, non-tender, non-distended with normal bowel sounds. Musculoskeletal:  No clubbing, cyanosis or edema bilaterally.   Skin: large eschar on dorsum left foot, multiple purple/black discoloration of right foot with surrounding erythema and edema. + DP and +PT. Neurologic:  Neurovascularly intact without any focal sensory/motor deficits. Cranial nerves: II-XII intact, grossly non-focal.  Psychiatric: Alert and oriented, thought content appropriate, normal insight  Capillary Refill: Brisk,< 3 seconds   Peripheral Pulses: +2 palpable, equal bilaterally     Labs:   Recent Labs     01/10/22  1430   WBC 16.7*   HGB 14.4   HCT 40.0*        Recent Labs     01/10/22  1430   *   K 3.0*   CL 88*   CO2 27   BUN 9   CREATININE 0.6   CALCIUM 9.3     Recent Labs     01/10/22  1430   *   ALT 52   BILIDIR 0.6*   BILITOT 1.5*   ALKPHOS 60     No results for input(s): INR in the last 72 hours. No results for input(s): Cheryl Ends in the last 72 hours. Urinalysis:    Lab Results   Component Value Date    NITRU NEGATIVE 06/27/2017    BLOODU NEGATIVE 06/27/2017    SPECGRAV 1.015 06/27/2017    GLUCOSEU NEGATIVE 09/01/2014       Radiology:   XR FOOT RIGHT (MIN 3 VIEWS)   Final Result       1. Postoperative changes in the distal tibia. 2. Abnormal density involving the neck of the fifth metacarpal.   3. Degenerative changes. 4. Diffuse osteopenia. 5. Soft tissue swelling over the dorsum of foot. .               **This report has been created using voice recognition software. It may contain minor errors which are inherent in voice recognition technology. **      Final report electronically signed by DR Melissa Singh on 1/10/2022 2:03 PM      XR FOOT LEFT (MIN 3 VIEWS)   Final Result       1. Stable left foot radiographs, no interval change since previous study dated 30 January 2020.   2. Abnormal density involving the first metatarsophalangeal joint, unchanged. 3. Degenerative change. 4. No definite evidence of fracture, bony destruction or osteomyelitis. .               **This report has been created using voice recognition software.  It may contain minor errors which are inherent in voice recognition technology. **      Final report electronically signed by DR Catie Lopez on 1/10/2022 2:00 PM        XR FOOT LEFT (MIN 3 VIEWS)    Result Date: 1/10/2022  PROCEDURE: XR FOOT LEFT (MIN 3 VIEWS) CLINICAL INFORMATION: frostbite. COMPARISON: Left foot radiographs dated 30 January 2020. TECHNIQUE: 4 views of the left foot. FINDINGS:  There is abnormal density involving the first metatarsophalangeal joint, unchanged since remote radiographs dated 30 January 2020. There is degenerative change. There is no fracture. There is no definite bony destruction noted. There is no radiographic evidence for osteomyelitis. The calcaneus is normal. The soft tissues are normal.      1. Stable left foot radiographs, no interval change since previous study dated 30 January 2020. 2. Abnormal density involving the first metatarsophalangeal joint, unchanged. 3. Degenerative change. 4. No definite evidence of fracture, bony destruction or osteomyelitis. . **This report has been created using voice recognition software. It may contain minor errors which are inherent in voice recognition technology. ** Final report electronically signed by DR Catie Lopez on 1/10/2022 2:00 PM    XR FOOT RIGHT (MIN 3 VIEWS)    Result Date: 1/10/2022  PROCEDURE: XR FOOT RIGHT (MIN 3 VIEWS) CLINICAL INFORMATION: frostbite. COMPARISON: No prior study. TECHNIQUE: 4 views of the right foot. FINDINGS:  There are postoperative changes with an intramedullary jaleesa and screw in the distal tibia. There is abnormal density involving the neck of the fifth metacarpal. There are degenerative changes. There is diffuse osteopenia. There is soft tissue swelling over the dorsum of the foot. There are small calcifications adjacent to the calcaneocuboid articulation. The remaining bony structures are intact. . The soft tissues are normal.      1. Postoperative changes in the distal tibia. 2. Abnormal density involving the neck of the fifth metacarpal. 3. Degenerative changes. 4. Diffuse osteopenia. 5. Soft tissue swelling over the dorsum of foot. . **This report has been created using voice recognition software. It may contain minor errors which are inherent in voice recognition technology. ** Final report electronically signed by DR Kylah Vernon on 1/10/2022 2:03 PM      Electronically signed by Madeline Aguero MD on 1/10/2022 at 5:40 PM

## 2022-01-11 LAB
ALBUMIN SERPL-MCNC: 2.7 G/DL (ref 3.5–5.1)
ALP BLD-CCNC: 49 U/L (ref 38–126)
ALT SERPL-CCNC: 41 U/L (ref 11–66)
ANION GAP SERPL CALCULATED.3IONS-SCNC: 11 MEQ/L (ref 8–16)
AST SERPL-CCNC: 93 U/L (ref 5–40)
BASOPHILS # BLD: 0.6 %
BASOPHILS ABSOLUTE: 0.1 THOU/MM3 (ref 0–0.1)
BILIRUB SERPL-MCNC: 1.3 MG/DL (ref 0.3–1.2)
BUN BLDV-MCNC: 8 MG/DL (ref 7–22)
CALCIUM SERPL-MCNC: 8.2 MG/DL (ref 8.5–10.5)
CHLORIDE BLD-SCNC: 94 MEQ/L (ref 98–111)
CO2: 23 MEQ/L (ref 23–33)
CREAT SERPL-MCNC: 0.5 MG/DL (ref 0.4–1.2)
EOSINOPHIL # BLD: 0.3 %
EOSINOPHILS ABSOLUTE: 0 THOU/MM3 (ref 0–0.4)
ERYTHROCYTE [DISTWIDTH] IN BLOOD BY AUTOMATED COUNT: 13.4 % (ref 11.5–14.5)
ERYTHROCYTE [DISTWIDTH] IN BLOOD BY AUTOMATED COUNT: 49.7 FL (ref 35–45)
GFR SERPL CREATININE-BSD FRML MDRD: > 90 ML/MIN/1.73M2
GLUCOSE BLD-MCNC: 103 MG/DL (ref 70–108)
HCT VFR BLD CALC: 33.3 % (ref 42–52)
HEMOGLOBIN: 11.6 GM/DL (ref 14–18)
IMMATURE GRANS (ABS): 0.48 THOU/MM3 (ref 0–0.07)
IMMATURE GRANULOCYTES: 3.1 %
LYMPHOCYTES # BLD: 8.2 %
LYMPHOCYTES ABSOLUTE: 1.3 THOU/MM3 (ref 1–4.8)
MAGNESIUM: 1.6 MG/DL (ref 1.6–2.4)
MCH RBC QN AUTO: 35.2 PG (ref 26–33)
MCHC RBC AUTO-ENTMCNC: 34.8 GM/DL (ref 32.2–35.5)
MCV RBC AUTO: 100.9 FL (ref 80–94)
MONOCYTES # BLD: 11.9 %
MONOCYTES ABSOLUTE: 1.9 THOU/MM3 (ref 0.4–1.3)
NUCLEATED RED BLOOD CELLS: 0 /100 WBC
OSMOLALITY CALCULATION: 255.7 MOSMOL/KG (ref 275–300)
PLATELET # BLD: 188 THOU/MM3 (ref 130–400)
PMV BLD AUTO: 10.1 FL (ref 9.4–12.4)
POTASSIUM REFLEX MAGNESIUM: 3.2 MEQ/L (ref 3.5–5.2)
RBC # BLD: 3.3 MILL/MM3 (ref 4.7–6.1)
SEG NEUTROPHILS: 75.9 %
SEGMENTED NEUTROPHILS ABSOLUTE COUNT: 11.8 THOU/MM3 (ref 1.8–7.7)
SODIUM BLD-SCNC: 128 MEQ/L (ref 135–145)
TOTAL PROTEIN: 5.4 G/DL (ref 6.1–8)
WBC # BLD: 15.6 THOU/MM3 (ref 4.8–10.8)

## 2022-01-11 PROCEDURE — 36415 COLL VENOUS BLD VENIPUNCTURE: CPT

## 2022-01-11 PROCEDURE — 87205 SMEAR GRAM STAIN: CPT

## 2022-01-11 PROCEDURE — 6370000000 HC RX 637 (ALT 250 FOR IP): Performed by: INTERNAL MEDICINE

## 2022-01-11 PROCEDURE — 87147 CULTURE TYPE IMMUNOLOGIC: CPT

## 2022-01-11 PROCEDURE — 1200000000 HC SEMI PRIVATE

## 2022-01-11 PROCEDURE — 2580000003 HC RX 258: Performed by: INTERNAL MEDICINE

## 2022-01-11 PROCEDURE — 87186 SC STD MICRODIL/AGAR DIL: CPT

## 2022-01-11 PROCEDURE — 80053 COMPREHEN METABOLIC PANEL: CPT

## 2022-01-11 PROCEDURE — 87077 CULTURE AEROBIC IDENTIFY: CPT

## 2022-01-11 PROCEDURE — 6360000002 HC RX W HCPCS: Performed by: INTERNAL MEDICINE

## 2022-01-11 PROCEDURE — 83735 ASSAY OF MAGNESIUM: CPT

## 2022-01-11 PROCEDURE — 87075 CULTR BACTERIA EXCEPT BLOOD: CPT

## 2022-01-11 PROCEDURE — 87070 CULTURE OTHR SPECIMN AEROBIC: CPT

## 2022-01-11 PROCEDURE — 85025 COMPLETE CBC W/AUTO DIFF WBC: CPT

## 2022-01-11 PROCEDURE — 0KBW0ZZ EXCISION OF LEFT FOOT MUSCLE, OPEN APPROACH: ICD-10-PCS | Performed by: STUDENT IN AN ORGANIZED HEALTH CARE EDUCATION/TRAINING PROGRAM

## 2022-01-11 RX ORDER — HYDROCODONE BITARTRATE AND ACETAMINOPHEN 5; 325 MG/1; MG/1
1 TABLET ORAL EVERY 6 HOURS PRN
Status: DISCONTINUED | OUTPATIENT
Start: 2022-01-11 | End: 2022-01-24 | Stop reason: HOSPADM

## 2022-01-11 RX ADMIN — PIPERACILLIN AND TAZOBACTAM 3375 MG: 3; .375 INJECTION, POWDER, LYOPHILIZED, FOR SOLUTION INTRAVENOUS at 12:06

## 2022-01-11 RX ADMIN — HYDROCODONE BITARTRATE AND ACETAMINOPHEN 1 TABLET: 5; 325 TABLET ORAL at 18:58

## 2022-01-11 RX ADMIN — Medication 1 TABLET: at 08:32

## 2022-01-11 RX ADMIN — POTASSIUM CHLORIDE 40 MEQ: 1500 TABLET, EXTENDED RELEASE ORAL at 12:02

## 2022-01-11 RX ADMIN — VANCOMYCIN HYDROCHLORIDE 1000 MG: 1 INJECTION, POWDER, LYOPHILIZED, FOR SOLUTION INTRAVENOUS at 18:58

## 2022-01-11 RX ADMIN — VANCOMYCIN HYDROCHLORIDE 1000 MG: 1 INJECTION, POWDER, LYOPHILIZED, FOR SOLUTION INTRAVENOUS at 08:57

## 2022-01-11 RX ADMIN — PIPERACILLIN AND TAZOBACTAM 3375 MG: 3; .375 INJECTION, POWDER, LYOPHILIZED, FOR SOLUTION INTRAVENOUS at 20:41

## 2022-01-11 RX ADMIN — LORAZEPAM 2 MG: 2 INJECTION INTRAMUSCULAR; INTRAVENOUS at 22:55

## 2022-01-11 RX ADMIN — ENOXAPARIN SODIUM 40 MG: 100 INJECTION SUBCUTANEOUS at 20:35

## 2022-01-11 RX ADMIN — HYDROCODONE BITARTRATE AND ACETAMINOPHEN 1 TABLET: 5; 325 TABLET ORAL at 10:39

## 2022-01-11 RX ADMIN — LORAZEPAM 1 MG: 1 TABLET ORAL at 20:35

## 2022-01-11 RX ADMIN — SODIUM CHLORIDE: 9 INJECTION, SOLUTION INTRAVENOUS at 11:54

## 2022-01-11 RX ADMIN — PIPERACILLIN AND TAZOBACTAM 3375 MG: 3; .375 INJECTION, POWDER, LYOPHILIZED, FOR SOLUTION INTRAVENOUS at 04:30

## 2022-01-11 RX ADMIN — Medication 100 MG: at 08:32

## 2022-01-11 ASSESSMENT — PAIN DESCRIPTION - PAIN TYPE
TYPE: CHRONIC PAIN

## 2022-01-11 ASSESSMENT — PAIN DESCRIPTION - LOCATION
LOCATION: BACK

## 2022-01-11 ASSESSMENT — PAIN SCALES - GENERAL
PAINLEVEL_OUTOF10: 8
PAINLEVEL_OUTOF10: 7
PAINLEVEL_OUTOF10: 0
PAINLEVEL_OUTOF10: 9
PAINLEVEL_OUTOF10: 9
PAINLEVEL_OUTOF10: 10

## 2022-01-11 ASSESSMENT — PAIN DESCRIPTION - ORIENTATION: ORIENTATION: LOWER

## 2022-01-11 NOTE — CARE COORDINATION
1/11/22, 1:48 PM EST  DISCHARGE PLANNING EVALUATION:    Ivett Turk       Admitted: 1/10/2022/ [address] day: 1   Location: -01/832-D Reason for admit: Wound infection [T14. 8XXA, L08.9]  Foot pain, bilateral [C24.907, M79.672]  Frostbite of both feet, initial encounter [T33.821A, T33.822A]   PMH:  has a past medical history of Arthritis and Substance induced mood disorder ([facility] [address] Procedure: Need foot surgery? Barriers to Discharge: Podiatry consult: Frostbite bilateral feet. Post-op shoes to patient's bilateral LE; patient is to wear when ambulating for bathroom and transfer privileges Addiction services consult. SW consult. Seizure precautions. Specific bilateral wound care instructions per podiatry. Possible need for surgery. PCP: declined. Readmission Risk Score: 10.5 ( )%  Patient Goals/Plan/Treatment Preferences: Galo Liang is homeless. He stays with friends at times. SW consulted to assist with discharge planning needs. Will follow. Transportation/Food Security/Housekeeping Addressed:  No issues identified.

## 2022-01-11 NOTE — PROGRESS NOTES
HOSPITALIST PROGRESS NOTE    Patient:  Ivett Turk    Unit/Bed:7K-27/027-A  YOB: 1960  MRN: [id number]   PCP: No primary care provider on file. Date of Admission: 1/10/2022  Date of Service: 1/11/2022  Chief Complaint:- Foot pain    Assessment and Plan:  1. Bilateral LE Ulcers/Eschar/?Frostbite on RLE: Patient presents with what appears to be frostbite of the right foot as well as a full-thickness ulceration of the left foot. Patient is afebrile however leukocytosis was present on arrival.  All digits of the right foot appear necrotic, foul-smelling and gangrenous which extends to the metatarsal level. The left foot has an open ulcer on the dorsal aspect near the second digit however does not appear to be necrotic appearing as the right foot. CTA of the abdominal aorta with bilateral runoff shows an area of significant narrowing in the distal right superficial femoral artery with what appears to be relatively normal flow in the left LE circulation. Podiatry consulted. · In-depth conversation was done today along with podiatry resident at bedside. Discussed patient's poor prognosis for the right foot given the necrotic and gangrenous changes. Podiatry to further attempt to salvage tissue however patient is at high risk for metatarsal amputation. · Podiatry to order MRI of the RLE to assess for osteomyelitis. · Continue with broad-spectrum antibiotics with Zosyn and Vancocin started 1/10/2022. Wound cultures and blood cultures pending. 2. History of EtOH Abuse: Reports drinking 6 beers/day. Will continue with CIWA protocol, IV fluids, thiamine, folic acid, multivitamins. 3. Mild Rhabdomyolysis: Etiology likely multifactorial from above. Will trend daily, patient at low risk for acute kidney injury with current level. Will continue with IV hydration. 4. Hepatic Transaminitis on Diffuse Fatty Liver: History of diffuse fatty liver.  Aminotransferases showing initial down trending. 5. Moderate Hypotonic Hyponatremia: Further studies indicate possible extrarenal losses, possible combined component however due to poor solute intake and beer potomania. Continue with gentle IV fluids. 6. Mild Hyperkalemia: Etiology likely due to poor solute intake. Potassium replacement protocols in place. Check magnesium. 7. Disposition: Patient at high risk of right metatarsal amputation given clinical picture. Social work and addiction services consulted due to patient being homeless and history of drug abuse. Subjective (Past 24 hour events):  Patient reports continued discomfort in the LE bilaterally. Vague historian. Confirms that he is homeless and drinks 6 beers a day. States that he lives in a home with others. Patient was seen alongside podiatry resident. It was explained to the patient that the prognosis for his right foot is poor given the necrotic and gangrenous appearance. It was explained that the patient is at high risk for amputation if we are unable to salvage his tissue with wound care and antibiotics. Patient verbalized understanding. HPI:  56-year-old homeless male with PMH of EtOH and cocaine abuse who came to the ED on 1/10/2022 due to bilateral lower extremity pain and numbness. On arrival patient was noted to be an extremely poor historian. Patient did state that he has had lower extremity numbness and pain for about 2 weeks. Not sure how long he has had ulcers in the foot. Admits to drinking every day, about 6 drinks per day for several years. Denied any chronic medical problems. Physical exam in the ED showed bilateral lower extremity eschars along with ulcers and discolored toes. Admission labs showed multiple significant abnormalities which included hyponatremia, hypokalemia, hyperbilirubinemia leukocytosis, macrocytic anemia, rhabdomyolysis. Patient admitted for these bilateral ulcers/eschar and multiple lab abnormalities.     Please see Assessment and Plan for further details on hospital course. Scheduled Meds:   piperacillin-tazobactam  3,375 mg IntraVENous Q8H    sodium chloride flush  5-40 mL IntraVENous 2 times per day    enoxaparin  40 mg SubCUTAneous Q24H    thiamine  100 mg Oral Daily    multivitamin  1 tablet Oral Daily    vancomycin (VANCOCIN) intermittent dosing (placeholder)   Other RX Placeholder    vancomycin  1,000 mg IntraVENous Q12H     Continuous Infusions:   sodium chloride      sodium chloride 75 mL/hr at 01/10/22 1816       PHYSICAL EXAMINATION:  Blood pressure 125/65, pulse 101, temperature 97.4 °F (36.3 °C), temperature source Oral, resp. rate 18, height 6' (1.829 m), weight 140 lb (63.5 kg), SpO2 97 %. Oxygen Delivery -    General: Acute on chronic ill appearing. Appears disheveled. In no acute distress. HEENT:  normocephalic and atraumatic. No scleral icterus. PERR  Neck: supple. No Thyromegaly. Lungs: clear to auscultation. No retractions  Cardiac: RRR. No JVD. Abdomen: soft. Nontender. Extremities: Open ulceration on the dorsum of the left foot. Right foot from metatarsals to toes are covered in Ace wraps soaked in Betadine. See below for appearance of feet prior to bandaging/wound care. Vasculature: delayed capillary refill. Skin: cool and dry in the LE b/l  Psych:  Alert and oriented x3. Lymph:  No supraclavicular adenopathy. Neurologic:  No focal deficit. No seizures            Diagnostic Data: (All radiographs, EKGs, PFTs, and imaging are personally viewed and interpreted unless otherwise noted). CBC:   Recent Labs     01/10/22  1430 01/11/22  0619   WBC 16.7* 15.6*   HGB 14.4 11.6*    188     BMP:    Recent Labs     01/10/22  1430 01/11/22  0619   * 128*   K 3.0* 3.2*   CL 88* 94*   CO2 27 23   BUN 9 8   CREATININE 0.6 0.5   GLUCOSE 130* 103     Ionized Calcium: No results for input(s): IONCA in the last 72 hours.   Magnesium:   Recent Labs     01/11/22  0619   MG 1.6     Phosphorus: No results for input(s): PHOS in the last 72 hours. Hepatic:   Recent Labs     01/10/22  1430 01/11/22  0619   * 93*   ALT 52 41   BILITOT 1.5* 1.3*   ALKPHOS 60 49     Vitamin B12: No components found for: B12  Folate:   Lab Results   Component Value Date    FOLATE 9.8 01/10/2022     IRON: No results found for: IRON  Iron Saturation: No components found for: PERCENTFE  TIBC: No results found for: TIBC  Ferritin: No results found for: FERRITIN  Troponin:   Lab Results   Component Value Date    TROPONINT < 0.010 07/06/2021    TROPONINT < 0.010 09/01/2014     BNP: No results found for: BNP  Lipids: No results found for: LDL  INR: No results found for: INR  ABG: No results found for: PH, PCO2, PO2, HCO3, O2SAT  TSH:   Lab Results   Component Value Date    TSH 1.650 06/27/2017     Sed Rate: No results found for: SEDRATE  CRP:   Lab Results   Component Value Date    CRP 12.22 01/10/2022     UA:   Recent Labs     01/10/22  1945   [address] see below   PHUR see below   [place] see below   BLOODU see below   [address] [facility] [address]   [address] see below   [address] see below   [email or web address] see below   LABCAST 0-4 HYALINE  NONE SEEN     Micro:   Lab Results   Component Value Date    BC No growth-preliminary  01/10/2022     Imaging Quick Reads from Previous 7 Days:  XR FOOT LEFT (MIN 3 VIEWS)    Result Date: 1/10/2022   1. Stable left foot radiographs, no interval change since previous study dated 30 January 2020. 2. Abnormal density involving the first metatarsophalangeal joint, unchanged. 3. Degenerative change. 4. No definite evidence of fracture, bony destruction or osteomyelitis. . **This report has been created using voice recognition software. It may contain minor errors which are inherent in voice recognition technology. ** Final report electronically signed by DR Catie Lopez on 1/10/2022 2:00 PM    XR FOOT RIGHT (MIN 3 VIEWS)    Result Date:

## 2022-01-11 NOTE — ED NOTES
ED to inpatient nurses report    Chief Complaint   Patient presents with    Foot Pain     bilateral    Numbness     bilateral      Present to ED from pt homeless  LOC: alert and orientated to name, place, date  Vital signs   Vitals:    01/10/22 1712 01/10/22 1805 01/10/22 1838 01/10/22 1946   BP: 125/66 118/76 136/75 (!) 145/75   Pulse: 108 101 92 97   Resp: 18 18 18 18   Temp:       SpO2: 97%  99% 98%   Weight:       Height:          Oxygen Baseline RA    Current needs required none Bipap/Cpap No  LDAs:   Peripheral IV 01/10/22 Right Antecubital (Active)     Mobility: Independent  Pending ED orders: none  Present condition: stable    Electronically signed by Amaury Adhikari RN on 1/10/2022 at 8:51 PM     Amaury Adhikari RN  01/10/22 2053

## 2022-01-11 NOTE — CONSULTS
Podiatric Surgery Consult    CC: Frostbite, bilateral LE; full-thickness wound, left foot    HPI:  The patient is a homeless 63 y.o. male with significant past medical history of substance abuse (cocaine use, alcohol) tobacco use, and incarcerated left inguinal hernia who being seen at bedside today on behalf of Dr. Nikita Rand. Patient was admitted yesterday via [place] [id number] ED for frostbite, foot wound, and lab abnormalities. Patient is pleasant, but very poor historian. Patient does not know how long he has had frostbite changes to his right foot nor the wound on his left foot. He thinks they must have been there for several weeks. He relates that his feet have been hurting for several weeks; however when asked by RN prior to getting pain medication, patient stated he cannot feel any pain in his lower legs as they are numb all the time and all of his pain is from his lower back. Patient admits drinking approximately 6 drinks per day daily for the last several years. He currently denies any N/V/F/C/SOB/CP or bilateral calf tenderness. He has no other pedal complaints at this time.     Past Medical History:        Diagnosis Date    Arthritis     Substance induced mood disorder ([facility] [address]        Past Surgical History:        Procedure Laterality Date    LEG SURGERY         Current Medications:    Current Facility-Administered Medications: HYDROcodone-acetaminophen (NORCO) 5-325 MG per tablet 1 tablet, 1 tablet, Oral, Q6H PRN  phosphorus replacement protocol, , Other, RX Placeholder  calcium replacement protocol, , Other, RX Placeholder  povidone-iodine (BETADINE) 10 % external solution, , Topical, PRN  piperacillin-tazobactam (ZOSYN) 3,375 mg in dextrose 5 % 50 mL IVPB extended infusion (mini-bag), 3,375 mg, IntraVENous, Q8H  sodium chloride flush 0.9 % injection 5-40 mL, 5-40 mL, IntraVENous, 2 times per day  sodium chloride flush 0.9 % injection 5-40 mL, 5-40 mL, IntraVENous, PRN  0.9 % sodium chloride infusion, 25 mL, IntraVENous, PRN  enoxaparin (LOVENOX) injection 40 mg, 40 mg, SubCUTAneous, Q24H  ondansetron (ZOFRAN-ODT) disintegrating tablet 4 mg, 4 mg, Oral, Q8H PRN **OR** ondansetron (ZOFRAN) injection 4 mg, 4 mg, IntraVENous, Q6H PRN  polyethylene glycol (GLYCOLAX) packet 17 g, 17 g, Oral, Daily PRN  acetaminophen (TYLENOL) tablet 650 mg, 650 mg, Oral, Q6H PRN **OR** acetaminophen (TYLENOL) suppository 650 mg, 650 mg, Rectal, Q6H PRN  0.9 % sodium chloride infusion, , IntraVENous, Continuous  thiamine tablet 100 mg, 100 mg, Oral, Daily  multivitamin 1 tablet, 1 tablet, Oral, Daily  potassium chloride (KLOR-CON M) extended release tablet 40 mEq, 40 mEq, Oral, PRN **OR** potassium bicarb-citric acid (EFFER-K) effervescent tablet 40 mEq, 40 mEq, Oral, PRN **OR** potassium chloride 10 mEq/100 mL IVPB (Peripheral Line), 10 mEq, IntraVENous, PRN  magnesium sulfate 2000 mg in 50 mL IVPB premix, 2,000 mg, IntraVENous, PRN  LORazepam (ATIVAN) tablet 1 mg, 1 mg, Oral, Q1H PRN **OR** LORazepam (ATIVAN) injection 1 mg, 1 mg, IntraVENous, Q1H PRN **OR** LORazepam (ATIVAN) tablet 2 mg, 2 mg, Oral, Q1H PRN **OR** LORazepam (ATIVAN) injection 2 mg, 2 mg, IntraVENous, Q1H PRN **OR** LORazepam (ATIVAN) tablet 3 mg, 3 mg, Oral, Q1H PRN **OR** LORazepam (ATIVAN) injection 3 mg, 3 mg, IntraVENous, Q1H PRN **OR** LORazepam (ATIVAN) tablet 4 mg, 4 mg, Oral, Q1H PRN **OR** LORazepam (ATIVAN) injection 4 mg, 4 mg, IntraVENous, Q1H PRN  vancomycin (VANCOCIN) intermittent dosing (placeholder), , Other, RX Placeholder  vancomycin 1000 mg IVPB in 250 mL D5W addavial, 1,000 mg, IntraVENous, Q12H    Allergies:  Patient has no known allergies. Social History:    TOBACCO:   reports that he has been smoking. He has a 70.50 pack-year smoking history. He has never used smokeless tobacco.  ETOH:   reports current alcohol use. DRUGS:   reports previous drug use. Frequency: 1.00 time per week. Drug: Cocaine.     Family History:       Problem Relation Age of Onset    Heart Attack Mother     Cancer Father        REVIEW OF SYSTEMS:    Constitutional: Negative for fever, chills, and sudden weight loss or gain. HEENT: Negative for blurry/double vision, ears, nose, or throat pain. Negative for mass. Respiratory: Negative for shortness of breath or hemoptysis. Cardiovascular: Negative for angina, palpitations, or lower extremity edema. Gastrointestinal: Negative for nausea, vomiting, diarrhea, constipation, or abdominal pain. Genitourinary: Negative for increased urination, burning with urination, or hematuria. Musculoskeletal: See above HPI. Integument: See above HPI. Hematology: Negative for easy bruising or easy bleeding. Physical Examination:  General: Awake, alert, and oriented. In no acute distress. Resting in bed. HEENT: Atraumatic, normocephalic. Respiratory: CTA. No rales, rhonchi, or wheezing. Cardiovascular: RRR. Normal S1, S2.  Abdomen: Soft, non-tender, non-distended. Bowel sounds present x4 quadrants. Focused Lower Extremity Examination:    Vitals:    /65   Pulse 101   Temp 97.4 °F (36.3 °C) (Oral)   Resp 18   Ht 6' (1.829 m)   Wt 140 lb (63.5 kg)   SpO2 97%   BMI 18.99 kg/m²      Dermatologic: At time of encounter, patient had plastic bags over his toes and socks over the plastic bags. There were no dressings to bilateral LE. There is a full-thickness ulceration noted to the dorsal aspect of left foot, between metatarsal necks 1 and 2. Wound bed with extensive fibrotic tissue with small areas of infarcted tissue. There is mild amount of serous drainage noted. No purulence is appreciated with manual expression. After debridement, wound bed is fibrogranular and probes to periosteum of metatarsals 1 and 2. No malodor noted. Superficial thickness ulceration noted to distal aspect of left second digit; left second digit is mildly erythematous and edematous.   No drainage or malodor noted to superficial January 2020.   2. Abnormal density involving the first metatarsophalangeal joint, unchanged. 3. Degenerative change. 4. No definite evidence of fracture, bony destruction or osteomyelitis. .                   **This report has been created using voice recognition software. It may contain minor errors which are inherent in voice recognition technology. **       Final report electronically signed by DR Manuel Fritz on 1/10/2022 2:00 PM     CTA, abdominal aorta with bilateral runoff  Impression       1. Limited evaluation of the right leg secondary to patient motion artifact. 2. Relatively normal flow in the abdominal aorta, visceral arteries, right and left common, internal and external iliac arteries common femoral arteries. 3. There is an area of significant narrowing in the distal right superficial femoral artery. 4. There is relatively normal flow in the left common and superficial femoral, popliteal, anterior and posterior tibial arteries. 5. Intramedullary jaleesa in the right tibia-fibula. 6. Mild diffuse fatty replacement in the liver. 7. Punctate calcifications in spleen. 8. Small bilateral adrenal nodules. 9. Atrophic pancreas. Dilated pancreatic duct. 10. 2.2 cm fluid collection in the region of the splenic hilum. 11. Enlarged prostate gland.           **This report has been created using voice recognition software. It may contain minor errors which are inherent in voice recognition technology. **       Final report electronically signed by DR Manuel Fritz on 1/10/2022 9:10 PM         CULTURES:    LABS: Recent Labs     01/10/22  1430 01/11/22  0619   WBC 16.7* 15.6*   HGB 14.4 11.6*   HCT 40.0* 33.3*    188        Recent Labs     01/11/22  0619   *   K 3.2*   CL 94*   CO2 23   BUN 8   CREATININE 0.5        Recent Labs     01/10/22  1430 01/11/22  0619   PROT 7.3 5.4*        Recent Labs     01/10/22  1430   CKTOTAL 3,452*       Assessment: 63 y.o. male with:  Principle  Frostbite, right foot  Full-thickness ulceration, left foot    Chronic  Patient Active Problem List   Diagnosis    Incarcerated left inguinal hernia    Tobacco dependence    Substance induced mood disorder ([facility] [address]    Foot pain, bilateral       Plan  Frostbite, right foot  Full-thickness ulceration, left foot  -Patient initially examined and evaluated. -Reviewed radiographs; impression above  -WBC 15.6; patient afebrile  -CRP, 12.2; lactate sepsis 1.4  -Blood cultures 1 and 2, preliminary results: No growth  -Patient currently on IV vancomycin per primary; patient received   IV cefepime and IV Zosyn during admission on 1/10/2022  -Patient received nitroglycerin paste to bilateral feet on 1/10/2022 in the ED  -Applied Betadine wet-to-dry to distal right forefoot; wrapped loosely with Kerlix  -Performed bedside debridement of wound to dorsal aspect of left foot; patient did not receive any local anesthetic as patient has neuropathy at that level. Obtained small tissue culture which was sensitive to microbiology for aerobic and anaerobic cultures; also took wound swabs to send off for culture and sensitivity  -Ordered postop shoes; patient is to wear to bilateral feet for transfers and bathroom privileges only  -Patient okay to weight-bear as tolerated; encouraged patient to minimize weightbearing  -Discussed with patient that the prognosis for his right foot is very poor; tissue to the distal right forefoot appears severely necrotic; discussed with patient that we will have to ensure that the right foot does not convert into wet gangrene with Betadine wet-to-dry changes daily to encourage the tissue to try and self demarcate.  -Discussed with patient that the prognosis for the left foot is much better; however he does have a full-thickness wound probes to bone. Discussed with patient that if the wound probes to bone, the risk of bone infection increased.   We will consider getting an MRI of the left foot to see if there are any changes consistent with osteomyelitis.  -Discussed with patient that best option would likely be daily Betadine dressing changes to the right foot, to encourage tissue demarcation and eventual autoamputation. Discussed that we will continue with packing and Betadine to wound full-thickness ulceration on the dorsal aspect of the left forefoot with superficial thickness ulceration on the distal tip of the left second digit.  -Placed order for daily dressing changes with nitroglycerin paste to the dorsal aspect of the foot bilaterally, Betadine wet-to-dry to distal right forefoot and wound to dorsum of left forefoot wrapped with Kerlix rolls.  -Patient verbalized understanding and agreement with the treatment plan as stated. All of his questions were answered to satisfaction. Dr. Griffin Buck, thank you for the consultation, allowing podiatry to assist in the medical welfare of this patient. Podiatry will continue to follow this patient throughout the duration of hospitalization.      Sukhdev Martell DPM, PGY-2  Foot and Ankle Surgical Resident  1/11/2022 11:32 AM

## 2022-01-11 NOTE — PROGRESS NOTES
[address] [facility] Pharmacokinetic Monitoring Service - Vancomycin     Shaylee Montez is a 63 y.o. male starting on vancomycin therapy for SSTI. Pharmacy consulted by Dr Fazal Elam for monitoring and adjustment. Target Concentration: Goal trough of 10-15 mg/L and AUC/MIGUEL ANGEL <500 mg*hr/L    Additional Antimicrobials: Zosyn    Pertinent Laboratory Values: Wt Readings from Last 1 Encounters:   01/10/22 140 lb (63.5 kg)     Temp Readings from Last 1 Encounters:   01/10/22 98.3 °F (36.8 °C)     Estimated Creatinine Clearance: 119 mL/min (based on SCr of 0.6 mg/dL). Recent Labs     01/10/22  1430   CREATININE 0.6   WBC 16.7*     Procalcitonin: n/a    Pertinent Cultures:  Culture Date Source Results   1/10/22 Blood x 2    MRSA Nasal Swab: N/A. Non-respiratory infection. Plan:  1. Dosing recommendations based on Bayesian software  2. Start vancomycin 1000mg q12h  a. Anticipated AUC of 447 and trough concentration of 12.7 at steady state  3. Renal labs as indicated   4. Vancomycin concentration not ordered yet  5.  Pharmacy will continue to monitor patient and adjust therapy as indicated    Thank you for the consult,  Tawana Leon, [employer]  1/10/2022 9:39 PM

## 2022-01-12 LAB
ALBUMIN SERPL-MCNC: 2.2 G/DL (ref 3.5–5.1)
ALP BLD-CCNC: 41 U/L (ref 38–126)
ALT SERPL-CCNC: 36 U/L (ref 11–66)
ANION GAP SERPL CALCULATED.3IONS-SCNC: 9 MEQ/L (ref 8–16)
AST SERPL-CCNC: 71 U/L (ref 5–40)
BILIRUB SERPL-MCNC: 0.9 MG/DL (ref 0.3–1.2)
BUN BLDV-MCNC: 6 MG/DL (ref 7–22)
CALCIUM IONIZED: 1.1 MMOL/L (ref 1.12–1.32)
CALCIUM SERPL-MCNC: 8.1 MG/DL (ref 8.5–10.5)
CHLORIDE BLD-SCNC: 94 MEQ/L (ref 98–111)
CO2: 23 MEQ/L (ref 23–33)
CREAT SERPL-MCNC: 0.6 MG/DL (ref 0.4–1.2)
ERYTHROCYTE [DISTWIDTH] IN BLOOD BY AUTOMATED COUNT: 13.3 % (ref 11.5–14.5)
ERYTHROCYTE [DISTWIDTH] IN BLOOD BY AUTOMATED COUNT: 48.9 FL (ref 35–45)
GFR SERPL CREATININE-BSD FRML MDRD: > 90 ML/MIN/1.73M2
GLUCOSE BLD-MCNC: 98 MG/DL (ref 70–108)
HCT VFR BLD CALC: 30 % (ref 42–52)
HEMOGLOBIN: 10.6 GM/DL (ref 14–18)
INR BLD: 1.56 (ref 0.85–1.13)
MAGNESIUM: 1.5 MG/DL (ref 1.6–2.4)
MCH RBC QN AUTO: 35.1 PG (ref 26–33)
MCHC RBC AUTO-ENTMCNC: 35.3 GM/DL (ref 32.2–35.5)
MCV RBC AUTO: 99.3 FL (ref 80–94)
ORGANISM: ABNORMAL
PHOSPHORUS: 2.2 MG/DL (ref 2.4–4.7)
PLATELET # BLD: 195 THOU/MM3 (ref 130–400)
PMV BLD AUTO: 10.1 FL (ref 9.4–12.4)
POTASSIUM SERPL-SCNC: 3.2 MEQ/L (ref 3.5–5.2)
RBC # BLD: 3.02 MILL/MM3 (ref 4.7–6.1)
SODIUM BLD-SCNC: 126 MEQ/L (ref 135–145)
TOTAL CK: 1063 U/L (ref 55–170)
TOTAL PROTEIN: 5.4 G/DL (ref 6.1–8)
URINE CULTURE, ROUTINE: ABNORMAL
VANCOMYCIN RANDOM: 9.2 UG/ML (ref 0.1–39.9)
WBC # BLD: 13.4 THOU/MM3 (ref 4.8–10.8)

## 2022-01-12 PROCEDURE — 80053 COMPREHEN METABOLIC PANEL: CPT

## 2022-01-12 PROCEDURE — 6370000000 HC RX 637 (ALT 250 FOR IP): Performed by: PHYSICIAN ASSISTANT

## 2022-01-12 PROCEDURE — 2580000003 HC RX 258: Performed by: INTERNAL MEDICINE

## 2022-01-12 PROCEDURE — 6360000002 HC RX W HCPCS: Performed by: INTERNAL MEDICINE

## 2022-01-12 PROCEDURE — 36415 COLL VENOUS BLD VENIPUNCTURE: CPT

## 2022-01-12 PROCEDURE — 85027 COMPLETE CBC AUTOMATED: CPT

## 2022-01-12 PROCEDURE — 6370000000 HC RX 637 (ALT 250 FOR IP): Performed by: INTERNAL MEDICINE

## 2022-01-12 PROCEDURE — 82550 ASSAY OF CK (CPK): CPT

## 2022-01-12 PROCEDURE — 85610 PROTHROMBIN TIME: CPT

## 2022-01-12 PROCEDURE — 80202 ASSAY OF VANCOMYCIN: CPT

## 2022-01-12 PROCEDURE — 84100 ASSAY OF PHOSPHORUS: CPT

## 2022-01-12 PROCEDURE — 6360000002 HC RX W HCPCS: Performed by: STUDENT IN AN ORGANIZED HEALTH CARE EDUCATION/TRAINING PROGRAM

## 2022-01-12 PROCEDURE — 83735 ASSAY OF MAGNESIUM: CPT

## 2022-01-12 PROCEDURE — 1200000000 HC SEMI PRIVATE

## 2022-01-12 PROCEDURE — 82330 ASSAY OF CALCIUM: CPT

## 2022-01-12 PROCEDURE — P9047 ALBUMIN (HUMAN), 25%, 50ML: HCPCS | Performed by: STUDENT IN AN ORGANIZED HEALTH CARE EDUCATION/TRAINING PROGRAM

## 2022-01-12 PROCEDURE — 99233 SBSQ HOSP IP/OBS HIGH 50: CPT | Performed by: HOSPITALIST

## 2022-01-12 RX ORDER — HALOPERIDOL 5 MG/ML
1 INJECTION INTRAMUSCULAR EVERY 6 HOURS PRN
Status: DISCONTINUED | OUTPATIENT
Start: 2022-01-12 | End: 2022-01-24 | Stop reason: HOSPADM

## 2022-01-12 RX ORDER — SODIUM CHLORIDE 9 MG/ML
INJECTION, SOLUTION INTRAVENOUS CONTINUOUS
Status: DISCONTINUED | OUTPATIENT
Start: 2022-01-12 | End: 2022-01-23

## 2022-01-12 RX ORDER — ALBUMIN (HUMAN) 12.5 G/50ML
25 SOLUTION INTRAVENOUS ONCE
Status: COMPLETED | OUTPATIENT
Start: 2022-01-12 | End: 2022-01-12

## 2022-01-12 RX ORDER — ALBUMIN (HUMAN) 12.5 G/50ML
25 SOLUTION INTRAVENOUS EVERY 8 HOURS
Status: COMPLETED | OUTPATIENT
Start: 2022-01-13 | End: 2022-01-13

## 2022-01-12 RX ORDER — NICOTINE 21 MG/24HR
1 PATCH, TRANSDERMAL 24 HOURS TRANSDERMAL DAILY
Status: DISCONTINUED | OUTPATIENT
Start: 2022-01-12 | End: 2022-01-24 | Stop reason: HOSPADM

## 2022-01-12 RX ADMIN — ENOXAPARIN SODIUM 40 MG: 100 INJECTION SUBCUTANEOUS at 20:05

## 2022-01-12 RX ADMIN — HYDROCODONE BITARTRATE AND ACETAMINOPHEN 1 TABLET: 5; 325 TABLET ORAL at 02:40

## 2022-01-12 RX ADMIN — POTASSIUM CHLORIDE 40 MEQ: 1500 TABLET, EXTENDED RELEASE ORAL at 09:58

## 2022-01-12 RX ADMIN — Medication 100 MG: at 09:59

## 2022-01-12 RX ADMIN — Medication: at 04:41

## 2022-01-12 RX ADMIN — ACETAMINOPHEN 650 MG: 325 TABLET ORAL at 21:00

## 2022-01-12 RX ADMIN — ALBUMIN (HUMAN) 25 G: 0.25 INJECTION, SOLUTION INTRAVENOUS at 09:57

## 2022-01-12 RX ADMIN — VANCOMYCIN HYDROCHLORIDE 1000 MG: 1 INJECTION, POWDER, LYOPHILIZED, FOR SOLUTION INTRAVENOUS at 11:45

## 2022-01-12 RX ADMIN — PIPERACILLIN AND TAZOBACTAM 3375 MG: 3; .375 INJECTION, POWDER, LYOPHILIZED, FOR SOLUTION INTRAVENOUS at 20:27

## 2022-01-12 RX ADMIN — LORAZEPAM 2 MG: 2 INJECTION INTRAMUSCULAR; INTRAVENOUS at 02:39

## 2022-01-12 RX ADMIN — SODIUM CHLORIDE: 9 INJECTION, SOLUTION INTRAVENOUS at 20:25

## 2022-01-12 RX ADMIN — PIPERACILLIN AND TAZOBACTAM 3375 MG: 3; .375 INJECTION, POWDER, LYOPHILIZED, FOR SOLUTION INTRAVENOUS at 04:41

## 2022-01-12 RX ADMIN — Medication 1 TABLET: at 11:51

## 2022-01-12 RX ADMIN — VANCOMYCIN HYDROCHLORIDE 1000 MG: 1 INJECTION, POWDER, LYOPHILIZED, FOR SOLUTION INTRAVENOUS at 22:41

## 2022-01-12 RX ADMIN — HYDROCODONE BITARTRATE AND ACETAMINOPHEN 1 TABLET: 5; 325 TABLET ORAL at 18:07

## 2022-01-12 RX ADMIN — PIPERACILLIN AND TAZOBACTAM 3375 MG: 3; .375 INJECTION, POWDER, LYOPHILIZED, FOR SOLUTION INTRAVENOUS at 13:53

## 2022-01-12 ASSESSMENT — PAIN SCALES - GENERAL
PAINLEVEL_OUTOF10: 0
PAINLEVEL_OUTOF10: 0
PAINLEVEL_OUTOF10: 6
PAINLEVEL_OUTOF10: 6
PAINLEVEL_OUTOF10: 0
PAINLEVEL_OUTOF10: 0
PAINLEVEL_OUTOF10: 4
PAINLEVEL_OUTOF10: 3

## 2022-01-12 ASSESSMENT — PAIN DESCRIPTION - PAIN TYPE
TYPE: CHRONIC PAIN
TYPE: CHRONIC PAIN

## 2022-01-12 ASSESSMENT — PAIN DESCRIPTION - ORIENTATION: ORIENTATION: LOWER

## 2022-01-12 ASSESSMENT — PAIN DESCRIPTION - LOCATION: LOCATION: BACK

## 2022-01-12 NOTE — PROGRESS NOTES
Arrived to patient room to complete AOD consult. Spoke with Leandra Thompson and another staff member. Pt is not appropriate at this time. GREGORIO to follow-up tomorrow.

## 2022-01-12 NOTE — PROGRESS NOTES
Pt's bed alarm ringing and went into room Pt had gotten up by himself and fell at 0850. Pt denies any injuries, however, skin tear noted to rt hand applied dressing. Assisted pt back to bed, Charge RN and Manager at bedside assisting pt back to bed as well. Notified Dr. Anat Otto new order rcvd for sitter. 0900 Sitter in to sit at bedside. Pt had pulled his iv out prior to getting up out of bed, attempted x 2 to restart with no success. Called iv team and they will come up to try. 1230 Pt up to bedside commode with assistance x 1 and voids without difficulty. Pt has been resting quietly in bed, he has been refusing breakfast and lunch. He had albumin iv x 1 this am and has NS at 75cc/hr infusing new iv site. 1730 Pt sitting up in bed having small amount of dinner. Encouraged to drink supplemental drink, he declines. Pt is alert and oriented x 3. He is following commands and does not appear anxious or agitated at present time. 200 Dr in to change dressing to bilateral feet. Pt c/o pain and med with Norco one tab. Sitter still present at bedside.

## 2022-01-12 NOTE — PROGRESS NOTES
Comprehensive Nutrition Assessment    Type and Reason for Visit:  Initial,Wound,Positive Nutrition Screen    Nutrition Recommendations/Plan:   Initiate ONS: Ensure Enlive TID and Percy BID  Consider current diet and vitamin supplementation     Nutrition Assessment:    Pt. nutritionally compromised AEB foot wounds. At risk for further nutrition compromise r/t increased nutrient needs for wound healing, underlying medical condition (hx alcohol and cocaine abuse). Nutrition recommendations/interventions as per above. Malnutrition Assessment:  Malnutrition Status:  Insufficient data (unable to assess- patient reports \"catching up on sleep\")      Estimated Daily Nutrient Needs:  Energy (kcal):  1034-5993 kcals (30-35); Weight Used for Energy Requirements:   (63.5kgm on 1/10)     Protein (g):   grams (1.5-2); Weight Used for Protein Requirements:   (63.5kgm on 1/10)            Nutrition Related Findings:     RN reports patient refused breakfast and lunch today, did take morning pills, patient seen covered in blankets, writer introduced self and patients responds \"cathching up on sleep\" otherwise did not respond, note report of homeless- stays with friends at times, reports drinking six beers per day; BM x1 on 1/11  Rx includes MVI, thiamine, zosyn, vancomycin, electrolyte replacement   Sodium 126, Potassium 3.2, BUN 6, Creatinine 0.6, Glucose 98, Phosphorus 2.2     Wounds:   (right foot: frostbite; left foot: full thickness ulceration)       Current Nutrition Therapies:    ADULT DIET; Regular  ADULT ORAL NUTRITION SUPPLEMENT; Breakfast, Lunch, Dinner; Standard High Calorie/High Protein Oral Supplement  ADULT ORAL NUTRITION SUPPLEMENT; Breakfast, Dinner;  Wound Healing Oral Supplement    Anthropometric Measures:  · Height: 6' (182.9 cm)  · Current Body Weight: 140 lb (63.5 kg)   · Admission Body Weight: 140 lb (63.5 kg) (1/10; +2,+3 LE edema)    · Usual Body Weight:  (7/6/21 stated 150# per EMR)     · Ideal Body Weight: 178 lbs;   · BMI: 19  · BMI Categories: Normal Weight (BMI 18.5-24. 9)       Nutrition Diagnosis:   · Increased nutrient needs related to increase demand for energy/nutrients as evidenced by wounds      Nutrition Interventions:   Food and/or Nutrient Delivery:  Continue Current Diet,Start Oral Nutrition Supplement  Nutrition Education/Counseling:  Education not appropriate   Coordination of Nutrition Care:  Continue to monitor while inpatient    Goals:  Patient will consume 75% or more of meals to aid in wound healing during LOS       Nutrition Monitoring and Evaluation:   Behavioral-Environmental Outcomes:  None Identified   Food/Nutrient Intake Outcomes:  Food and Nutrient Intake,Supplement Intake  Physical Signs/Symptoms Outcomes:  Biochemical Data,Fluid Status or Edema,Meal Time Behavior,Nutrition Focused Physical Findings,Skin,Weight,GI Status     Discharge Planning:     Too soon to determine     Electronically signed by Eva Snell RD, LD on 1/12/22 at 3:15 PM EST    Contact: [phone]

## 2022-01-12 NOTE — PROGRESS NOTES
Physician Progress Note      PATIENT:               Hadley Ceja  CSN #:                  [id number]  :                       1960  ADMIT DATE:       1/10/2022 12:19 PM  [facility] DATE:  RESPONDING  PROVIDER #:        Kirby Brody MD          QUERY TEXT:    Patient admitted with frostbite to toes bilaterally and per podiatry consult   note, documentation of debridement. To accurately reflect the procedure   performed please document if debridement was excisional or nonexcisional and   the deepest depth of tissue removed as down to and including:  Options provided:  -- Nonexcisional debridement of skin  -- Excisional debridement of skin  -- Nonexcisional debridement of subcutaneous tissue  -- Excisional debridement of subcutaneous tissue  -- Nonexcisional debridement of fascia  -- Excisional debridement of fascia  -- Nonexcisional debridement of muscle  -- Excisional debridement of muscle  -- Other - I will add my own diagnosis  -- Disagree - Not applicable / Not valid  -- Disagree - Clinically unable to determine / Unknown  -- Refer to Clinical Documentation Reviewer    PROVIDER RESPONSE TEXT:    Excisional debridement of muscle of Left dorsal foot was performed during   procedure on .     Query created by: Elisa Garcia on 2022 10:42 AM      Electronically signed by:  Kirby Brody MD 2022 11:13 AM

## 2022-01-12 NOTE — PROGRESS NOTES
[address] [facility] Pharmacokinetic Monitoring Service - Vancomycin    Consulting Provider: Dr. Vicki Gonzalez   Indication: SSTI  Target Concentration: Goal trough of 10-15 mg/L and AUC/MIGUEL ANGEL <500 mg*hr/L  Day of Therapy: 3  Additional Antimicrobials: zosyn    Pertinent Laboratory Values: Wt Readings from Last 1 Encounters:   01/10/22 140 lb (63.5 kg)     Temp Readings from Last 1 Encounters:   01/11/22 99.1 °F (37.3 °C) (Oral)     Estimated Creatinine Clearance: 119 mL/min (based on SCr of 0.6 mg/dL). Recent Labs     01/11/22  0619 01/12/22  0545   CREATININE 0.5 0.6   WBC 15.6* 13.4*       Pertinent Cultures:  Culture Date Source Results   1/10/22 Blood cx X2 ngtd   1/11/22 Tissue cx Coag + staph   1/11/22 Tissue cx Non-fermenting gram negative bacilli   MRSA Nasal Swab: N/A. Non-respiratory infection.     Recent vancomycin administrations                     vancomycin 1000 mg IVPB in 250 mL D5W addavial (mg) 1,000 mg New Bag 01/11/22 1858     1,000 mg New Bag  0857    vancomycin (VANCOCIN) 1,500 mg in dextrose 5 % 500 mL IVPB (mg) 1,500 mg New Bag 01/10/22 1945            Assessment:  Date/Time Current Dose Concentration Timing of Concentration (h) AUC   1/12/22 1,000 mg IV q12h 9.2 ~11 hours after dose 413   Note: Serum concentrations collected for AUC dosing may appear elevated if collected in close proximity to the dose administered, this is not necessarily an indication of toxicity    Plan:  Current dosing regimen is therapeutic  Continue current dose  Repeat vancomycin concentration in 1 to 2 days to improve accuracy of AUC calculations  Pharmacy will continue to monitor patient and adjust therapy as indicated    Thank you for the consult,  Jessica Damico, [address] [facility]  1/12/2022 7:40 AM

## 2022-01-12 NOTE — PROGRESS NOTES
HOSPITALIST PROGRESS NOTE    Patient:  Antonella Garcia    Unit/Bed:7K-27/027-A  YOB: 1960  MRN: [id number]   PCP: No primary care provider on file. Date of Admission: 1/10/2022  Date of Service: 1/12/2022  Chief Complaint:- Foot pain    Assessment and Plan:  1. Bilateral LE Ulcers/Eschar/?Frostbite on RLE: Patient presents with what appears to be frostbite of the right foot as well as a full-thickness ulceration of the left foot. Patient is afebrile however leukocytosis was present on arrival.  All digits of the right foot appear necrotic, foul-smelling and gangrenous which extends to the metatarsal level. The left foot has an open ulcer on the dorsal aspect near the second digit however does not appear to be necrotic appearing as the right foot. CTA of the abdominal aorta with bilateral runoff shows an area of significant narrowing in the distal right superficial femoral artery with what appears to be relatively normal flow in the left LE circulation. Podiatry consulted. · Excisional debridement of muscle of left dorsal foot performed on 1/11/2022 per podiatry  · Albumin 25 g to be given x1 today  · Continue with broad-spectrum antibiotics with Zosyn and Vancocin started 1/10/2022. Wound cultures and blood cultures pending. 2. History of EtOH Abuse: Patient with increased agitation, pulling IV lines and refusing therapy. Will add Haldol PRN. Bedside sitter. 3. Mild Rhabdomyolysis: Downtrending. Continue with IV NS at current rate. 4. Alcoholic Transaminitis on Diffuse Fatty Liver: Mild transaminitis with AST > ALT. Both now downtrending, indicating recovery from EtOH toxic insult. US liver with doppler study ordered to assess for cirrhosis. 5. Moderate Acute on Chronic Asymptomatic Euvolemic Hyponatremia: Na baseline appears to be around 132. Mild decrease from baseline, current Na 126. Urine studies show SIADH component. · Discontinue IV NS.  Will start fluid restriction of < 1.5 L per day  · Repeat BMP in AM  6. Mild Hyperkalemia: Etiology likely due to poor solute intake along with renal wasting due to hypomagnesemia. Replacement protocols in place for both. 7. Disposition: Patient at high risk of right metatarsal amputation given clinical picture. Social work and addiction services consulted due to patient being homeless and history of drug abuse. Subjective (Past 24 hour events):  Patient required Ativan overnight due to agitation per RN staff. Patient sleeping on encounter, difficult to arouse. RN staff reports that patient has continued agitation throughout the day. Discussed adding Haldol PRN as well as placing a bedside sitter. HPI:  82-year-old homeless male with PMH of EtOH and cocaine abuse who came to the ED on 1/10/2022 due to bilateral lower extremity pain and numbness. On arrival patient was noted to be an extremely poor historian. Patient did state that he has had lower extremity numbness and pain for about 2 weeks. Not sure how long he has had ulcers in the foot. Admits to drinking every day, about 6 drinks per day for several years. Denied any chronic medical problems. Physical exam in the ED showed bilateral lower extremity eschars along with ulcers and discolored toes. Admission labs showed multiple significant abnormalities which included hyponatremia, hypokalemia, hyperbilirubinemia leukocytosis, macrocytic anemia, rhabdomyolysis. Patient admitted for these bilateral ulcers/eschar and multiple lab abnormalities. Please see Assessment and Plan for further details on hospital course.     Scheduled Meds:   nicotine  1 patch TransDERmal Daily    phosphorus replacement protocol   Other RX Placeholder    calcium replacement protocol   Other RX Placeholder    piperacillin-tazobactam  3,375 mg IntraVENous Q8H    sodium chloride flush  5-40 mL IntraVENous 2 times per day    enoxaparin  40 mg SubCUTAneous Q24H    thiamine  100 mg Oral Daily    multivitamin 1 tablet Oral Daily    vancomycin (VANCOCIN) intermittent dosing (placeholder)   Other RX Placeholder    vancomycin  1,000 mg IntraVENous Q12H     Continuous Infusions:   sodium chloride      sodium chloride 75 mL/hr at 01/11/22 1154       PHYSICAL EXAMINATION:  Blood pressure (!) 140/78, pulse 96, temperature 99.1 °F (37.3 °C), temperature source Oral, resp. rate 16, height 6' (1.829 m), weight 140 lb (63.5 kg), SpO2 96 %. Oxygen Delivery -    General: Acute on chronic ill appearing. Appears disheveled. In no acute distress. HEENT:  normocephalic and atraumatic. No scleral icterus. PERR  Neck: supple. No Thyromegaly. Lungs: clear to auscultation. No retractions  Cardiac: RRR. No JVD. Abdomen: soft. Nontender. Extremities: Open ulceration on the dorsum of the left foot. Right foot from metatarsals to toes are covered in Ace wraps soaked in Betadine. See below for appearance of feet prior to bandaging/wound care. Vasculature: delayed capillary refill. Skin: cool and dry in the LE b/l  Psych:  Alert and oriented x3. Lymph:  No supraclavicular adenopathy. Neurologic:  No focal deficit. No seizures            Diagnostic Data: (All radiographs, EKGs, PFTs, and imaging are personally viewed and interpreted unless otherwise noted). CBC:   Recent Labs     01/10/22  1430 01/11/22  0619   WBC 16.7* 15.6*   HGB 14.4 11.6*    188     BMP:    Recent Labs     01/10/22  1430 01/11/22  0619   * 128*   K 3.0* 3.2*   CL 88* 94*   CO2 27 23   BUN 9 8   CREATININE 0.6 0.5   GLUCOSE 130* 103     Ionized Calcium: No results for input(s): IONCA in the last 72 hours. Magnesium:   Recent Labs     01/11/22  0619   MG 1.6     Phosphorus: No results for input(s): PHOS in the last 72 hours.   Hepatic:   Recent Labs     01/10/22  1430 01/11/22  0619   * 93*   ALT 52 41   BILITOT 1.5* 1.3*   ALKPHOS 60 49     Vitamin B12: No components found for: B12  Folate:   Lab Results   Component Value Date FOLATE 9.8 01/10/2022     IRON: No results found for: IRON  Iron Saturation: No components found for: PERCENTFE  TIBC: No results found for: TIBC  Ferritin: No results found for: FERRITIN  Troponin:   Lab Results   Component Value Date    TROPONINT < 0.010 07/06/2021    TROPONINT < 0.010 09/01/2014     BNP: No results found for: BNP  Lipids: No results found for: LDL  INR: No results found for: INR  ABG: No results found for: PH, PCO2, PO2, HCO3, O2SAT  TSH:   Lab Results   Component Value Date    TSH 1.650 06/27/2017     Sed Rate: No results found for: SEDRATE  CRP:   Lab Results   Component Value Date    CRP 12.22 01/10/2022     UA:   Recent Labs     01/10/22  1945   [address] see below   PHUR see below   [place] see below   BLOODU see below   [address]   [address]   [place] NONE SEEN   [place] see below   [address] see below   [place] see below   LABCAST 0-4 HYALINE  NONE SEEN     Micro:   Lab Results   Component Value Date    BC No growth-preliminary  01/10/2022     Imaging Quick Reads from Previous 7 Days:  XR FOOT LEFT (MIN 3 VIEWS)    Result Date: 1/10/2022   1. Stable left foot radiographs, no interval change since previous study dated 30 January 2020. 2. Abnormal density involving the first metatarsophalangeal joint, unchanged. 3. Degenerative change. 4. No definite evidence of fracture, bony destruction or osteomyelitis. . **This report has been created using voice recognition software. It may contain minor errors which are inherent in voice recognition technology. ** Final report electronically signed by DR Keiko Le on 1/10/2022 2:00 PM    XR FOOT RIGHT (MIN 3 VIEWS)    Result Date: 1/10/2022   1. Postoperative changes in the distal tibia. 2. Abnormal density involving the neck of the fifth metacarpal. 3. Degenerative changes. 4. Diffuse osteopenia. 5. Soft tissue swelling over the dorsum of foot. . **This report has been created using voice recognition software. It may contain minor errors which are inherent in voice recognition technology. ** Final report electronically signed by DR Evie Ferreira on 1/10/2022 2:03 PM    CTA ABDOMINAL AORTA W BILAT RUNOFF W WO CONTRAST    Result Date: 1/10/2022  1. Limited evaluation of the right leg secondary to patient motion artifact. 2. Relatively normal flow in the abdominal aorta, visceral arteries, right and left common, internal and external iliac arteries common femoral arteries. 3. There is an area of significant narrowing in the distal right superficial femoral artery. 4. There is relatively normal flow in the left common and superficial femoral, popliteal, anterior and posterior tibial arteries. 5. Intramedullary jaleesa in the right tibia-fibula. 6. Mild diffuse fatty replacement in the liver. 7. Punctate calcifications in spleen. 8. Small bilateral adrenal nodules. 9. Atrophic pancreas. Dilated pancreatic duct. 10. 2.2 cm fluid collection in the region of the splenic hilum. 11. Enlarged prostate gland. **This report has been created using voice recognition software. It may contain minor errors which are inherent in voice recognition technology. ** Final report electronically signed by DR Evie Ferreira on 1/10/2022 9:10 PM    Electronically signed by Lisa Sr.  Rowan Menard M.D. on 1/12/2022  PGY-2 Internal Medicine Resident

## 2022-01-13 ENCOUNTER — APPOINTMENT (OUTPATIENT)
Dept: MRI IMAGING | Age: 62
End: 2022-01-13
Payer: COMMERCIAL

## 2022-01-13 ENCOUNTER — APPOINTMENT (OUTPATIENT)
Dept: ULTRASOUND IMAGING | Age: 62
End: 2022-01-13
Payer: COMMERCIAL

## 2022-01-13 LAB
ALBUMIN SERPL-MCNC: 2.4 G/DL (ref 3.5–5.1)
ALP BLD-CCNC: 42 U/L (ref 38–126)
ALT SERPL-CCNC: 38 U/L (ref 11–66)
ANION GAP SERPL CALCULATED.3IONS-SCNC: 11 MEQ/L (ref 8–16)
AST SERPL-CCNC: 66 U/L (ref 5–40)
BILIRUB SERPL-MCNC: 0.8 MG/DL (ref 0.3–1.2)
BUN BLDV-MCNC: 5 MG/DL (ref 7–22)
CALCIUM SERPL-MCNC: 8.2 MG/DL (ref 8.5–10.5)
CHLORIDE BLD-SCNC: 96 MEQ/L (ref 98–111)
CO2: 22 MEQ/L (ref 23–33)
CREAT SERPL-MCNC: 0.6 MG/DL (ref 0.4–1.2)
ERYTHROCYTE [DISTWIDTH] IN BLOOD BY AUTOMATED COUNT: 13.5 % (ref 11.5–14.5)
ERYTHROCYTE [DISTWIDTH] IN BLOOD BY AUTOMATED COUNT: 50.1 FL (ref 35–45)
GFR SERPL CREATININE-BSD FRML MDRD: > 90 ML/MIN/1.73M2
GLUCOSE BLD-MCNC: 102 MG/DL (ref 70–108)
HCT VFR BLD CALC: 30.4 % (ref 42–52)
HEMOGLOBIN: 10.6 GM/DL (ref 14–18)
MAGNESIUM: 1.6 MG/DL (ref 1.6–2.4)
MCH RBC QN AUTO: 35.1 PG (ref 26–33)
MCHC RBC AUTO-ENTMCNC: 34.9 GM/DL (ref 32.2–35.5)
MCV RBC AUTO: 100.7 FL (ref 80–94)
PLATELET # BLD: 200 THOU/MM3 (ref 130–400)
PMV BLD AUTO: 9.9 FL (ref 9.4–12.4)
POTASSIUM SERPL-SCNC: 3.1 MEQ/L (ref 3.5–5.2)
RBC # BLD: 3.02 MILL/MM3 (ref 4.7–6.1)
SODIUM BLD-SCNC: 129 MEQ/L (ref 135–145)
TOTAL PROTEIN: 5.7 G/DL (ref 6.1–8)
WBC # BLD: 9.3 THOU/MM3 (ref 4.8–10.8)

## 2022-01-13 PROCEDURE — P9047 ALBUMIN (HUMAN), 25%, 50ML: HCPCS | Performed by: HOSPITALIST

## 2022-01-13 PROCEDURE — 2580000003 HC RX 258: Performed by: INTERNAL MEDICINE

## 2022-01-13 PROCEDURE — 2580000003 HC RX 258: Performed by: STUDENT IN AN ORGANIZED HEALTH CARE EDUCATION/TRAINING PROGRAM

## 2022-01-13 PROCEDURE — 6360000002 HC RX W HCPCS: Performed by: STUDENT IN AN ORGANIZED HEALTH CARE EDUCATION/TRAINING PROGRAM

## 2022-01-13 PROCEDURE — 1200000000 HC SEMI PRIVATE

## 2022-01-13 PROCEDURE — 93975 VASCULAR STUDY: CPT

## 2022-01-13 PROCEDURE — 80053 COMPREHEN METABOLIC PANEL: CPT

## 2022-01-13 PROCEDURE — 6360000002 HC RX W HCPCS: Performed by: HOSPITALIST

## 2022-01-13 PROCEDURE — 83735 ASSAY OF MAGNESIUM: CPT

## 2022-01-13 PROCEDURE — 6370000000 HC RX 637 (ALT 250 FOR IP): Performed by: PHYSICIAN ASSISTANT

## 2022-01-13 PROCEDURE — 6370000000 HC RX 637 (ALT 250 FOR IP): Performed by: INTERNAL MEDICINE

## 2022-01-13 PROCEDURE — 85027 COMPLETE CBC AUTOMATED: CPT

## 2022-01-13 PROCEDURE — 6360000002 HC RX W HCPCS: Performed by: INTERNAL MEDICINE

## 2022-01-13 PROCEDURE — 36415 COLL VENOUS BLD VENIPUNCTURE: CPT

## 2022-01-13 PROCEDURE — 2580000003 HC RX 258: Performed by: HOSPITALIST

## 2022-01-13 PROCEDURE — 73718 MRI LOWER EXTREMITY W/O DYE: CPT

## 2022-01-13 PROCEDURE — 76705 ECHO EXAM OF ABDOMEN: CPT

## 2022-01-13 PROCEDURE — 99233 SBSQ HOSP IP/OBS HIGH 50: CPT | Performed by: HOSPITALIST

## 2022-01-13 PROCEDURE — 82043 UR ALBUMIN QUANTITATIVE: CPT

## 2022-01-13 RX ORDER — CIPROFLOXACIN 2 MG/ML
400 INJECTION, SOLUTION INTRAVENOUS EVERY 12 HOURS
Status: DISCONTINUED | OUTPATIENT
Start: 2022-01-13 | End: 2022-01-13 | Stop reason: ALTCHOICE

## 2022-01-13 RX ADMIN — Medication 1 TABLET: at 08:51

## 2022-01-13 RX ADMIN — PIPERACILLIN AND TAZOBACTAM 3375 MG: 3; .375 INJECTION, POWDER, LYOPHILIZED, FOR SOLUTION INTRAVENOUS at 06:13

## 2022-01-13 RX ADMIN — HYDROCODONE BITARTRATE AND ACETAMINOPHEN 1 TABLET: 5; 325 TABLET ORAL at 00:09

## 2022-01-13 RX ADMIN — POTASSIUM CHLORIDE 40 MEQ: 1500 TABLET, EXTENDED RELEASE ORAL at 18:15

## 2022-01-13 RX ADMIN — HYDROCODONE BITARTRATE AND ACETAMINOPHEN 1 TABLET: 5; 325 TABLET ORAL at 06:11

## 2022-01-13 RX ADMIN — PIPERACILLIN AND TAZOBACTAM 3375 MG: 3; .375 INJECTION, POWDER, LYOPHILIZED, FOR SOLUTION INTRAVENOUS at 23:34

## 2022-01-13 RX ADMIN — HYDROCODONE BITARTRATE AND ACETAMINOPHEN 1 TABLET: 5; 325 TABLET ORAL at 19:03

## 2022-01-13 RX ADMIN — PIPERACILLIN AND TAZOBACTAM 3375 MG: 3; .375 INJECTION, POWDER, LYOPHILIZED, FOR SOLUTION INTRAVENOUS at 16:25

## 2022-01-13 RX ADMIN — MAGNESIUM SULFATE HEPTAHYDRATE 2000 MG: 40 INJECTION, SOLUTION INTRAVENOUS at 08:51

## 2022-01-13 RX ADMIN — ENOXAPARIN SODIUM 40 MG: 100 INJECTION SUBCUTANEOUS at 23:18

## 2022-01-13 RX ADMIN — SODIUM CHLORIDE: 9 INJECTION, SOLUTION INTRAVENOUS at 16:23

## 2022-01-13 RX ADMIN — ALBUMIN (HUMAN) 25 G: 0.25 INJECTION, SOLUTION INTRAVENOUS at 05:25

## 2022-01-13 RX ADMIN — ONDANSETRON 4 MG: 2 INJECTION INTRAMUSCULAR; INTRAVENOUS at 23:24

## 2022-01-13 RX ADMIN — ALBUMIN (HUMAN) 25 G: 0.25 INJECTION, SOLUTION INTRAVENOUS at 13:36

## 2022-01-13 RX ADMIN — Medication 100 MG: at 08:51

## 2022-01-13 ASSESSMENT — PAIN DESCRIPTION - LOCATION
LOCATION: BACK

## 2022-01-13 ASSESSMENT — PAIN DESCRIPTION - PAIN TYPE
TYPE: CHRONIC PAIN

## 2022-01-13 ASSESSMENT — PAIN SCALES - GENERAL
PAINLEVEL_OUTOF10: 6
PAINLEVEL_OUTOF10: 2
PAINLEVEL_OUTOF10: 2
PAINLEVEL_OUTOF10: 6

## 2022-01-13 ASSESSMENT — PAIN DESCRIPTION - ONSET: ONSET: ON-GOING

## 2022-01-13 ASSESSMENT — PAIN DESCRIPTION - DESCRIPTORS: DESCRIPTORS: ACHING

## 2022-01-13 ASSESSMENT — PAIN DESCRIPTION - PROGRESSION: CLINICAL_PROGRESSION: NOT CHANGED

## 2022-01-13 ASSESSMENT — PAIN - FUNCTIONAL ASSESSMENT: PAIN_FUNCTIONAL_ASSESSMENT: PREVENTS OR INTERFERES SOME ACTIVE ACTIVITIES AND ADLS

## 2022-01-13 ASSESSMENT — PAIN DESCRIPTION - ORIENTATION
ORIENTATION: LOWER
ORIENTATION: MID

## 2022-01-13 ASSESSMENT — PAIN DESCRIPTION - FREQUENCY: FREQUENCY: CONTINUOUS

## 2022-01-13 ASSESSMENT — PAIN DESCRIPTION - DIRECTION: RADIATING_TOWARDS: ABDOMEN

## 2022-01-13 NOTE — FLOWSHEET NOTE
01/13/22 4346   Encounter Summary   Services provided to: Patient   Referral/Consult From: Rounding   Support System Unknown   Place of [facility] No [facility]. Continue Visiting No  (1/13, no as he declined offer, prayers or follow up.)   Complexity of Encounter Low   Length of Encounter 15 minutes   Spiritual/Latter-day   Type Spiritual support      attempted to visit with patient who declined need for a visit, declined offer of prayers. Staff left a \"Footprints in the Sand\" prayer card on his bedside table. Spiritual care remains available.

## 2022-01-13 NOTE — PROGRESS NOTES
HOSPITALIST PROGRESS NOTE    Patient:  Catracho Oconnor    Unit/Bed:7K-27/027-A  YOB: 1960  MRN: [id number]   PCP: No primary care provider on file. Date of Admission: 1/10/2022  Date of Service: 1/13/2022  Chief Complaint:- Foot pain    Assessment and Plan:  1. Bilateral LE Ulcers/Eschar/?Frostbite on RLE: Patient presents with what appears to be frostbite of the right foot as well as a full-thickness ulceration of the left foot. Patient is afebrile however leukocytosis was present on arrival.  All digits of the right foot appear necrotic, foul-smelling and gangrenous which extends to the metatarsal level. The left foot has an open ulcer on the dorsal aspect near the second digit however does not appear to be necrotic appearing as the right foot. CTA of the abdominal aorta with bilateral runoff shows an area of significant narrowing in the distal right superficial femoral artery with what appears to be relatively normal flow in the left LE circulation. Podiatry consulted. · MRI of the left foot done today 1/13/2022 reveals what appears to be osteomyelitis in the first digit and metatarsal. Soft tissue gas was also noted. Agree with arterial VL duplex ultrasound of the LE bilaterally for better flow assessment for wound healing. Probable TMA by podiatry. Will reach out to IR/Interventional Cards for possible revascularization  · Continue with broad-spectrum antibiotics with Zosyn started 1/10/2022. Wound cultures show growth of MSSA - Vancocin discontinued. 2. History of EtOH Abuse: Patient with increased agitation, pulling IV lines and refusing therapy. Will add Haldol PRN. Bedside sitter. 3. Mild Rhabdomyolysis: Downtrending. Continue with IV NS at current rate. 4. Diffuse Alcoholic Fatty Liver Disease without Cirrhosis: Evidence for acute EtOH toxic insult due to mild transaminitis with AST > ALT. Both now downtrending, indicating recovery from EtOH toxic insult. INR 1.56.   Platelet count greater than 150,000. Ultrasound of the liver shows fatty infiltration with concurrent hypoechogenicity, normal color flow via Doppler studies. No CBD dilatation. Patient at risk for developing fibrotic changes with continued drinking and eventual cirrhosis. 5. Severe Hypoalbuminemia: Due to severe malnutrition and chronic EtOH use. · IV albumin x3 doses given on 1/12-1/13/2022  6. Moderate Acute on Chronic Asymptomatic Hypovolemic Hyponatremia: Na baseline appears to be around 132. Mild decrease from baseline, current Na 126. Urine Na > 20, Urine osm > 100. · Continue with IV NS.  · Repeat BMP in AM  7. Mild Hyperkalemia: Etiology likely due to poor solute intake along with renal wasting due to hypomagnesemia. Replacement protocols in place for both. 8. Disposition: Osteomyelitis seen on MRI of the left foot. Subjective (Past 24 hour events):  No acute events overnight. Remains lethargic/sleepy. Has no complaints. HPI:  42-year-old homeless male with PMH of EtOH and cocaine abuse who came to the ED on 1/10/2022 due to bilateral lower extremity pain and numbness. On arrival patient was noted to be an extremely poor historian. Patient did state that he has had lower extremity numbness and pain for about 2 weeks. Not sure how long he has had ulcers in the foot. Admits to drinking every day, about 6 drinks per day for several years. Denied any chronic medical problems. Physical exam in the ED showed bilateral lower extremity eschars along with ulcers and discolored toes. Admission labs showed multiple significant abnormalities which included hyponatremia, hypokalemia, hyperbilirubinemia leukocytosis, macrocytic anemia, rhabdomyolysis. Patient admitted for these bilateral ulcers/eschar and multiple lab abnormalities. Please see Assessment and Plan for further details on hospital course.     Scheduled Meds:   nicotine  1 patch TransDERmal Daily    phosphorus replacement protocol   Other RX Placeholder    calcium replacement protocol   Other RX Placeholder    piperacillin-tazobactam  3,375 mg IntraVENous Q8H    sodium chloride flush  5-40 mL IntraVENous 2 times per day    enoxaparin  40 mg SubCUTAneous Q24H    thiamine  100 mg Oral Daily    multivitamin  1 tablet Oral Daily     Continuous Infusions:   sodium chloride 75 mL/hr at 01/13/22 1623    sodium chloride         PHYSICAL EXAMINATION:  Blood pressure (!) 97/46, pulse 98, temperature 99.6 °F (37.6 °C), temperature source Oral, resp. rate 16, height 6' (1.829 m), weight 140 lb (63.5 kg), SpO2 96 %. Oxygen Delivery -    General: Acute on chronic ill appearing. Appears disheveled. In no acute distress. HEENT:  normocephalic and atraumatic. No scleral icterus. PERR  Neck: supple. No Thyromegaly. Lungs: clear to auscultation. No retractions  Cardiac: RRR. No JVD. Abdomen: soft. Nontender. Extremities: Open ulceration on the dorsum of the left foot. Right foot from metatarsals to toes are covered in Ace wraps soaked in Betadine. See below for appearance of feet prior to bandaging/wound care. Vasculature: delayed capillary refill. Skin: cool and dry in the LE b/l  Psych:  Alert and oriented x3. Lymph:  No supraclavicular adenopathy. Neurologic:  No focal deficit. No seizures            Diagnostic Data: (All radiographs, EKGs, PFTs, and imaging are personally viewed and interpreted unless otherwise noted). CBC:   Recent Labs     01/11/22  0619 01/12/22  0545 01/13/22  0535   WBC 15.6* 13.4* 9.3   HGB 11.6* 10.6* 10.6*    195 200     BMP:    Recent Labs     01/11/22  0619 01/12/22  0545 01/13/22  0535   * 126* 129*   K 3.2* 3.2* 3.1*   CL 94* 94* 96*   CO2 23 23 22*   BUN 8 6* 5*   CREATININE 0.5 0.6 0.6   GLUCOSE 103 98 102     Ionized Calcium: No results for input(s): IONCA in the last 72 hours.   Magnesium:   Recent Labs     01/13/22  0535   MG 1.6     Phosphorus:   Recent Labs     01/12/22  0545   PHOS 2.2* Hepatic:   Recent Labs     01/11/22  0619 01/12/22  0545 01/13/22  0535   AST 93* 71* 66*   ALT 41 36 38   BILITOT 1.3* 0.9 0.8   ALKPHOS 49 41 42     Vitamin B12: No components found for: B12  Folate:   Lab Results   Component Value Date    FOLATE 9.8 01/10/2022     IRON: No results found for: IRON  Iron Saturation: No components found for: PERCENTFE  TIBC: No results found for: TIBC  Ferritin: No results found for: FERRITIN  Troponin:   Lab Results   Component Value Date    TROPONINT < 0.010 07/06/2021    TROPONINT < 0.010 09/01/2014     BNP: No results found for: BNP  Lipids: No results found for: LDL  INR:   Lab Results   Component Value Date    INR 1.56 01/12/2022     ABG: No results found for: PH, PCO2, PO2, HCO3, O2SAT  TSH:   Lab Results   Component Value Date    TSH 1.650 06/27/2017     Sed Rate: No results found for: SEDRATE  CRP:   Lab Results   Component Value Date    CRP 12.22 01/10/2022     UA:   Recent Labs     01/10/22  1945   [address] see below   [place]UR see below   [place] see below   BLOODU see below   [address] [facility] 3-5   [address] see below   [address] see below   [facility] see below   LABCAST 0-4 HYALINE  NONE SEEN     Micro:   Lab Results   Component Value Date    BC No growth-preliminary  01/10/2022     Imaging Quick Reads from Previous 7 Days:  XR FOOT LEFT (MIN 3 VIEWS)    Result Date: 1/10/2022   1. Stable left foot radiographs, no interval change since previous study dated 30 January 2020. 2. Abnormal density involving the first metatarsophalangeal joint, unchanged. 3. Degenerative change. 4. No definite evidence of fracture, bony destruction or osteomyelitis. . **This report has been created using voice recognition software. It may contain minor errors which are inherent in voice recognition technology. ** Final report electronically signed by DR Elissa Schulz on 1/10/2022 2:00 PM    XR FOOT RIGHT (MIN 3 VIEWS)    Result Date: 1/10/2022   1. Postoperative changes in the distal tibia. 2. Abnormal density involving the neck of the fifth metacarpal. 3. Degenerative changes. 4. Diffuse osteopenia. 5. Soft tissue swelling over the dorsum of foot. . **This report has been created using voice recognition software. It may contain minor errors which are inherent in voice recognition technology. ** Final report electronically signed by DR Jay Jay Mcmanus on 1/10/2022 2:03 PM    CTA ABDOMINAL AORTA W BILAT RUNOFF W WO CONTRAST    Result Date: 1/10/2022  1. Limited evaluation of the right leg secondary to patient motion artifact. 2. Relatively normal flow in the abdominal aorta, visceral arteries, right and left common, internal and external iliac arteries common femoral arteries. 3. There is an area of significant narrowing in the distal right superficial femoral artery. 4. There is relatively normal flow in the left common and superficial femoral, popliteal, anterior and posterior tibial arteries. 5. Intramedullary jaleesa in the right tibia-fibula. 6. Mild diffuse fatty replacement in the liver. 7. Punctate calcifications in spleen. 8. Small bilateral adrenal nodules. 9. Atrophic pancreas. Dilated pancreatic duct. 10. 2.2 cm fluid collection in the region of the splenic hilum. 11. Enlarged prostate gland. **This report has been created using voice recognition software. It may contain minor errors which are inherent in voice recognition technology. ** Final report electronically signed by DR Jay Jay Mcmanus on 1/10/2022 9:10 PM    Electronically signed by Bernadine Mazariegos.  Kat Gerard M.D. on 1/13/2022  PGY-2 Internal Medicine Resident

## 2022-01-13 NOTE — PROGRESS NOTES
Podiatric Progress Note  Nikolas Worrell  Subjective :   1/13/2022  Patient seen at bedside today on behalf of Dr. Matt Greer. Patient was somnolent during encounter; Per sitter, patient has been sleepy all day. Patient denies any pain to bilateral feet. He currently denies any N/V/F/C/SOB/CP or bilateral calf tenderness. He has no new pedal complaints at this time. 1/12/2022  Patient seen at bedside today alongside Dr. Matt Greer. Patient appeared pleasant, was oriented to person, place and time and in no acute distress. Patient denies any pain to either of his feet. Patient also denies any N/V/F/C/SOB or CP. Patient has no further pedal concerns at this point in time. HPI:  The patient is a homeless 63 y.o. male with significant past medical history of substance abuse (cocaine use, alcohol) tobacco use, and incarcerated left inguinal hernia who being seen at bedside today on behalf of Dr. Matt Greer. Patient was admitted yesterday via [place] [address] ED for frostbite, foot wound, and lab abnormalities. Patient is pleasant, but very poor historian. Patient does not know how long he has had frostbite changes to his right foot nor the wound on his left foot. He thinks they must have been there for several weeks. He relates that his feet have been hurting for several weeks; however when asked by RN prior to getting pain medication, patient stated he cannot feel any pain in his lower legs as they are numb all the time and all of his pain is from his lower back. Patient admits drinking approximately 6 drinks per day daily for the last several years. He currently denies any N/V/F/C/SOB/CP or bilateral calf tenderness. He has no other pedal complaints at this time.     Current Medications:    Current Facility-Administered Medications: nicotine (NICODERM CQ) 14 MG/24HR 1 patch, 1 patch, TransDERmal, Daily  haloperidol lactate (HALDOL) injection 1 mg, 1 mg, IntraMUSCular, Q6H PRN  0.9 % sodium chloride infusion, , IntraVENous, Continuous  HYDROcodone-acetaminophen (NORCO) 5-325 MG per tablet 1 tablet, 1 tablet, Oral, Q6H PRN  phosphorus replacement protocol, , Other, RX Placeholder  calcium replacement protocol, , Other, RX Placeholder  povidone-iodine (BETADINE) 10 % external solution, , Topical, PRN  piperacillin-tazobactam (ZOSYN) 3,375 mg in dextrose 5 % 50 mL IVPB extended infusion (mini-bag), 3,375 mg, IntraVENous, Q8H  sodium chloride flush 0.9 % injection 5-40 mL, 5-40 mL, IntraVENous, 2 times per day  sodium chloride flush 0.9 % injection 5-40 mL, 5-40 mL, IntraVENous, PRN  0.9 % sodium chloride infusion, 25 mL, IntraVENous, PRN  enoxaparin (LOVENOX) injection 40 mg, 40 mg, SubCUTAneous, Q24H  ondansetron (ZOFRAN-ODT) disintegrating tablet 4 mg, 4 mg, Oral, Q8H PRN **OR** ondansetron (ZOFRAN) injection 4 mg, 4 mg, IntraVENous, Q6H PRN  polyethylene glycol (GLYCOLAX) packet 17 g, 17 g, Oral, Daily PRN  acetaminophen (TYLENOL) tablet 650 mg, 650 mg, Oral, Q6H PRN **OR** acetaminophen (TYLENOL) suppository 650 mg, 650 mg, Rectal, Q6H PRN  thiamine tablet 100 mg, 100 mg, Oral, Daily  multivitamin 1 tablet, 1 tablet, Oral, Daily  potassium chloride (KLOR-CON M) extended release tablet 40 mEq, 40 mEq, Oral, PRN **OR** potassium bicarb-citric acid (EFFER-K) effervescent tablet 40 mEq, 40 mEq, Oral, PRN **OR** potassium chloride 10 mEq/100 mL IVPB (Peripheral Line), 10 mEq, IntraVENous, PRN  magnesium sulfate 2000 mg in 50 mL IVPB premix, 2,000 mg, IntraVENous, PRN  LORazepam (ATIVAN) tablet 1 mg, 1 mg, Oral, Q1H PRN **OR** LORazepam (ATIVAN) injection 1 mg, 1 mg, IntraVENous, Q1H PRN **OR** LORazepam (ATIVAN) tablet 2 mg, 2 mg, Oral, Q1H PRN **OR** LORazepam (ATIVAN) injection 2 mg, 2 mg, IntraVENous, Q1H PRN **OR** LORazepam (ATIVAN) tablet 3 mg, 3 mg, Oral, Q1H PRN **OR** LORazepam (ATIVAN) injection 3 mg, 3 mg, IntraVENous, Q1H PRN **OR** LORazepam (ATIVAN) tablet 4 mg, 4 mg, Oral, Q1H PRN **OR** LORazepam (ATIVAN) injection 4 mg, 4 mg, IntraVENous, Q1H PRN    Objective     BP (!) 97/46   Pulse 98   Temp 99.6 °F (37.6 °C) (Oral)   Resp 16   Ht 6' (1.829 m)   Wt 140 lb (63.5 kg)   SpO2 96%   BMI 18.99 kg/m²      I/O:    Intake/Output Summary (Last 24 hours) at 1/13/2022 1813  Last data filed at 1/13/2022 1803  Gross per 24 hour   Intake 1937.6 ml   Output 1075 ml   Net 862.6 ml              Wt Readings from Last 3 Encounters:   01/10/22 140 lb (63.5 kg)   07/06/21 150 lb (68 kg)   05/27/21 160 lb (72.6 kg)       LABS:    Recent Labs     01/12/22  0545 01/13/22  0535   WBC 13.4* 9.3   HGB 10.6* 10.6*   HCT 30.0* 30.4*    200        Recent Labs     01/12/22  0545 01/12/22  0545 01/13/22  0535   *   < > 129*   K 3.2*   < > 3.1*   CL 94*   < > 96*   CO2 23   < > 22*   PHOS 2.2*  --   --    BUN 6*   < > 5*   CREATININE 0.6   < > 0.6    < > = values in this interval not displayed. Recent Labs     01/12/22  0545 01/12/22  1431 01/13/22  0535   PROT 5.4*  --  5.7*   INR  --  1.56*  --         Recent Labs     01/12/22  0545   CKTOTAL 1,063*       Focused Lower Extremity Examination:    Vitals:    BP (!) 97/46   Pulse 98   Temp 99.6 °F (37.6 °C) (Oral)   Resp 16   Ht 6' (1.829 m)   Wt 140 lb (63.5 kg)   SpO2 96%   BMI 18.99 kg/m²      Dermatologic: Dressings to BLE intact. There is a full-thickness ulceration noted to the dorsal aspect of left foot, between metatarsal necks 1 and 2. Wound bed with mainly fibrotic tissue with small areas of infarcted tissue. There is very scant serous drainage noted. No purulence or malodor. Superficial thickness ulceration noted to distal aspect of left second digit; left second digit is mildly erythematous and edematous; erythema and edema have improved since admission. No drainage or malodor noted to superficial ulcer on the distal left second digit.   On the right, there are extensive ischemic and necrotic changes to all 5 digits extending proximally to the distal metatarsal level. There is a deroofed blister noted to the dorsum of the right distal forefoot. There are superficial thickness ulcerations on the dorsal medial aspect of the right hallux and the distal tip of the left fifth digit. Digits 1 through 5 of the distal right forefoot were dryer than the day prior. There is some malodor noted to the right forefoot. Digit tips appear to be hardening.     Vascular: Dorsalis pedis and posterior tibial pulses are palpable bilaterally; DP and PT pulses are diminished to the RLE. Skin temperature is warm to slightly cool from proximal tibial tuberosity to distal digits of left foot; skin temperature is warm to very cold from proximal tibial tuberosity to distal forefoot and digits of right foot. CFT within normal limits to all 5 digits of left foot; CFT absent to distal tips of all 5 digits of right foot. Edema is present to RLE. Pedal hair is absent bilaterally.     Neurovascular: Light touch sensation grossly diminished to the midfoot bilaterally.      Musculoskeletal: Muscle strength 4/5 and symmetric for all muscle groups crossing the ankle joint bilaterally. Decreased ROM noted at the ankle and ST joints bilaterally. No pain with palpation of any foot or ankle structures. Foot and ankle grossly rectus alignment bilaterally. STUDIES:  XR, right foot  Impression       1. Postoperative changes in the distal tibia. 2. Abnormal density involving the neck of the fifth metacarpal.   3. Degenerative changes. 4. Diffuse osteopenia. 5. Soft tissue swelling over the dorsum of foot. .                   **This report has been created using voice recognition software. It may contain minor errors which are inherent in voice recognition technology. **       Final report electronically signed by DR Ann Adair on 1/10/2022 2:03 PM      XR, left foot  Impression       1. Stable left foot radiographs, no interval change since previous study dated 30 January 2020. 2. Abnormal density involving the first metatarsophalangeal joint, unchanged. 3. Degenerative change. 4. No definite evidence of fracture, bony destruction or osteomyelitis. .                   **This report has been created using voice recognition software. It may contain minor errors which are inherent in voice recognition technology. **       Final report electronically signed by DR Jennifer Alcocer on 1/10/2022 2:00 PM      CTA, abdominal aorta with bilateral runoff  Impression       1. Limited evaluation of the right leg secondary to patient motion artifact. 2. Relatively normal flow in the abdominal aorta, visceral arteries, right and left common, internal and external iliac arteries common femoral arteries. 3. There is an area of significant narrowing in the distal right superficial femoral artery. 4. There is relatively normal flow in the left common and superficial femoral, popliteal, anterior and posterior tibial arteries. 5. Intramedullary jaleesa in the right tibia-fibula. 6. Mild diffuse fatty replacement in the liver. 7. Punctate calcifications in spleen. 8. Small bilateral adrenal nodules. 9. Atrophic pancreas. Dilated pancreatic duct. 10. 2.2 cm fluid collection in the region of the splenic hilum. 11. Enlarged prostate gland.           **This report has been created using voice recognition software. It may contain minor errors which are inherent in voice recognition technology. **       Final report electronically signed by DR Jennifer Alcocer on 1/10/2022 9:10 PM     MRI, left foot  Impression       1. The soft tissue ulcer appears to approximate the proximal phalanx of the first digit. Soft tissue gas is noted. Osteomyelitis cannot be excluded.       2. Increased bone marrow signal on STIR without corresponding T1 hypointensity in the first metatarsal is a nonspecific findings. Likely relates to reactive edema at this time.  The finding is equivocal for osteomyelitis.       3. Linear hyperintensity and STIR in the base of the proximal phalanx of the fifth digit without corresponding T1 hypointensity likely relates to reactive bone marrow edema versus a stress reaction. Clinical correlation is recommended.       4. Marked degenerative changes of the first metatarsophalangeal joint. Hallux valgus deformity.               **This report has been created using voice recognition software.  It may contain minor errors which are inherent in voice recognition technology. **       Final report electronically signed by Dr Dione Chapa on 1/13/2022 11:52 AM         ASSESSMENT: Pt. is a 63 y.o. male with:  Principle  Frostbite, right foot  Full-thickness ulceration, left foot    Chronic  Patient Active Problem List   Diagnosis    Incarcerated left inguinal hernia    Tobacco dependence    Substance induced mood disorder ([facility] [address]    Foot pain, bilateral    Wound infection       PLAN:   Frostbite, right foot  Full-thickness ulceration, left foot  -Patient examined and evaluated. -WBC 9.3; patient afebrile  -CRP, 12.2; lactate sepsis 1.4  -Blood cultures 1 and 2, preliminary results: No growth  -Swab cultures results: Staphylococcus aureus and Pseudomonas aeruginosa  -Patient currently on IV vancomycin and IV Zosyn  -Patient received nitroglycerin paste to bilateral feet on 1/10/2022 in the ED  -Reviewed MRI; impression above. Results equivocal for osteomyelitis. No definite changes consistent with osteomyelitis were noted.   -Applied Betadine wet-to-dry to distal right forefoot; wrapped loosely with Kerlix  -Patient okay to weight-bear as tolerated while wearing the postop shoe; encouraged patient to minimize weightbearing  -Discussed with patient that the prognosis for his right foot is very poor; tissue to the distal right forefoot appears severely necrotic; discussed with patient that we will have to ensure that the right foot does not convert into wet gangrene with Betadine wet-to-dry changes daily to encourage the tissue to try and self demarcate.  -Discussed with patient that the prognosis for the left foot is much better; however he does have a full-thickness wound probes to bone. Discussed with patient that if the wound probes to bone, the risk of bone infection increased.  -Ordered bilateral arterial Dopplers to assess blood flow  -Discussed with patient that best option would likely be daily Betadine dressing changes to the right foot, to encourage tissue demarcation with eventual TMA. Discussed that we will continue with packing and Betadine to wound full-thickness ulceration on the dorsal aspect of the left forefoot with superficial thickness ulceration on the distal tip of the left second digit.  -Placed order for daily dressing changes with nitroglycerin paste to the dorsal aspect of the foot bilaterally, Betadine wet-to-dry to distal right forefoot and wound to dorsum of left forefoot wrapped with Kerlix rolls.  -Patient verbalized understanding and agreement with the treatment plan as stated. All of his questions were answered to satisfaction.     Braxton Darden DPM, PGY-2  Podiatric Surgical Resident  1/13/2022   6:13 PM

## 2022-01-13 NOTE — PROGRESS NOTES
Podiatric Progress Note  Rich Cruz  Subjective :   1/12/2022  Patient seen at bedside today alongside Dr. Griffin Trejo. Patient appeared pleasant, was oriented to person, place and time and in no acute distress. Patient denies any pain to either of his feet. Patient also denies any N/V/F/C/SOB or CP. Patient has no further pedal concerns at this point in time. HPI:  The patient is a homeless 63 y.o. male with significant past medical history of substance abuse (cocaine use, alcohol) tobacco use, and incarcerated left inguinal hernia who being seen at bedside today on behalf of Dr. Griffin Trejo. Patient was admitted yesterday via [place] [address] ED for frostbite, foot wound, and lab abnormalities. Patient is pleasant, but very poor historian. Patient does not know how long he has had frostbite changes to his right foot nor the wound on his left foot. He thinks they must have been there for several weeks. He relates that his feet have been hurting for several weeks; however when asked by RN prior to getting pain medication, patient stated he cannot feel any pain in his lower legs as they are numb all the time and all of his pain is from his lower back. Patient admits drinking approximately 6 drinks per day daily for the last several years. He currently denies any N/V/F/C/SOB/CP or bilateral calf tenderness. He has no other pedal complaints at this time.     Current Medications:    Current Facility-Administered Medications: nicotine (NICODERM CQ) 14 MG/24HR 1 patch, 1 patch, TransDERmal, Daily  haloperidol lactate (HALDOL) injection 1 mg, 1 mg, IntraMUSCular, Q6H PRN  0.9 % sodium chloride infusion, , IntraVENous, Continuous  [START ON 1/13/2022] albumin human 25 % IV solution 25 g, 25 g, IntraVENous, Q8H  HYDROcodone-acetaminophen (NORCO) 5-325 MG per tablet 1 tablet, 1 tablet, Oral, Q6H PRN  phosphorus replacement protocol, , Other, RX Placeholder  calcium replacement protocol, , Other, RX Placeholder  povidone-iodine (BETADINE) 10 % external solution, , Topical, PRN  piperacillin-tazobactam (ZOSYN) 3,375 mg in dextrose 5 % 50 mL IVPB extended infusion (mini-bag), 3,375 mg, IntraVENous, Q8H  sodium chloride flush 0.9 % injection 5-40 mL, 5-40 mL, IntraVENous, 2 times per day  sodium chloride flush 0.9 % injection 5-40 mL, 5-40 mL, IntraVENous, PRN  0.9 % sodium chloride infusion, 25 mL, IntraVENous, PRN  enoxaparin (LOVENOX) injection 40 mg, 40 mg, SubCUTAneous, Q24H  ondansetron (ZOFRAN-ODT) disintegrating tablet 4 mg, 4 mg, Oral, Q8H PRN **OR** ondansetron (ZOFRAN) injection 4 mg, 4 mg, IntraVENous, Q6H PRN  polyethylene glycol (GLYCOLAX) packet 17 g, 17 g, Oral, Daily PRN  acetaminophen (TYLENOL) tablet 650 mg, 650 mg, Oral, Q6H PRN **OR** acetaminophen (TYLENOL) suppository 650 mg, 650 mg, Rectal, Q6H PRN  thiamine tablet 100 mg, 100 mg, Oral, Daily  multivitamin 1 tablet, 1 tablet, Oral, Daily  potassium chloride (KLOR-CON M) extended release tablet 40 mEq, 40 mEq, Oral, PRN **OR** potassium bicarb-citric acid (EFFER-K) effervescent tablet 40 mEq, 40 mEq, Oral, PRN **OR** potassium chloride 10 mEq/100 mL IVPB (Peripheral Line), 10 mEq, IntraVENous, PRN  magnesium sulfate 2000 mg in 50 mL IVPB premix, 2,000 mg, IntraVENous, PRN  LORazepam (ATIVAN) tablet 1 mg, 1 mg, Oral, Q1H PRN **OR** LORazepam (ATIVAN) injection 1 mg, 1 mg, IntraVENous, Q1H PRN **OR** LORazepam (ATIVAN) tablet 2 mg, 2 mg, Oral, Q1H PRN **OR** LORazepam (ATIVAN) injection 2 mg, 2 mg, IntraVENous, Q1H PRN **OR** LORazepam (ATIVAN) tablet 3 mg, 3 mg, Oral, Q1H PRN **OR** LORazepam (ATIVAN) injection 3 mg, 3 mg, IntraVENous, Q1H PRN **OR** LORazepam (ATIVAN) tablet 4 mg, 4 mg, Oral, Q1H PRN **OR** LORazepam (ATIVAN) injection 4 mg, 4 mg, IntraVENous, Q1H PRN  vancomycin (VANCOCIN) intermittent dosing (placeholder), , Other, RX Placeholder  vancomycin 1000 mg IVPB in 250 mL D5W addavial, 1,000 mg, IntraVENous, Q12H    Objective /61   Pulse 94   Temp 98.3 °F (36.8 °C) (Oral)   Resp 20   Ht 6' (1.829 m)   Wt 140 lb (63.5 kg)   SpO2 96%   BMI 18.99 kg/m²      I/O:    Intake/Output Summary (Last 24 hours) at 1/12/2022 2341  Last data filed at 1/12/2022 1958  Gross per 24 hour   Intake 600 ml   Output 350 ml   Net 250 ml              Wt Readings from Last 3 Encounters:   01/10/22 140 lb (63.5 kg)   07/06/21 150 lb (68 kg)   05/27/21 160 lb (72.6 kg)       LABS:    Recent Labs     01/11/22  0619 01/12/22  0545   WBC 15.6* 13.4*   HGB 11.6* 10.6*   HCT 33.3* 30.0*    195        Recent Labs     01/12/22  0545   *   K 3.2*   CL 94*   CO2 23   PHOS 2.2*   BUN 6*   CREATININE 0.6        Recent Labs     01/11/22  0619 01/12/22  0545 01/12/22  1431   PROT 5.4* 5.4*  --    INR  --   --  1.56*        Recent Labs     01/10/22  1430 01/12/22  0545   CKTOTAL 3,452* 1,063*       Focused Lower Extremity Examination:    Vitals:    /61   Pulse 94   Temp 98.3 °F (36.8 °C) (Oral)   Resp 20   Ht 6' (1.829 m)   Wt 140 lb (63.5 kg)   SpO2 96%   BMI 18.99 kg/m²      Dermatologic: Dressings to BLE intact. There is a full-thickness ulceration noted to the dorsal aspect of left foot, between metatarsal necks 1 and 2. Wound bed with mainly fibrotic tissue with small areas of infarcted tissue. There is mild amount of serous drainage noted. No purulence or malodor. Superficial thickness ulceration noted to distal aspect of left second digit; left second digit is mildly erythematous and edematous. No drainage or malodor noted to superficial ulcer on the distal left second digit. On the right, there are extensive ischemic and necrotic changes to all 5 digits extending proximally to the distal tarsal level. There is a serous blister noted to the dorsum of the right distal forefoot. There appears to be superficial thickness ulceration on the dorsal medial aspect of the right hallux and the distal tip of the left fifth digit. technology. **       Final report electronically signed by DR Joanie Roca on 1/10/2022 2:00 PM      CTA, abdominal aorta with bilateral runoff  Impression       1. Limited evaluation of the right leg secondary to patient motion artifact. 2. Relatively normal flow in the abdominal aorta, visceral arteries, right and left common, internal and external iliac arteries common femoral arteries. 3. There is an area of significant narrowing in the distal right superficial femoral artery. 4. There is relatively normal flow in the left common and superficial femoral, popliteal, anterior and posterior tibial arteries. 5. Intramedullary jaleesa in the right tibia-fibula. 6. Mild diffuse fatty replacement in the liver. 7. Punctate calcifications in spleen. 8. Small bilateral adrenal nodules. 9. Atrophic pancreas. Dilated pancreatic duct. 10. 2.2 cm fluid collection in the region of the splenic hilum. 11. Enlarged prostate gland. **This report has been created using voice recognition software. It may contain minor errors which are inherent in voice recognition technology. **       Final report electronically signed by DR Joanie Roca on 1/10/2022 9:10 PM         ASSESSMENT: Pt. is a 63 y.o. male with:  Principle  Frostbite, right foot  Full-thickness ulceration, left foot    Chronic  Patient Active Problem List   Diagnosis    Incarcerated left inguinal hernia    Tobacco dependence    Substance induced mood disorder (HCC)    Foot pain, bilateral    Wound infection       PLAN:   Frostbite, right foot  Full-thickness ulceration, left foot  -Patient examined and evaluated.   -WBC 13.4; patient afebrile  -CRP, 12.2; lactate sepsis 1.4  -Blood cultures 1 and 2, preliminary results: No growth  -Swab cultures results: Staphylococcus aureus and Pseudomonas aeruginosa  -Patient currently on IV vancomycin and IV Zosyn  -Patient received nitroglycerin paste to bilateral feet on 1/10/2022 in the ED  -Applied Betadine wet-to-dry to distal right forefoot; wrapped loosely with Kerlix  -Ordered postop shoes; patient is to wear to bilateral feet for transfers and bathroom privileges only  -Patient okay to weight-bear as tolerated; encouraged patient to minimize weightbearing  -Discussed with patient that the prognosis for his right foot is very poor; tissue to the distal right forefoot appears severely necrotic; discussed with patient that we will have to ensure that the right foot does not convert into wet gangrene with Betadine wet-to-dry changes daily to encourage the tissue to try and self demarcate.  -Discussed with patient that the prognosis for the left foot is much better; however he does have a full-thickness wound probes to bone. Discussed with patient that if the wound probes to bone, the risk of bone infection increased.  -Ordered MRI of the left foot to see if there are any changes consistent with osteomyelitis.  -Discussed with patient that best option would likely be daily Betadine dressing changes to the right foot, to encourage tissue demarcation with eventual TMA. Discussed that we will continue with packing and Betadine to wound full-thickness ulceration on the dorsal aspect of the left forefoot with superficial thickness ulceration on the distal tip of the left second digit.  -Placed order for daily dressing changes with nitroglycerin paste to the dorsal aspect of the foot bilaterally, Betadine wet-to-dry to distal right forefoot and wound to dorsum of left forefoot wrapped with Kerlix rolls.  -Patient verbalized understanding and agreement with the treatment plan as stated. All of his questions were answered to satisfaction. Joel Hills DPM, PGY-2  Podiatric Surgical Resident  1/12/2022   11:41 PM      I have inpendently examined the patient, above information and plan discussed with the patient. Labs, cultures, and x-rays were reviewed. I agree with the plan. All questions answered.

## 2022-01-13 NOTE — CARE COORDINATION
DISCHARGE/PLANNING EVALUATION  1/13/22, 11:12 AM EST    Reason for Referral: consideration of Rehab  Mental Status: alert and oriented, not talkative. Decision Making: makes own decisions. Family/Social/Home Environment: pt states he has been homeless for 2 yrs and could not remember where he lived prior to that or what happened that he became homeless. Pt states he has never been  and has no children. Pt states he has a sister that he has stayed with as recent as a couple weeks ago however, pt states [place] and he can't earn money. 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 Pt states he has no current legal issues however, pt states he is a third time felon for \"sticky fingers\", nothing violent or sex related. Current Services including food security, transportation and housekeeping:  homeless  Current Equipment: no dme reported. Payment Source: [facility]  Concerns or Barriers to Discharge: Homeless  Post acute provider list with quality measures, geographic area and applicable managed care information provided. Questions regarding selection process answered: not at this time. Teach Back Method used with pt regarding care plan and discharge planning. Patient verbalized understanding of the plan of care and contribute to goal setting. Patient goals, treatment preferences and discharge plan:  No discharge plan at this time, MRI to be done later today, possible amputation of toes. SW asked pt if he would be willing to stay with sister again while he recuperates, pt agreed and gave sw permission to contact sister. SW called sister, phone does not ring and message states voicemail is not set up.       Electronically signed by TOD Waterman on 1/13/2022 at 11:12 AM

## 2022-01-13 NOTE — CONSULTS
Diabetes without Retinopathy Counseling:  I have discussed with the patient the importance of controlling blood glucose to minimize the risk of developing retinal disease from diabetes. Explained the importance of annual dilated eye exams. Return for follow-up as scheduled. Patient declined to participate in AOD consult. Declined resources.

## 2022-01-14 ENCOUNTER — APPOINTMENT (OUTPATIENT)
Dept: INTERVENTIONAL RADIOLOGY/VASCULAR | Age: 62
End: 2022-01-14
Payer: COMMERCIAL

## 2022-01-14 LAB
AEROBIC CULTURE: ABNORMAL
ALBUMIN SERPL-MCNC: 2.5 G/DL (ref 3.5–5.1)
ALP BLD-CCNC: 38 U/L (ref 38–126)
ALT SERPL-CCNC: 31 U/L (ref 11–66)
ANAEROBIC CULTURE: ABNORMAL
ANAEROBIC CULTURE: ABNORMAL
ANION GAP SERPL CALCULATED.3IONS-SCNC: 11 MEQ/L (ref 8–16)
AST SERPL-CCNC: 46 U/L (ref 5–40)
BILIRUB SERPL-MCNC: 0.8 MG/DL (ref 0.3–1.2)
BUN BLDV-MCNC: 6 MG/DL (ref 7–22)
CALCIUM SERPL-MCNC: 8.2 MG/DL (ref 8.5–10.5)
CHLORIDE BLD-SCNC: 100 MEQ/L (ref 98–111)
CO2: 20 MEQ/L (ref 23–33)
CREAT SERPL-MCNC: 0.6 MG/DL (ref 0.4–1.2)
CREATININE, URINE: 105.1 MG/DL
ERYTHROCYTE [DISTWIDTH] IN BLOOD BY AUTOMATED COUNT: 13.8 % (ref 11.5–14.5)
ERYTHROCYTE [DISTWIDTH] IN BLOOD BY AUTOMATED COUNT: 51.4 FL (ref 35–45)
GFR SERPL CREATININE-BSD FRML MDRD: > 90 ML/MIN/1.73M2
GLUCOSE BLD-MCNC: 89 MG/DL (ref 70–108)
GRAM STAIN RESULT: ABNORMAL
GRAM STAIN RESULT: ABNORMAL
HCT VFR BLD CALC: 29.3 % (ref 42–52)
HEMOGLOBIN: 10.2 GM/DL (ref 14–18)
MAGNESIUM: 1.9 MG/DL (ref 1.6–2.4)
MCH RBC QN AUTO: 35.3 PG (ref 26–33)
MCHC RBC AUTO-ENTMCNC: 34.8 GM/DL (ref 32.2–35.5)
MCV RBC AUTO: 101.4 FL (ref 80–94)
MICROALBUMIN UR-MCNC: < 1.2 MG/DL
MICROALBUMIN/CREAT UR-RTO: 11 MG/G (ref 0–30)
ORGANISM: ABNORMAL
PLATELET # BLD: 180 THOU/MM3 (ref 130–400)
PMV BLD AUTO: 9.9 FL (ref 9.4–12.4)
POTASSIUM SERPL-SCNC: 3.3 MEQ/L (ref 3.5–5.2)
RBC # BLD: 2.89 MILL/MM3 (ref 4.7–6.1)
SODIUM BLD-SCNC: 131 MEQ/L (ref 135–145)
TOTAL PROTEIN: 5.6 G/DL (ref 6.1–8)
WBC # BLD: 5.8 THOU/MM3 (ref 4.8–10.8)

## 2022-01-14 PROCEDURE — 6370000000 HC RX 637 (ALT 250 FOR IP): Performed by: INTERNAL MEDICINE

## 2022-01-14 PROCEDURE — 6360000002 HC RX W HCPCS: Performed by: INTERNAL MEDICINE

## 2022-01-14 PROCEDURE — 6370000000 HC RX 637 (ALT 250 FOR IP): Performed by: PHYSICIAN ASSISTANT

## 2022-01-14 PROCEDURE — 36415 COLL VENOUS BLD VENIPUNCTURE: CPT

## 2022-01-14 PROCEDURE — 2580000003 HC RX 258: Performed by: STUDENT IN AN ORGANIZED HEALTH CARE EDUCATION/TRAINING PROGRAM

## 2022-01-14 PROCEDURE — 1200000000 HC SEMI PRIVATE

## 2022-01-14 PROCEDURE — 6360000002 HC RX W HCPCS: Performed by: STUDENT IN AN ORGANIZED HEALTH CARE EDUCATION/TRAINING PROGRAM

## 2022-01-14 PROCEDURE — 93925 LOWER EXTREMITY STUDY: CPT

## 2022-01-14 PROCEDURE — 83735 ASSAY OF MAGNESIUM: CPT

## 2022-01-14 PROCEDURE — 99233 SBSQ HOSP IP/OBS HIGH 50: CPT | Performed by: HOSPITALIST

## 2022-01-14 PROCEDURE — 80053 COMPREHEN METABOLIC PANEL: CPT

## 2022-01-14 PROCEDURE — 2580000003 HC RX 258: Performed by: HOSPITALIST

## 2022-01-14 PROCEDURE — 85027 COMPLETE CBC AUTOMATED: CPT

## 2022-01-14 RX ORDER — LANOLIN ALCOHOL/MO/W.PET/CERES
6 CREAM (GRAM) TOPICAL NIGHTLY PRN
Status: DISCONTINUED | OUTPATIENT
Start: 2022-01-14 | End: 2022-01-24 | Stop reason: HOSPADM

## 2022-01-14 RX ORDER — HYDROXYZINE HYDROCHLORIDE 25 MG/1
25 TABLET, FILM COATED ORAL NIGHTLY PRN
Status: DISCONTINUED | OUTPATIENT
Start: 2022-01-14 | End: 2022-01-24 | Stop reason: HOSPADM

## 2022-01-14 RX ADMIN — PIPERACILLIN AND TAZOBACTAM 3375 MG: 3; .375 INJECTION, POWDER, LYOPHILIZED, FOR SOLUTION INTRAVENOUS at 20:51

## 2022-01-14 RX ADMIN — ENOXAPARIN SODIUM 40 MG: 100 INJECTION SUBCUTANEOUS at 22:02

## 2022-01-14 RX ADMIN — HYDROCODONE BITARTRATE AND ACETAMINOPHEN 1 TABLET: 5; 325 TABLET ORAL at 15:56

## 2022-01-14 RX ADMIN — SODIUM CHLORIDE: 9 INJECTION, SOLUTION INTRAVENOUS at 06:20

## 2022-01-14 RX ADMIN — PIPERACILLIN AND TAZOBACTAM 3375 MG: 3; .375 INJECTION, POWDER, LYOPHILIZED, FOR SOLUTION INTRAVENOUS at 11:19

## 2022-01-14 RX ADMIN — HYDROCODONE BITARTRATE AND ACETAMINOPHEN 1 TABLET: 5; 325 TABLET ORAL at 22:01

## 2022-01-14 RX ADMIN — LORAZEPAM 1 MG: 1 TABLET ORAL at 02:00

## 2022-01-14 RX ADMIN — Medication 1 TABLET: at 10:31

## 2022-01-14 RX ADMIN — HYDROXYZINE HYDROCHLORIDE 25 MG: 25 TABLET ORAL at 22:01

## 2022-01-14 RX ADMIN — Medication 100 MG: at 10:31

## 2022-01-14 RX ADMIN — SODIUM CHLORIDE: 9 INJECTION, SOLUTION INTRAVENOUS at 19:51

## 2022-01-14 RX ADMIN — HYDROCODONE BITARTRATE AND ACETAMINOPHEN 1 TABLET: 5; 325 TABLET ORAL at 02:00

## 2022-01-14 ASSESSMENT — PAIN SCALES - GENERAL
PAINLEVEL_OUTOF10: 10
PAINLEVEL_OUTOF10: 10
PAINLEVEL_OUTOF10: 3
PAINLEVEL_OUTOF10: 8
PAINLEVEL_OUTOF10: 7

## 2022-01-14 ASSESSMENT — PAIN DESCRIPTION - LOCATION: LOCATION: BACK

## 2022-01-14 NOTE — PROGRESS NOTES
HOSPITALIST PROGRESS NOTE    Patient:  Guss Cranker    Unit/Bed:7K-27/027-A  YOB: 1960  MRN: [id number]   PCP: No primary care provider on file. Date of Admission: 1/10/2022  Date of Service: 1/14/2022  Chief Complaint:- Foot pain    Assessment and Plan:  1. Bilateral LE Ulcers/Eschar/?Frostbite on RLE: Patient presents with what appears to be frostbite of the right foot as well as a full-thickness ulceration of the left foot. Patient is afebrile however leukocytosis was present on arrival.  All digits of the right foot appear necrotic, foul-smelling and gangrenous which extends to the metatarsal level. The left foot has an open ulcer on the dorsal aspect near the second digit however does not appear to be necrotic appearing as the right foot. CTA of the abdominal aorta with bilateral runoff shows an area of significant narrowing in the distal right superficial femoral artery with what appears to be relatively normal flow in the left LE circulation. Podiatry consulted. · MRI of the left foot done 1/13/2022 reveals what appears to be osteomyelitis in the first digit and metatarsal. Soft tissue gas was also noted. As per podiatry, plan for eventual right TMA (IP or OP?) --> need to clarify plan with podiatry   · Continue with broad-spectrum antibiotics with Zosyn started 1/10/2022. Wound cultures show growth of MSSA - Vancocin discontinued. · Patient is not bacteremic and osteomyelitis is not definitively seen on imaging - will reach out and ask IR to perform aortofemoral angiogram with intervention   2. History of EtOH Abuse: Patient with increased agitation, pulling IV lines and refusing therapy. Will add Haldol PRN. Bedside sitter. 3. Mild Rhabdomyolysis: Downtrending. Continue with IV NS at current rate. 4. Diffuse Alcoholic Fatty Liver Disease without Cirrhosis: Evidence for acute EtOH toxic insult due to mild transaminitis with AST > ALT.  Both now downtrending, indicating recovery from EtOH toxic insult. INR 1.56. Platelet count greater than 150,000. Ultrasound of the liver shows fatty infiltration with concurrent hypoechogenicity, normal color flow via Doppler studies. No CBD dilatation. Patient at risk for developing fibrotic changes with continued drinking and eventual cirrhosis. 5. Severe Hypoalbuminemia: Due to severe malnutrition and chronic EtOH use. · IV albumin x3 doses given on 1/12-1/13/2022  6. Moderate Acute on Chronic Asymptomatic Hypovolemic Hyponatremia: Na baseline appears to be around 132. Mild decrease from baseline, current Na 126. Urine Na > 20, Urine osm > 100. · Continue with IV NS.  · Repeat BMP in AM  7. Mild Hyperkalemia: Etiology likely due to poor solute intake along with renal wasting due to hypomagnesemia. Replacement protocols in place for both. 8. Disposition: Eventual right TMA by podiatry. Will need aortofemoral angiogram by either IR or interventional cardiology to assess for possible intervention to further promote wound healing    Subjective (Past 24 hour events):  No acute events overnight. . Has no complaints. Seems to be accepting that he will need eventual amputation. HPI:  70-year-old homeless male with PMH of EtOH and cocaine abuse who came to the ED on 1/10/2022 due to bilateral lower extremity pain and numbness. On arrival patient was noted to be an extremely poor historian. Patient did state that he has had lower extremity numbness and pain for about 2 weeks. Not sure how long he has had ulcers in the foot. Admits to drinking every day, about 6 drinks per day for several years. Denied any chronic medical problems. Physical exam in the ED showed bilateral lower extremity eschars along with ulcers and discolored toes. Admission labs showed multiple significant abnormalities which included hyponatremia, hypokalemia, hyperbilirubinemia leukocytosis, macrocytic anemia, rhabdomyolysis.   Patient admitted for these bilateral ulcers/eschar and multiple lab abnormalities. Please see Assessment and Plan for further details on hospital course. Scheduled Meds:   piperacillin-tazobactam  3,375 mg IntraVENous Q8H    nicotine  1 patch TransDERmal Daily    phosphorus replacement protocol   Other RX Placeholder    calcium replacement protocol   Other RX Placeholder    sodium chloride flush  5-40 mL IntraVENous 2 times per day    enoxaparin  40 mg SubCUTAneous Q24H    thiamine  100 mg Oral Daily    multivitamin  1 tablet Oral Daily     Continuous Infusions:   sodium chloride 75 mL/hr at 01/14/22 0621    sodium chloride         PHYSICAL EXAMINATION:  Blood pressure (!) 146/74, pulse 92, temperature 98.1 °F (36.7 °C), temperature source Oral, resp. rate 16, height 6' (1.829 m), weight 140 lb (63.5 kg), SpO2 98 %. Oxygen Delivery -    General: Acute on chronic ill appearing. Appears disheveled. In no acute distress. HEENT:  normocephalic and atraumatic. No scleral icterus. PERR  Neck: supple. No Thyromegaly. Lungs: clear to auscultation. No retractions  Cardiac: RRR. No JVD. Abdomen: soft. Nontender. Extremities: Open ulceration on the dorsum of the left foot. Right foot from metatarsals to toes are covered in Ace wraps soaked in Betadine. See below for appearance of feet prior to bandaging/wound care. Vasculature: delayed capillary refill. Skin: cool and dry in the LE b/l  Psych:  Alert and oriented x3. Lymph:  No supraclavicular adenopathy. Neurologic:  No focal deficit. No seizures            Diagnostic Data: (All radiographs, EKGs, PFTs, and imaging are personally viewed and interpreted unless otherwise noted).    CBC:   Recent Labs     01/12/22  0545 01/13/22  0535 01/14/22  0639   WBC 13.4* 9.3 5.8   HGB 10.6* 10.6* 10.2*    200 180     BMP:    Recent Labs     01/12/22  0545 01/13/22  0535 01/14/22  0639   * 129* 131*   K 3.2* 3.1* 3.3*   CL 94* 96* 100   CO2 23 22* 20*   BUN 6* 5* 6*   CREATININE 0.6 0.6 0.6   GLUCOSE 98 102 89     Ionized Calcium: No results for input(s): IONCA in the last 72 hours. Magnesium:   Recent Labs     01/14/22  0639   MG 1.9     Phosphorus:   Recent Labs     01/12/22  0545   PHOS 2.2*     Hepatic:   Recent Labs     01/12/22  0545 01/13/22  0535 01/14/22  0639   AST 71* 66* 46*   ALT 36 38 31   BILITOT 0.9 0.8 0.8   ALKPHOS 41 42 38     Vitamin B12: No components found for: B12  Folate:   Lab Results   Component Value Date    FOLATE 9.8 01/10/2022     IRON: No results found for: IRON  Iron Saturation: No components found for: PERCENTFE  TIBC: No results found for: TIBC  Ferritin: No results found for: FERRITIN  Troponin:   Lab Results   Component Value Date    TROPONINT < 0.010 07/06/2021    TROPONINT < 0.010 09/01/2014     BNP: No results found for: BNP  Lipids: No results found for: LDL  INR:   Lab Results   Component Value Date    INR 1.56 01/12/2022     ABG: No results found for: PH, PCO2, PO2, HCO3, O2SAT  TSH:   Lab Results   Component Value Date    TSH 1.650 06/27/2017     Sed Rate: No results found for: SEDRATE  CRP:   Lab Results   Component Value Date    CRP 12.22 01/10/2022     UA:   No results for input(s): SPECGRAV, PHUR, COLORU, CLARITYU, MUCUS, PROTEINU, BLOODU, RBCUA, WBCUA, BACTERIA, NITRU, GLUCOSEU, BILIRUBINUR, UROBILINOGEN, KETUA, LABCAST, LABCASTTY, AMORPHOS in the last 72 hours. Invalid input(s): CRYSTALS  Micro:   Lab Results   Component Value Date    BC No growth-preliminary  01/10/2022     Imaging Quick Reads from Previous 7 Days:  XR FOOT LEFT (MIN 3 VIEWS)    Result Date: 1/10/2022   1. Stable left foot radiographs, no interval change since previous study dated 30 January 2020. 2. Abnormal density involving the first metatarsophalangeal joint, unchanged. 3. Degenerative change. 4. No definite evidence of fracture, bony destruction or osteomyelitis. . **This report has been created using voice recognition software.  It may contain minor errors which are

## 2022-01-14 NOTE — PROGRESS NOTES
Podiatric Progress Note  Daisy Burroughs  Subjective :   1/14/2022  Patient seen at bedside today on behalf of Dr. Annamarie Barry. Patient alert and oriented. Patient continues to deny any pain to bilateral feet. He also denies any N/V/F/C/SOB/CP or bilateral calf tenderness. Patient inquired as to which of his toes he may be a losing; reiterated to patient that he has frostbite involving all 5 digits on the right foot, and that he is going to lose all 5 digits of the right foot. Otherwise, patient has no other pedal complaints at this time. 1/13/2022  Patient seen at bedside today on behalf of Dr. Annamarie Barry. Patient was somnolent during encounter; Per sitter, patient has been sleepy all day. Patient denies any pain to bilateral feet. He currently denies any N/V/F/C/SOB/CP or bilateral calf tenderness. He has no new pedal complaints at this time. 1/12/2022  Patient seen at bedside today alongside Dr. Annamarie Barry. Patient appeared pleasant, was oriented to person, place and time and in no acute distress. Patient denies any pain to either of his feet. Patient also denies any N/V/F/C/SOB or CP. Patient has no further pedal concerns at this point in time. HPI:  The patient is a homeless 63 y.o. male with significant past medical history of substance abuse (cocaine use, alcohol) tobacco use, and incarcerated left inguinal hernia who being seen at bedside today on behalf of Dr. Annamarie Barry. Patient was admitted yesterday via [place] [id number] ED for frostbite, foot wound, and lab abnormalities. Patient is pleasant, but very poor historian. Patient does not know how long he has had frostbite changes to his right foot nor the wound on his left foot. He thinks they must have been there for several weeks.   He relates that his feet have been hurting for several weeks; however when asked by RN prior to getting pain medication, patient stated he cannot feel any pain in his lower legs as they are numb all the time and all of his pain is from his lower back. Patient admits drinking approximately 6 drinks per day daily for the last several years. He currently denies any N/V/F/C/SOB/CP or bilateral calf tenderness. He has no other pedal complaints at this time.     Current Medications:    Current Facility-Administered Medications: piperacillin-tazobactam (ZOSYN) 3,375 mg in dextrose 5 % 50 mL IVPB extended infusion (mini-bag), 3,375 mg, IntraVENous, Q8H  nicotine (NICODERM CQ) 14 MG/24HR 1 patch, 1 patch, TransDERmal, Daily  haloperidol lactate (HALDOL) injection 1 mg, 1 mg, IntraMUSCular, Q6H PRN  0.9 % sodium chloride infusion, , IntraVENous, Continuous  HYDROcodone-acetaminophen (NORCO) 5-325 MG per tablet 1 tablet, 1 tablet, Oral, Q6H PRN  phosphorus replacement protocol, , Other, RX Placeholder  calcium replacement protocol, , Other, RX Placeholder  povidone-iodine (BETADINE) 10 % external solution, , Topical, PRN  sodium chloride flush 0.9 % injection 5-40 mL, 5-40 mL, IntraVENous, 2 times per day  sodium chloride flush 0.9 % injection 5-40 mL, 5-40 mL, IntraVENous, PRN  0.9 % sodium chloride infusion, 25 mL, IntraVENous, PRN  enoxaparin (LOVENOX) injection 40 mg, 40 mg, SubCUTAneous, Q24H  ondansetron (ZOFRAN-ODT) disintegrating tablet 4 mg, 4 mg, Oral, Q8H PRN **OR** ondansetron (ZOFRAN) injection 4 mg, 4 mg, IntraVENous, Q6H PRN  polyethylene glycol (GLYCOLAX) packet 17 g, 17 g, Oral, Daily PRN  acetaminophen (TYLENOL) tablet 650 mg, 650 mg, Oral, Q6H PRN **OR** acetaminophen (TYLENOL) suppository 650 mg, 650 mg, Rectal, Q6H PRN  thiamine tablet 100 mg, 100 mg, Oral, Daily  multivitamin 1 tablet, 1 tablet, Oral, Daily  potassium chloride (KLOR-CON M) extended release tablet 40 mEq, 40 mEq, Oral, PRN **OR** potassium bicarb-citric acid (EFFER-K) effervescent tablet 40 mEq, 40 mEq, Oral, PRN **OR** potassium chloride 10 mEq/100 mL IVPB (Peripheral Line), 10 mEq, IntraVENous, PRN  magnesium sulfate 2000 mg in 50 mL IVPB premix, 2,000 mg, IntraVENous, PRN  LORazepam (ATIVAN) tablet 1 mg, 1 mg, Oral, Q1H PRN **OR** LORazepam (ATIVAN) injection 1 mg, 1 mg, IntraVENous, Q1H PRN **OR** LORazepam (ATIVAN) tablet 2 mg, 2 mg, Oral, Q1H PRN **OR** LORazepam (ATIVAN) injection 2 mg, 2 mg, IntraVENous, Q1H PRN **OR** LORazepam (ATIVAN) tablet 3 mg, 3 mg, Oral, Q1H PRN **OR** LORazepam (ATIVAN) injection 3 mg, 3 mg, IntraVENous, Q1H PRN **OR** LORazepam (ATIVAN) tablet 4 mg, 4 mg, Oral, Q1H PRN **OR** LORazepam (ATIVAN) injection 4 mg, 4 mg, IntraVENous, Q1H PRN    Objective     /69   Pulse 75   Temp 98.5 °F (36.9 °C) (Oral)   Resp 16   Ht 6' (1.829 m)   Wt 140 lb (63.5 kg)   SpO2 97%   BMI 18.99 kg/m²      I/O:    Intake/Output Summary (Last 24 hours) at 1/14/2022 1820  Last data filed at 1/14/2022 1603  Gross per 24 hour   Intake 72968.98 ml   Output 850 ml   Net 59369.98 ml              Wt Readings from Last 3 Encounters:   01/10/22 140 lb (63.5 kg)   07/06/21 150 lb (68 kg)   05/27/21 160 lb (72.6 kg)       LABS:    Recent Labs     01/13/22  0535 01/14/22  0639   WBC 9.3 5.8   HGB 10.6* 10.2*   HCT 30.4* 29.3*    180        Recent Labs     01/12/22  0545 01/13/22  0535 01/14/22  0639   *   < > 131*   K 3.2*   < > 3.3*   CL 94*   < > 100   CO2 23   < > 20*   PHOS 2.2*  --   --    BUN 6*   < > 6*   CREATININE 0.6   < > 0.6    < > = values in this interval not displayed. Recent Labs     01/12/22  0545 01/12/22  1431 01/13/22  0535 01/14/22  0639   PROT   < >  --  5.7* 5.6*   INR  --  1.56*  --   --     < > = values in this interval not displayed. Recent Labs     01/12/22  0545   CKTOTAL 1,063*       Focused Lower Extremity Examination:    Vitals:    /69   Pulse 75   Temp 98.5 °F (36.9 °C) (Oral)   Resp 16   Ht 6' (1.829 m)   Wt 140 lb (63.5 kg)   SpO2 97%   BMI 18.99 kg/m²      Dermatologic: Dressings to BLE intact.  There is a full-thickness ulceration noted to the dorsal aspect of left foot, between metatarsal necks 1 and 2. Wound bed with mainly fibrotic tissue with small areas of infarcted tissue. There is very scant serous drainage noted. No purulence or malodor. Wound appears stable at this time. Superficial thickness ulceration noted to distal aspect of left second digit; left second digit is mildly erythematous and edematous; erythema and edema have improved since admission. No drainage or malodor noted to superficial ulcer on the distal left second digit. On the right, there are extensive ischemic and necrotic changes to all 5 digits extending proximally to the distal metatarsal level. There is a deroofed blister noted to the dorsum of the right distal forefoot. There are superficial thickness ulcerations on the dorsal medial aspect of the right hallux and the distal tip of the left fifth digit. Digits 1 through 5 of the distal right forefoot are beginning to harden and turn very dark in coloration. There is less malodor noted to the right forefoot than during yesterday's encounter.      Vascular: Dorsalis pedis and posterior tibial pulses are palpable bilaterally; DP and PT pulses are diminished to the RLE. Skin temperature is warm to warm from proximal tibial tuberosity to distal digits of left foot; skin temperature is warm to  cold from proximal tibial tuberosity to distal forefoot and digits of right foot. CFT within normal limits to all 5 digits of left foot; CFT absent to distal tips of all 5 digits of right foot. Edema is present to RLE. Pedal hair is absent bilaterally.     Neurovascular: Light touch sensation grossly diminished to the midfoot bilaterally.      Musculoskeletal: Muscle strength 4/5 and symmetric for all muscle groups crossing the ankle joint bilaterally. Decreased ROM noted at the ankle and ST joints bilaterally. No pain with palpation of any foot or ankle structures. Foot and ankle grossly rectus alignment bilaterally.                     STUDIES:  XR, right foot  Impression       1. Postoperative changes in the distal tibia. 2. Abnormal density involving the neck of the fifth metacarpal.   3. Degenerative changes. 4. Diffuse osteopenia. 5. Soft tissue swelling over the dorsum of foot. .                   **This report has been created using voice recognition software. It may contain minor errors which are inherent in voice recognition technology. **       Final report electronically signed by DR Joanie Roca on 1/10/2022 2:03 PM      XR, left foot  Impression       1. Stable left foot radiographs, no interval change since previous study dated 30 January 2020.   2. Abnormal density involving the first metatarsophalangeal joint, unchanged. 3. Degenerative change. 4. No definite evidence of fracture, bony destruction or osteomyelitis. .                   **This report has been created using voice recognition software. It may contain minor errors which are inherent in voice recognition technology. **       Final report electronically signed by DR Joanie Roca on 1/10/2022 2:00 PM      CTA, abdominal aorta with bilateral runoff  Impression       1. Limited evaluation of the right leg secondary to patient motion artifact. 2. Relatively normal flow in the abdominal aorta, visceral arteries, right and left common, internal and external iliac arteries common femoral arteries. 3. There is an area of significant narrowing in the distal right superficial femoral artery. 4. There is relatively normal flow in the left common and superficial femoral, popliteal, anterior and posterior tibial arteries. 5. Intramedullary jaleesa in the right tibia-fibula. 6. Mild diffuse fatty replacement in the liver. 7. Punctate calcifications in spleen. 8. Small bilateral adrenal nodules. 9. Atrophic pancreas. Dilated pancreatic duct. 10. 2.2 cm fluid collection in the region of the splenic hilum.    11. Enlarged prostate gland.           **This report has been created using voice recognition software. It may contain minor errors which are inherent in voice recognition technology. **       Final report electronically signed by DR Elissa Schulz on 1/10/2022 9:10 PM     MRI, left foot  Impression       1. The soft tissue ulcer appears to approximate the proximal phalanx of the first digit. Soft tissue gas is noted. Osteomyelitis cannot be excluded.       2. Increased bone marrow signal on STIR without corresponding T1 hypointensity in the first metatarsal is a nonspecific findings. Likely relates to reactive edema at this time. The finding is equivocal for osteomyelitis.       3. Linear hyperintensity and STIR in the base of the proximal phalanx of the fifth digit without corresponding T1 hypointensity likely relates to reactive bone marrow edema versus a stress reaction. Clinical correlation is recommended.       4. Marked degenerative changes of the first metatarsophalangeal joint. Hallux valgus deformity.               **This report has been created using voice recognition software.  It may contain minor errors which are inherent in voice recognition technology. **       Final report electronically signed by Dr Lorna Barrett on 1/13/2022 11:52 AM     Lower extremity arterial Doppler, bilateral  Impression   1. Normal findings in the left leg. 2. Abnormal findings in the right leg,. Findings suggestive of mild stenosis at the origin of the profunda and possibly at the origin right SFA. Total occlusion mid right SFA with distal collateral reconstitution. Also evidence for trifurcation disease    right side. 3. Aortofemoral angiography is recommended for further evaluation with possible intervention. If desired, this can be arranged through the interventional scheduling desk of [facility]'s radiology department ([phone]).             **This report has been created using voice recognition software.  It may contain minor errors which are inherent in voice recognition technology. **     Final report electronically signed by Dr. Alfa Tan on 1/14/2022 11:14 AM         ASSESSMENT: Pt. is a 63 y.o. male with:  Principle  Frostbite, right foot  Full-thickness ulceration, left foot    Chronic  Patient Active Problem List   Diagnosis    Incarcerated left inguinal hernia    Tobacco dependence    Substance induced mood disorder ([facility] [address]    Foot pain, bilateral    Wound infection       PLAN:   Frostbite, right foot  Full-thickness ulceration, left foot  -Patient examined and evaluated. -WBC 5.8; patient afebrile  -CRP, 12.2; lactate sepsis 1.4  -Blood cultures 1 and 2, preliminary results: No growth  -Swab cultures results: Staphylococcus aureus and Pseudomonas aeruginosa  -Patient currently on IV Zosyn; IV vancomycin de-escalated due to staph aureus cultures being MSSA  -Patient received nitroglycerin paste to bilateral feet on 1/10/2022 in the ED  -Reviewed MRI; impression above. Results equivocal for osteomyelitis. No definite changes consistent with osteomyelitis were noted. -Reviewed lower extremity arterial Doppler; impression above  -Concur with internal medicine team regarding consulting interventional radiology or interventional cardiology regarding possible revascularization to improve blood flow to aid in healing  -Applied Betadine wet-to-dry to distal right forefoot; wrapped loosely with Kerlix  -Patient okay to weight-bear as tolerated while wearing the postop shoe; encouraged patient to minimize weightbearing  -Discussed with patient that the prognosis for his right foot is very poor; tissue to the distal right forefoot appears severely necrotic; discussed with patient that we will have to ensure that the right foot does not convert into wet gangrene with Betadine wet-to-dry changes daily to encourage the tissue to try and self demarcate.  -Discussed with patient that the prognosis for the left foot is much better; however he does have a full-thickness wound probes to bone. Discussed with patient that if the wound probes to bone, the risk of bone infection increased.  -Discussed with patient that best option would likely be daily Betadine dressing changes to the right foot, to encourage tissue demarcation with eventual TMA. Discussed that we will continue with packing and Betadine to wound full-thickness ulceration on the dorsal aspect of the left forefoot with superficial thickness ulceration on the distal tip of the left second digit.  -Placed order for daily dressing changes with nitroglycerin paste to the dorsal aspect of the foot bilaterally, Betadine wet-to-dry to distal right forefoot and wound to dorsum of left forefoot wrapped with Kerlix rolls.  -Patient verbalized understanding and agreement with the treatment plan as stated. All of his questions were answered to satisfaction.     Traci Cortes DPM, PGY-2  Podiatric Surgical Resident  1/14/2022   6:20 PM

## 2022-01-15 LAB
ALBUMIN SERPL-MCNC: 2.4 G/DL (ref 3.5–5.1)
ALP BLD-CCNC: 35 U/L (ref 38–126)
ALT SERPL-CCNC: 26 U/L (ref 11–66)
ANION GAP SERPL CALCULATED.3IONS-SCNC: 12 MEQ/L (ref 8–16)
AST SERPL-CCNC: 39 U/L (ref 5–40)
BILIRUB SERPL-MCNC: 0.5 MG/DL (ref 0.3–1.2)
BUN BLDV-MCNC: 7 MG/DL (ref 7–22)
CALCIUM IONIZED: 1.04 MMOL/L (ref 1.12–1.32)
CALCIUM SERPL-MCNC: 7.9 MG/DL (ref 8.5–10.5)
CHLORIDE BLD-SCNC: 99 MEQ/L (ref 98–111)
CO2: 20 MEQ/L (ref 23–33)
CREAT SERPL-MCNC: 0.6 MG/DL (ref 0.4–1.2)
ERYTHROCYTE [DISTWIDTH] IN BLOOD BY AUTOMATED COUNT: 13.9 % (ref 11.5–14.5)
ERYTHROCYTE [DISTWIDTH] IN BLOOD BY AUTOMATED COUNT: 52.1 FL (ref 35–45)
GFR SERPL CREATININE-BSD FRML MDRD: > 90 ML/MIN/1.73M2
GLUCOSE BLD-MCNC: 87 MG/DL (ref 70–108)
HCT VFR BLD CALC: 29.9 % (ref 42–52)
HEMOGLOBIN: 10 GM/DL (ref 14–18)
MAGNESIUM: 1.7 MG/DL (ref 1.6–2.4)
MCH RBC QN AUTO: 34.2 PG (ref 26–33)
MCHC RBC AUTO-ENTMCNC: 33.4 GM/DL (ref 32.2–35.5)
MCV RBC AUTO: 102.4 FL (ref 80–94)
PHOSPHORUS: 2.6 MG/DL (ref 2.4–4.7)
PLATELET # BLD: 179 THOU/MM3 (ref 130–400)
PMV BLD AUTO: 10.5 FL (ref 9.4–12.4)
POTASSIUM SERPL-SCNC: 2.9 MEQ/L (ref 3.5–5.2)
RBC # BLD: 2.92 MILL/MM3 (ref 4.7–6.1)
SODIUM BLD-SCNC: 131 MEQ/L (ref 135–145)
TOTAL PROTEIN: 5.6 G/DL (ref 6.1–8)
WBC # BLD: 4.8 THOU/MM3 (ref 4.8–10.8)

## 2022-01-15 PROCEDURE — 83735 ASSAY OF MAGNESIUM: CPT

## 2022-01-15 PROCEDURE — 85027 COMPLETE CBC AUTOMATED: CPT

## 2022-01-15 PROCEDURE — 6360000002 HC RX W HCPCS: Performed by: INTERNAL MEDICINE

## 2022-01-15 PROCEDURE — 6370000000 HC RX 637 (ALT 250 FOR IP): Performed by: INTERNAL MEDICINE

## 2022-01-15 PROCEDURE — 6370000000 HC RX 637 (ALT 250 FOR IP): Performed by: PHYSICIAN ASSISTANT

## 2022-01-15 PROCEDURE — 84100 ASSAY OF PHOSPHORUS: CPT

## 2022-01-15 PROCEDURE — 1200000000 HC SEMI PRIVATE

## 2022-01-15 PROCEDURE — 2580000003 HC RX 258: Performed by: HOSPITALIST

## 2022-01-15 PROCEDURE — 36415 COLL VENOUS BLD VENIPUNCTURE: CPT

## 2022-01-15 PROCEDURE — 6360000002 HC RX W HCPCS: Performed by: STUDENT IN AN ORGANIZED HEALTH CARE EDUCATION/TRAINING PROGRAM

## 2022-01-15 PROCEDURE — 99233 SBSQ HOSP IP/OBS HIGH 50: CPT | Performed by: HOSPITALIST

## 2022-01-15 PROCEDURE — 6370000000 HC RX 637 (ALT 250 FOR IP): Performed by: STUDENT IN AN ORGANIZED HEALTH CARE EDUCATION/TRAINING PROGRAM

## 2022-01-15 PROCEDURE — 80053 COMPREHEN METABOLIC PANEL: CPT

## 2022-01-15 PROCEDURE — 82330 ASSAY OF CALCIUM: CPT

## 2022-01-15 PROCEDURE — 2580000003 HC RX 258: Performed by: STUDENT IN AN ORGANIZED HEALTH CARE EDUCATION/TRAINING PROGRAM

## 2022-01-15 RX ADMIN — HYDROCODONE BITARTRATE AND ACETAMINOPHEN 1 TABLET: 5; 325 TABLET ORAL at 04:59

## 2022-01-15 RX ADMIN — NITROGLYCERIN 0.5 INCH: 20 OINTMENT TOPICAL at 09:48

## 2022-01-15 RX ADMIN — HYDROXYZINE HYDROCHLORIDE 25 MG: 25 TABLET ORAL at 21:42

## 2022-01-15 RX ADMIN — HYDROCODONE BITARTRATE AND ACETAMINOPHEN 1 TABLET: 5; 325 TABLET ORAL at 17:16

## 2022-01-15 RX ADMIN — PIPERACILLIN AND TAZOBACTAM 3375 MG: 3; .375 INJECTION, POWDER, LYOPHILIZED, FOR SOLUTION INTRAVENOUS at 17:19

## 2022-01-15 RX ADMIN — ENOXAPARIN SODIUM 40 MG: 100 INJECTION SUBCUTANEOUS at 21:42

## 2022-01-15 RX ADMIN — SODIUM CHLORIDE: 9 INJECTION, SOLUTION INTRAVENOUS at 21:31

## 2022-01-15 RX ADMIN — PIPERACILLIN AND TAZOBACTAM 3375 MG: 3; .375 INJECTION, POWDER, LYOPHILIZED, FOR SOLUTION INTRAVENOUS at 23:22

## 2022-01-15 RX ADMIN — HYDROCODONE BITARTRATE AND ACETAMINOPHEN 1 TABLET: 5; 325 TABLET ORAL at 23:12

## 2022-01-15 RX ADMIN — PIPERACILLIN AND TAZOBACTAM 3375 MG: 3; .375 INJECTION, POWDER, LYOPHILIZED, FOR SOLUTION INTRAVENOUS at 08:42

## 2022-01-15 RX ADMIN — Medication 100 MG: at 09:47

## 2022-01-15 RX ADMIN — SODIUM CHLORIDE: 9 INJECTION, SOLUTION INTRAVENOUS at 08:39

## 2022-01-15 RX ADMIN — Medication 6 MG: at 21:42

## 2022-01-15 RX ADMIN — Medication 1 TABLET: at 09:47

## 2022-01-15 RX ADMIN — PIPERACILLIN AND TAZOBACTAM 3375 MG: 3; .375 INJECTION, POWDER, LYOPHILIZED, FOR SOLUTION INTRAVENOUS at 02:24

## 2022-01-15 RX ADMIN — HYDROCODONE BITARTRATE AND ACETAMINOPHEN 1 TABLET: 5; 325 TABLET ORAL at 11:11

## 2022-01-15 ASSESSMENT — PAIN SCALES - GENERAL
PAINLEVEL_OUTOF10: 5
PAINLEVEL_OUTOF10: 10
PAINLEVEL_OUTOF10: 0
PAINLEVEL_OUTOF10: 10

## 2022-01-15 ASSESSMENT — PAIN DESCRIPTION - DESCRIPTORS
DESCRIPTORS: ACHING

## 2022-01-15 ASSESSMENT — PAIN DESCRIPTION - FREQUENCY
FREQUENCY: CONTINUOUS

## 2022-01-15 ASSESSMENT — PAIN - FUNCTIONAL ASSESSMENT
PAIN_FUNCTIONAL_ASSESSMENT: PREVENTS OR INTERFERES SOME ACTIVE ACTIVITIES AND ADLS
PAIN_FUNCTIONAL_ASSESSMENT: ACTIVITIES ARE NOT PREVENTED
PAIN_FUNCTIONAL_ASSESSMENT: PREVENTS OR INTERFERES SOME ACTIVE ACTIVITIES AND ADLS

## 2022-01-15 ASSESSMENT — PAIN DESCRIPTION - ORIENTATION
ORIENTATION: LOWER

## 2022-01-15 ASSESSMENT — PAIN DESCRIPTION - LOCATION
LOCATION: BACK

## 2022-01-15 ASSESSMENT — PAIN DESCRIPTION - ONSET
ONSET: ON-GOING

## 2022-01-15 ASSESSMENT — PAIN DESCRIPTION - PAIN TYPE
TYPE: ACUTE PAIN
TYPE: ACUTE PAIN
TYPE: CHRONIC PAIN
TYPE: ACUTE PAIN
TYPE: ACUTE PAIN

## 2022-01-15 ASSESSMENT — PAIN DESCRIPTION - PROGRESSION
CLINICAL_PROGRESSION: NOT CHANGED
CLINICAL_PROGRESSION: GRADUALLY WORSENING
CLINICAL_PROGRESSION: NOT CHANGED

## 2022-01-15 NOTE — PROGRESS NOTES
HOSPITALIST PROGRESS NOTE    Patient:  Ivett Turk    Unit/Bed:7K-27/027-A  YOB: 1960  MRN: [id number]   PCP: No primary care provider on file. Date of Admission: 1/10/2022  Date of Service: 1/15/2022  Chief Complaint:- Foot pain    Assessment and Plan:  1. Bilateral LE Ulcers/Eschar/?Frostbite on RLE: Patient presents with what appears to be frostbite of the right foot as well as a full-thickness ulceration of the left foot. Patient is afebrile however leukocytosis was present on arrival.  All digits of the right foot appear necrotic, foul-smelling and gangrenous which extends to the metatarsal level. The left foot has an open ulcer on the dorsal aspect near the second digit however does not appear to be necrotic appearing as the right foot. CTA of the abdominal aorta with bilateral runoff shows an area of significant narrowing in the distal right superficial femoral artery with what appears to be relatively normal flow in the left LE circulation. Podiatry consulted. · MRI of the left foot done 1/13/2022 reveals what appears to be osteomyelitis in the first digit and metatarsal. Soft tissue gas was also noted. As per podiatry, plan for eventual right TMA (IP or OP?) --> need to clarify plan with podiatry   · Continue with broad-spectrum antibiotics with Zosyn started 1/10/2022. Wound cultures show growth of MSSA - Vancocin discontinued. · Patient is not bacteremic and osteomyelitis is not definitively seen on imaging - will reach out and ask IR to perform aortofemoral angiogram with intervention   2. History of EtOH Abuse: Patient with increased agitation, pulling IV lines and refusing therapy. Will add Haldol PRN. Bedside sitter. 3. Mild Rhabdomyolysis: Downtrending. Continue with IV NS at current rate. 4. Diffuse Alcoholic Fatty Liver Disease without Cirrhosis: Evidence for acute EtOH toxic insult due to mild transaminitis with AST > ALT.  Both now downtrending, indicating recovery from EtOH toxic insult. INR 1.56. Platelet count greater than 150,000. Ultrasound of the liver shows fatty infiltration with concurrent hypoechogenicity, normal color flow via Doppler studies. No CBD dilatation. Patient at risk for developing fibrotic changes with continued drinking and eventual cirrhosis. 5. Severe Hypoalbuminemia: Due to severe malnutrition and chronic EtOH use. · IV albumin x3 doses given on 1/12-1/13/2022  6. Moderate Acute on Chronic Asymptomatic Hypovolemic Hyponatremia: Na baseline appears to be around 132. Mild decrease from baseline, current Na 126. Urine Na > 20, Urine osm > 100. · Continue with IV NS.  · Repeat BMP in AM  7. Hypokalemia: Etiology likely due to poor solute intake along with renal wasting due to hypomagnesemia. Replacement protocols in place for both. 8. Disposition: Eventual right TMA by podiatry. Will need aortofemoral angiogram by either IR or interventional cardiology to assess for possible intervention to further promote wound healing    Subjective (Past 24 hour events):  No acute events overnight. . Has no complaints except LBP responding to Norco prn. Seems to be accepting that he will need eventual amputation. HPI:  22-year-old homeless male with PMH of EtOH and cocaine abuse who came to the ED on 1/10/2022 due to bilateral lower extremity pain and numbness. On arrival patient was noted to be an extremely poor historian. Patient did state that he has had lower extremity numbness and pain for about 2 weeks. Not sure how long he has had ulcers in the foot. Admits to drinking every day, about 6 drinks per day for several years. Denied any chronic medical problems. Physical exam in the ED showed bilateral lower extremity eschars along with ulcers and discolored toes. Admission labs showed multiple significant abnormalities which included hyponatremia, hypokalemia, hyperbilirubinemia leukocytosis, macrocytic anemia, rhabdomyolysis.   Patient admitted for these bilateral ulcers/eschar and multiple lab abnormalities. Please see Assessment and Plan for further details on hospital course. Scheduled Meds:   nitroglycerin  0.5 inch Topical BID    piperacillin-tazobactam  3,375 mg IntraVENous Q8H    nicotine  1 patch TransDERmal Daily    phosphorus replacement protocol   Other RX Placeholder    calcium replacement protocol   Other RX Placeholder    sodium chloride flush  5-40 mL IntraVENous 2 times per day    enoxaparin  40 mg SubCUTAneous Q24H    thiamine  100 mg Oral Daily    multivitamin  1 tablet Oral Daily     Continuous Infusions:   sodium chloride 75 mL/hr at 01/15/22 0839    sodium chloride         PHYSICAL EXAMINATION:  Blood pressure (!) 127/59, pulse 84, temperature 98.1 °F (36.7 °C), temperature source Oral, resp. rate 16, height 6' (1.829 m), weight 140 lb (63.5 kg), SpO2 94 %. Oxygen Delivery -    General: Acute on chronic ill appearing. Appears disheveled. In no acute distress. HEENT:  normocephalic and atraumatic. No scleral icterus. PERR  Neck: supple. No Thyromegaly. Lungs: clear to auscultation. No retractions  Cardiac: RRR. No JVD. Abdomen: soft. Nontender. Extremities: Open ulceration on the dorsum of the left foot. Right foot from metatarsals to toes are covered in Ace wraps soaked in Betadine. See below for appearance of feet prior to bandaging/wound care. Vasculature: delayed capillary refill. Skin: cool and dry in the LE b/l  Psych:  Alert and oriented x3. Lymph:  No supraclavicular adenopathy. Neurologic:  No focal deficit. No seizures            Diagnostic Data: (All radiographs, EKGs, PFTs, and imaging are personally viewed and interpreted unless otherwise noted).    CBC:   Recent Labs     01/13/22  0535 01/14/22  0639 01/15/22  0541   WBC 9.3 5.8 4.8   HGB 10.6* 10.2* 10.0*    180 179     BMP:    Recent Labs     01/13/22  0535 01/14/22  0639 01/15/22  0541   * 131* 131*   K 3.1* 3.3* 2.9* CL 96* 100 99   CO2 22* 20* 20*   BUN 5* 6* 7   CREATININE 0.6 0.6 0.6   GLUCOSE 102 89 87     Ionized Calcium: No results for input(s): IONCA in the last 72 hours. Magnesium:   Recent Labs     01/15/22  0541   MG 1.7     Phosphorus:   Recent Labs     01/15/22  0541   PHOS 2.6     Hepatic:   Recent Labs     01/13/22  0535 01/14/22  0639 01/15/22  0541   AST 66* 46* 39   ALT 38 31 26   BILITOT 0.8 0.8 0.5   ALKPHOS 42 38 35*     Vitamin B12: No components found for: B12  Folate:   Lab Results   Component Value Date    FOLATE 9.8 01/10/2022     IRON: No results found for: IRON  Iron Saturation: No components found for: PERCENTFE  TIBC: No results found for: TIBC  Ferritin: No results found for: FERRITIN  Troponin:   Lab Results   Component Value Date    TROPONINT < 0.010 07/06/2021    TROPONINT < 0.010 09/01/2014     BNP: No results found for: BNP  Lipids: No results found for: LDL  INR:   Lab Results   Component Value Date    INR 1.56 01/12/2022     ABG: No results found for: PH, PCO2, PO2, HCO3, O2SAT  TSH:   Lab Results   Component Value Date    TSH 1.650 06/27/2017     Sed Rate: No results found for: SEDRATE  CRP:   Lab Results   Component Value Date    CRP 12.22 01/10/2022     UA:   No results for input(s): SPECGRAV, PHUR, COLORU, CLARITYU, MUCUS, PROTEINU, BLOODU, RBCUA, WBCUA, BACTERIA, NITRU, GLUCOSEU, BILIRUBINUR, UROBILINOGEN, KETUA, LABCAST, LABCASTTY, AMORPHOS in the last 72 hours. Invalid input(s): CRYSTALS  Micro:   Lab Results   Component Value Date    BC No growth-preliminary  01/10/2022     Imaging Quick Reads from Previous 7 Days:  XR FOOT LEFT (MIN 3 VIEWS)    Result Date: 1/10/2022   1. Stable left foot radiographs, no interval change since previous study dated 30 January 2020. 2. Abnormal density involving the first metatarsophalangeal joint, unchanged. 3. Degenerative change. 4. No definite evidence of fracture, bony destruction or osteomyelitis. . **This report has been created using voice recognition software. It may contain minor errors which are inherent in voice recognition technology. ** Final report electronically signed by DR Andrea Tavares on 1/10/2022 2:00 PM    XR FOOT RIGHT (MIN 3 VIEWS)    Result Date: 1/10/2022   1. Postoperative changes in the distal tibia. 2. Abnormal density involving the neck of the fifth metacarpal. 3. Degenerative changes. 4. Diffuse osteopenia. 5. Soft tissue swelling over the dorsum of foot. . **This report has been created using voice recognition software. It may contain minor errors which are inherent in voice recognition technology. ** Final report electronically signed by DR Andrea Tavares on 1/10/2022 2:03 PM    CTA ABDOMINAL AORTA W BILAT RUNOFF W WO CONTRAST    Result Date: 1/10/2022  1. Limited evaluation of the right leg secondary to patient motion artifact. 2. Relatively normal flow in the abdominal aorta, visceral arteries, right and left common, internal and external iliac arteries common femoral arteries. 3. There is an area of significant narrowing in the distal right superficial femoral artery. 4. There is relatively normal flow in the left common and superficial femoral, popliteal, anterior and posterior tibial arteries. 5. Intramedullary jaleesa in the right tibia-fibula. 6. Mild diffuse fatty replacement in the liver. 7. Punctate calcifications in spleen. 8. Small bilateral adrenal nodules. 9. Atrophic pancreas. Dilated pancreatic duct. 10. 2.2 cm fluid collection in the region of the splenic hilum. 11. Enlarged prostate gland. **This report has been created using voice recognition software. It may contain minor errors which are inherent in voice recognition technology. ** Final report electronically signed by DR Andrea Tvaares on 1/10/2022 9:10 PM    With RN in room, patient was updated about and agreed upon the treatment plan, all the questions and concerns were addressed.   Electronically signed by Myrna Colunga M.D. on 1/15/2022

## 2022-01-15 NOTE — PROGRESS NOTES
Podiatric Progress Note  Thalia Cobb  Subjective :   1/15/2022  Patient seen at bedside today on behalf of Dr. Jarrett Flaherty. Patient alert and oriented. Patient continues to deny any pain to bilateral feet. He also denies any N/V/F/C/SOB/CP. That he is still at high risk for losing the digits of the right foot. Otherwise no new complaints. 1/14/2022  Patient seen at bedside today on behalf of Dr. Jarrett Flaherty. Patient alert and oriented. Patient continues to deny any pain to bilateral feet. He also denies any N/V/F/C/SOB/CP or bilateral calf tenderness. Patient inquired as to which of his toes he may be a losing; reiterated to patient that he has frostbite involving all 5 digits on the right foot, and that he is going to lose all 5 digits of the right foot. Otherwise, patient has no other pedal complaints at this time. 1/13/2022  Patient seen at bedside today on behalf of Dr. Jarrett Flaherty. Patient was somnolent during encounter; Per sitter, patient has been sleepy all day. Patient denies any pain to bilateral feet. He currently denies any N/V/F/C/SOB/CP or bilateral calf tenderness. He has no new pedal complaints at this time. 1/12/2022  Patient seen at bedside today alongside Dr. Jarrett Flaherty. Patient appeared pleasant, was oriented to person, place and time and in no acute distress. Patient denies any pain to either of his feet. Patient also denies any N/V/F/C/SOB or CP. Patient has no further pedal concerns at this point in time. HPI:  The patient is a homeless 63 y.o. male with significant past medical history of substance abuse (cocaine use, alcohol) tobacco use, and incarcerated left inguinal hernia who being seen at bedside today on behalf of Dr. Jarrett Flaherty. Patient was admitted yesterday via [place] [address] ED for frostbite, foot wound, and lab abnormalities. Patient is pleasant, but very poor historian.   Patient does not know how long he has had frostbite changes to his right foot nor the wound on his left foot. He thinks they must have been there for several weeks. He relates that his feet have been hurting for several weeks; however when asked by RN prior to getting pain medication, patient stated he cannot feel any pain in his lower legs as they are numb all the time and all of his pain is from his lower back. Patient admits drinking approximately 6 drinks per day daily for the last several years. He currently denies any N/V/F/C/SOB/CP or bilateral calf tenderness. He has no other pedal complaints at this time.     Current Medications:    Current Facility-Administered Medications: nitroglycerin (NITRO-BID) 2 % ointment 0.5 inch, 0.5 inch, Topical, BID  melatonin tablet 6 mg, 6 mg, Oral, Nightly PRN  hydrOXYzine (ATARAX) tablet 25 mg, 25 mg, Oral, Nightly PRN  piperacillin-tazobactam (ZOSYN) 3,375 mg in dextrose 5 % 50 mL IVPB extended infusion (mini-bag), 3,375 mg, IntraVENous, Q8H  nicotine (NICODERM CQ) 14 MG/24HR 1 patch, 1 patch, TransDERmal, Daily  haloperidol lactate (HALDOL) injection 1 mg, 1 mg, IntraMUSCular, Q6H PRN  0.9 % sodium chloride infusion, , IntraVENous, Continuous  HYDROcodone-acetaminophen (NORCO) 5-325 MG per tablet 1 tablet, 1 tablet, Oral, Q6H PRN  phosphorus replacement protocol, , Other, RX Placeholder  calcium replacement protocol, , Other, RX Placeholder  povidone-iodine (BETADINE) 10 % external solution, , Topical, PRN  sodium chloride flush 0.9 % injection 5-40 mL, 5-40 mL, IntraVENous, 2 times per day  sodium chloride flush 0.9 % injection 5-40 mL, 5-40 mL, IntraVENous, PRN  0.9 % sodium chloride infusion, 25 mL, IntraVENous, PRN  enoxaparin (LOVENOX) injection 40 mg, 40 mg, SubCUTAneous, Q24H  ondansetron (ZOFRAN-ODT) disintegrating tablet 4 mg, 4 mg, Oral, Q8H PRN **OR** ondansetron (ZOFRAN) injection 4 mg, 4 mg, IntraVENous, Q6H PRN  polyethylene glycol (GLYCOLAX) packet 17 g, 17 g, Oral, Daily PRN  acetaminophen (TYLENOL) tablet 650 mg, 650 mg, Oral, Q6H PRN **OR** acetaminophen (TYLENOL) suppository 650 mg, 650 mg, Rectal, Q6H PRN  thiamine tablet 100 mg, 100 mg, Oral, Daily  multivitamin 1 tablet, 1 tablet, Oral, Daily  potassium chloride (KLOR-CON M) extended release tablet 40 mEq, 40 mEq, Oral, PRN **OR** potassium bicarb-citric acid (EFFER-K) effervescent tablet 40 mEq, 40 mEq, Oral, PRN **OR** potassium chloride 10 mEq/100 mL IVPB (Peripheral Line), 10 mEq, IntraVENous, PRN  magnesium sulfate 2000 mg in 50 mL IVPB premix, 2,000 mg, IntraVENous, PRN  LORazepam (ATIVAN) tablet 1 mg, 1 mg, Oral, Q1H PRN **OR** LORazepam (ATIVAN) injection 1 mg, 1 mg, IntraVENous, Q1H PRN **OR** LORazepam (ATIVAN) tablet 2 mg, 2 mg, Oral, Q1H PRN **OR** LORazepam (ATIVAN) injection 2 mg, 2 mg, IntraVENous, Q1H PRN **OR** LORazepam (ATIVAN) tablet 3 mg, 3 mg, Oral, Q1H PRN **OR** LORazepam (ATIVAN) injection 3 mg, 3 mg, IntraVENous, Q1H PRN **OR** LORazepam (ATIVAN) tablet 4 mg, 4 mg, Oral, Q1H PRN **OR** LORazepam (ATIVAN) injection 4 mg, 4 mg, IntraVENous, Q1H PRN    Objective     BP (!) 127/59   Pulse 84   Temp 97.9 °F (36.6 °C) (Oral)   Resp 16   Ht 6' (1.829 m)   Wt 140 lb (63.5 kg)   SpO2 94%   BMI 18.99 kg/m²      I/O:    Intake/Output Summary (Last 24 hours) at 1/15/2022 1042  Last data filed at 1/15/2022 0847  Gross per 24 hour   Intake 4540 ml   Output 550 ml   Net 3990 ml              Wt Readings from Last 3 Encounters:   01/10/22 140 lb (63.5 kg)   07/06/21 150 lb (68 kg)   05/27/21 160 lb (72.6 kg)       LABS:    Recent Labs     01/14/22  0639 01/15/22  0541   WBC 5.8 4.8   HGB 10.2* 10.0*   HCT 29.3* 29.9*    179        Recent Labs     01/15/22  0541   *   K 2.9*   CL 99   CO2 20*   PHOS 2.6   BUN 7   CREATININE 0.6        Recent Labs     01/12/22  1431 01/13/22  0535 01/14/22  0639 01/15/22  0541   PROT  --    < > 5.6* 5.6*   INR 1.56*  --   --   --     < > = values in this interval not displayed.         No results for input(s): CKTOTAL, CKMB, CKMBINDEX, TROPONINI in the last 72 hours. Focused Lower Extremity Examination:    Vitals:    BP (!) 127/59   Pulse 84   Temp 97.9 °F (36.6 °C) (Oral)   Resp 16   Ht 6' (1.829 m)   Wt 140 lb (63.5 kg)   SpO2 94%   BMI 18.99 kg/m²      Dermatologic: Dressings to BLE intact. There is a full-thickness ulceration noted to the dorsal aspect of left foot, between metatarsal necks 1 and 2. Wound bed with mainly fibrotic tissue with small areas of infarcted tissue. There is very scant serous drainage noted. No purulence or malodor. Wound appears stable at this time. Superficial thickness ulceration noted to distal aspect of left second digit; left second digit is mildly erythematous and edematous; erythema and edema have improved since admission. No drainage or malodor noted to superficial ulcer on the distal left second digit. On the right, there are extensive ischemic and necrotic changes to all 5 digits extending proximally to the distal metatarsal level. There is a deroofed blister noted to the dorsum of the right distal forefoot. There are superficial thickness ulcerations on the dorsal medial aspect of the right hallux and the distal tip of the left fifth digit. Digits 1 through 5 of the distal right forefoot are beginning to harden and turn very dark in coloration. There is increasing duskiness to the rest of the digits extending toward the metatarsophalangeal joints on the right foot. There is less malodor noted to the right forefoot than during yesterday's encounter.      Vascular: Dorsalis pedis and posterior tibial pulses are palpable bilaterally; DP and PT pulses are diminished to the RLE. Skin temperature is warm to warm from proximal tibial tuberosity to distal digits of left foot; skin temperature is warm to  cold from proximal tibial tuberosity to distal forefoot and digits of right foot. CFT within normal limits to all 5 digits of left foot; CFT absent to distal tips of all 5 digits of right foot. Edema is present to RLE. Pedal hair is absent bilaterally.     Neurovascular: Light touch sensation grossly diminished to the midfoot bilaterally.      Musculoskeletal: Muscle strength 4/5 and symmetric for all muscle groups crossing the ankle joint bilaterally. Decreased ROM noted at the ankle and ST joints bilaterally. No pain with palpation of any foot or ankle structures. Foot and ankle grossly rectus alignment bilaterally. STUDIES:  XR, right foot  Impression       1. Postoperative changes in the distal tibia. 2. Abnormal density involving the neck of the fifth metacarpal.   3. Degenerative changes. 4. Diffuse osteopenia. 5. Soft tissue swelling over the dorsum of foot. .                   **This report has been created using voice recognition software. It may contain minor errors which are inherent in voice recognition technology. **       Final report electronically signed by DR KRAMER [facility] on 1/10/2022 2:03 PM      XR, left foot  Impression       1. Stable left foot radiographs, no interval change since previous study dated 30 January 2020.   2. Abnormal density involving the first metatarsophalangeal joint, unchanged. 3. Degenerative change. 4. No definite evidence of fracture, bony destruction or osteomyelitis. .                   **This report has been created using voice recognition software. It may contain minor errors which are inherent in voice recognition technology. **       Final report electronically signed by DR KRAMER [facility] on 1/10/2022 2:00 PM      CTA, abdominal aorta with bilateral runoff  Impression       1. Limited evaluation of the right leg secondary to patient motion artifact. 2. Relatively normal flow in the abdominal aorta, visceral arteries, right and left common, internal and external iliac arteries common femoral arteries. 3. There is an area of significant narrowing in the distal right superficial femoral artery.    4. There is relatively normal flow in the left common and superficial femoral, popliteal, anterior and posterior tibial arteries. 5. Intramedullary jaleesa in the right tibia-fibula. 6. Mild diffuse fatty replacement in the liver. 7. Punctate calcifications in spleen. 8. Small bilateral adrenal nodules. 9. Atrophic pancreas. Dilated pancreatic duct. 10. 2.2 cm fluid collection in the region of the splenic hilum. 11. Enlarged prostate gland.           **This report has been created using voice recognition software. It may contain minor errors which are inherent in voice recognition technology. **       Final report electronically signed by DR Landy Gonsalez on 1/10/2022 9:10 PM     MRI, left foot  Impression       1. The soft tissue ulcer appears to approximate the proximal phalanx of the first digit. Soft tissue gas is noted. Osteomyelitis cannot be excluded.       2. Increased bone marrow signal on STIR without corresponding T1 hypointensity in the first metatarsal is a nonspecific findings. Likely relates to reactive edema at this time. The finding is equivocal for osteomyelitis.       3. Linear hyperintensity and STIR in the base of the proximal phalanx of the fifth digit without corresponding T1 hypointensity likely relates to reactive bone marrow edema versus a stress reaction. Clinical correlation is recommended.       4. Marked degenerative changes of the first metatarsophalangeal joint. Hallux valgus deformity.               **This report has been created using voice recognition software.  It may contain minor errors which are inherent in voice recognition technology. **       Final report electronically signed by Dr Leesa Bowen on 1/13/2022 11:52 AM     Lower extremity arterial Doppler, bilateral  Impression   1. Normal findings in the left leg. 2. Abnormal findings in the right leg,. Findings suggestive of mild stenosis at the origin of the profunda and possibly at the origin right SFA.  Total occlusion mid right SFA with distal collateral reconstitution. Also evidence for trifurcation disease    right side. 3. Aortofemoral angiography is recommended for further evaluation with possible intervention. If desired, this can be arranged through the interventional scheduling desk of [facility]'s radiology department ([phone]).             **This report has been created using voice recognition software.  It may contain minor errors which are inherent in voice recognition technology. **       Final report electronically signed by Dr. Simin Bond on 1/14/2022 11:14 AM         ASSESSMENT: Pt. is a 63 y.o. male with:  Principle  Frostbite, right foot  Full-thickness ulceration, left foot    Chronic  Patient Active Problem List   Diagnosis    Incarcerated left inguinal hernia    Tobacco dependence    Substance induced mood disorder ([facility] [address]    Foot pain, bilateral    Wound infection       PLAN:   Frostbite, right foot  Full-thickness ulceration, left foot  -Patient examined and evaluated. -WBC 4.8; patient afebrile  -Blood cultures 1 and 2, preliminary results: No growth  -Swab cultures results: Staphylococcus aureus and Pseudomonas aeruginosa  -Patient currently on IV Zosyn; IV vancomycin de-escalated due to staph aureus cultures being MSSA  -Patient received nitroglycerin paste to bilateral feet on 1/10/2022 in the ED  -Reviewed MRI; impression above. Results equivocal for osteomyelitis. No definite changes consistent with osteomyelitis were noted.   -Reviewed lower extremity arterial Doppler; impression above  -Concur with internal medicine team regarding consulting interventional radiology or interventional cardiology regarding possible revascularization to improve blood flow to aid in healing  -Applied nitroglycerin paste to right forefoot, Betadine wet-to-dry to distal right forefoot; wrapped loosely with Kerlix, betadine to wounds of left foot with loosely applied kerlix.  -Patient okay to weight-bear as tolerated while wearing the postop shoe; encouraged patient to minimize weightbearing  -Discussed with patient that the prognosis for his right foot is very poor; tissue to the distal right forefoot appears severely necrotic; discussed with patient that we will have to ensure that the right foot does not convert into wet gangrene with Betadine wet-to-dry changes daily to encourage the tissue to try and self demarcate.  -Discussed with patient that the prognosis for the left foot is much better; however he does have a full-thickness wound probes to bone. Discussed with patient that if the wound probes to bone, the risk of bone infection increased.  -Discussed with patient that best option would likely be daily Betadine dressing changes to the right foot, to encourage tissue demarcation with eventual TMA. Discussed that we will continue with packing and Betadine to wound full-thickness ulceration on the dorsal aspect of the left forefoot with superficial thickness ulceration on the distal tip of the left second digit.  -Placed order for daily dressing changes with nitroglycerin paste to the dorsal aspect of the foot bilaterally, Betadine wet-to-dry to distal right forefoot and wound to dorsum of left forefoot wrapped with Kerlix rolls.  -Patient verbalized understanding and agreement with the treatment plan as stated. All of his questions were answered to satisfaction. DISPO: Pending results of intervention by interventional radiology the right lower extremity, demarcation of ischemic changes on the right foot, eventual need for amputation.     Tamiko Leija DPM, PGY-2  Podiatric Surgical Resident  1/15/2022   10:42 AM

## 2022-01-16 LAB
BLOOD CULTURE, ROUTINE: NORMAL
BLOOD CULTURE, ROUTINE: NORMAL

## 2022-01-16 PROCEDURE — 2580000003 HC RX 258: Performed by: STUDENT IN AN ORGANIZED HEALTH CARE EDUCATION/TRAINING PROGRAM

## 2022-01-16 PROCEDURE — 6370000000 HC RX 637 (ALT 250 FOR IP): Performed by: INTERNAL MEDICINE

## 2022-01-16 PROCEDURE — 2580000003 HC RX 258: Performed by: INTERNAL MEDICINE

## 2022-01-16 PROCEDURE — 6360000002 HC RX W HCPCS: Performed by: INTERNAL MEDICINE

## 2022-01-16 PROCEDURE — 99233 SBSQ HOSP IP/OBS HIGH 50: CPT | Performed by: HOSPITALIST

## 2022-01-16 PROCEDURE — 6370000000 HC RX 637 (ALT 250 FOR IP): Performed by: PHYSICIAN ASSISTANT

## 2022-01-16 PROCEDURE — 6360000002 HC RX W HCPCS: Performed by: STUDENT IN AN ORGANIZED HEALTH CARE EDUCATION/TRAINING PROGRAM

## 2022-01-16 PROCEDURE — 1200000000 HC SEMI PRIVATE

## 2022-01-16 PROCEDURE — 6370000000 HC RX 637 (ALT 250 FOR IP): Performed by: STUDENT IN AN ORGANIZED HEALTH CARE EDUCATION/TRAINING PROGRAM

## 2022-01-16 PROCEDURE — 2580000003 HC RX 258: Performed by: HOSPITALIST

## 2022-01-16 RX ORDER — SODIUM CHLORIDE 0.9 % (FLUSH) 0.9 %
5-40 SYRINGE (ML) INJECTION PRN
Status: CANCELLED | OUTPATIENT
Start: 2022-01-16

## 2022-01-16 RX ORDER — SODIUM CHLORIDE 9 MG/ML
25 INJECTION, SOLUTION INTRAVENOUS PRN
Status: CANCELLED | OUTPATIENT
Start: 2022-01-16

## 2022-01-16 RX ADMIN — NITROGLYCERIN 0.5 INCH: 20 OINTMENT TOPICAL at 09:14

## 2022-01-16 RX ADMIN — PIPERACILLIN AND TAZOBACTAM 3375 MG: 3; .375 INJECTION, POWDER, LYOPHILIZED, FOR SOLUTION INTRAVENOUS at 09:24

## 2022-01-16 RX ADMIN — HYDROCODONE BITARTRATE AND ACETAMINOPHEN 1 TABLET: 5; 325 TABLET ORAL at 05:14

## 2022-01-16 RX ADMIN — HYDROCODONE BITARTRATE AND ACETAMINOPHEN 1 TABLET: 5; 325 TABLET ORAL at 20:30

## 2022-01-16 RX ADMIN — PIPERACILLIN AND TAZOBACTAM 3375 MG: 3; .375 INJECTION, POWDER, LYOPHILIZED, FOR SOLUTION INTRAVENOUS at 16:27

## 2022-01-16 RX ADMIN — HYDROXYZINE HYDROCHLORIDE 25 MG: 25 TABLET ORAL at 20:30

## 2022-01-16 RX ADMIN — Medication 6 MG: at 20:30

## 2022-01-16 RX ADMIN — ENOXAPARIN SODIUM 40 MG: 100 INJECTION SUBCUTANEOUS at 20:29

## 2022-01-16 RX ADMIN — HYDROCODONE BITARTRATE AND ACETAMINOPHEN 1 TABLET: 5; 325 TABLET ORAL at 12:07

## 2022-01-16 RX ADMIN — SODIUM CHLORIDE, PRESERVATIVE FREE 10 ML: 5 INJECTION INTRAVENOUS at 09:15

## 2022-01-16 RX ADMIN — Medication 1 TABLET: at 09:12

## 2022-01-16 RX ADMIN — PIPERACILLIN AND TAZOBACTAM 3375 MG: 3; .375 INJECTION, POWDER, LYOPHILIZED, FOR SOLUTION INTRAVENOUS at 23:28

## 2022-01-16 RX ADMIN — Medication 100 MG: at 09:12

## 2022-01-16 RX ADMIN — SODIUM CHLORIDE: 9 INJECTION, SOLUTION INTRAVENOUS at 14:58

## 2022-01-16 ASSESSMENT — PAIN DESCRIPTION - LOCATION
LOCATION: BACK
LOCATION_2: FOOT
LOCATION: BACK

## 2022-01-16 ASSESSMENT — PAIN DESCRIPTION - PAIN TYPE
TYPE: CHRONIC PAIN
TYPE_2: ACUTE PAIN
TYPE: ACUTE PAIN
TYPE: ACUTE PAIN
TYPE: CHRONIC PAIN

## 2022-01-16 ASSESSMENT — PAIN DESCRIPTION - FREQUENCY
FREQUENCY: CONTINUOUS

## 2022-01-16 ASSESSMENT — PAIN DESCRIPTION - ORIENTATION
ORIENTATION: LOWER
ORIENTATION_2: LEFT;RIGHT
ORIENTATION: LOWER
ORIENTATION: LOWER

## 2022-01-16 ASSESSMENT — PAIN SCALES - GENERAL
PAINLEVEL_OUTOF10: 10
PAINLEVEL_OUTOF10: 0
PAINLEVEL_OUTOF10: 5
PAINLEVEL_OUTOF10: 10

## 2022-01-16 ASSESSMENT — PAIN DESCRIPTION - DESCRIPTORS
DESCRIPTORS_2: TINGLING
DESCRIPTORS: ACHING

## 2022-01-16 ASSESSMENT — PAIN DESCRIPTION - DURATION: DURATION_2: CONTINUOUS

## 2022-01-16 ASSESSMENT — PAIN DESCRIPTION - INTENSITY: RATING_2: 10

## 2022-01-16 ASSESSMENT — PAIN DESCRIPTION - ONSET
ONSET: ON-GOING
ONSET_2: ON-GOING

## 2022-01-16 ASSESSMENT — PAIN DESCRIPTION - PROGRESSION
CLINICAL_PROGRESSION_2: GRADUALLY WORSENING
CLINICAL_PROGRESSION: GRADUALLY WORSENING
CLINICAL_PROGRESSION: GRADUALLY WORSENING

## 2022-01-16 ASSESSMENT — PAIN - FUNCTIONAL ASSESSMENT
PAIN_FUNCTIONAL_ASSESSMENT: ACTIVITIES ARE NOT PREVENTED

## 2022-01-16 NOTE — PROGRESS NOTES
Podiatric Progress Note  Romel Emerson  Subjective :   1/16/2022  Patient seen at bedside today on behalf of Dr. Syed Wick. Patient is alert and oriented to person, place, and time. Patient is extremely displeased that he is still in the hospital. He would like to be able to go smoke. He wants his right foot amputated, and is unsure why the amputation is being delayed. He denies any nausea, vomiting, fever, chills, chest pain, shortness of breath. No other pedal complaints. 1/15/2022  Patient seen at bedside today on behalf of Dr. Syed Wick. Patient alert and oriented. Patient continues to deny any pain to bilateral feet. He also denies any N/V/F/C/SOB/CP. That he is still at high risk for losing the digits of the right foot. Otherwise no new complaints. 1/14/2022  Patient seen at bedside today on behalf of Dr. Syed Wick. Patient alert and oriented. Patient continues to deny any pain to bilateral feet. He also denies any N/V/F/C/SOB/CP or bilateral calf tenderness. Patient inquired as to which of his toes he may be a losing; reiterated to patient that he has frostbite involving all 5 digits on the right foot, and that he is going to lose all 5 digits of the right foot. Otherwise, patient has no other pedal complaints at this time. 1/13/2022  Patient seen at bedside today on behalf of Dr. Syed Wcik. Patient was somnolent during encounter; Per sitter, patient has been sleepy all day. Patient denies any pain to bilateral feet. He currently denies any N/V/F/C/SOB/CP or bilateral calf tenderness. He has no new pedal complaints at this time. 1/12/2022  Patient seen at bedside today alongside Dr. Syed Wick. Patient appeared pleasant, was oriented to person, place and time and in no acute distress. Patient denies any pain to either of his feet. Patient also denies any N/V/F/C/SOB or CP. Patient has no further pedal concerns at this point in time.      HPI:  The patient is a homeless 63 y.o. male with significant past medical history of substance abuse (cocaine use, alcohol) tobacco use, and incarcerated left inguinal hernia who being seen at bedside today on behalf of Dr. Yarely Rodriguez. Patient was admitted yesterday via [place] [address] ED for frostbite, foot wound, and lab abnormalities. Patient is pleasant, but very poor historian. Patient does not know how long he has had frostbite changes to his right foot nor the wound on his left foot. He thinks they must have been there for several weeks. He relates that his feet have been hurting for several weeks; however when asked by RN prior to getting pain medication, patient stated he cannot feel any pain in his lower legs as they are numb all the time and all of his pain is from his lower back. Patient admits drinking approximately 6 drinks per day daily for the last several years. He currently denies any N/V/F/C/SOB/CP or bilateral calf tenderness. He has no other pedal complaints at this time.     Current Medications:    Current Facility-Administered Medications: nitroglycerin (NITRO-BID) 2 % ointment 0.5 inch, 0.5 inch, Topical, Daily  melatonin tablet 6 mg, 6 mg, Oral, Nightly PRN  hydrOXYzine (ATARAX) tablet 25 mg, 25 mg, Oral, Nightly PRN  piperacillin-tazobactam (ZOSYN) 3,375 mg in dextrose 5 % 50 mL IVPB extended infusion (mini-bag), 3,375 mg, IntraVENous, Q8H  nicotine (NICODERM CQ) 14 MG/24HR 1 patch, 1 patch, TransDERmal, Daily  haloperidol lactate (HALDOL) injection 1 mg, 1 mg, IntraMUSCular, Q6H PRN  0.9 % sodium chloride infusion, , IntraVENous, Continuous  HYDROcodone-acetaminophen (NORCO) 5-325 MG per tablet 1 tablet, 1 tablet, Oral, Q6H PRN  phosphorus replacement protocol, , Other, RX Placeholder  calcium replacement protocol, , Other, RX Placeholder  povidone-iodine (BETADINE) 10 % external solution, , Topical, PRN  sodium chloride flush 0.9 % injection 5-40 mL, 5-40 mL, IntraVENous, 2 times per day  sodium chloride flush 0.9 % injection 5-40 mL, 5-40 mL, IntraVENous, PRN  0.9 % sodium chloride infusion, 25 mL, IntraVENous, PRN  enoxaparin (LOVENOX) injection 40 mg, 40 mg, SubCUTAneous, Q24H  ondansetron (ZOFRAN-ODT) disintegrating tablet 4 mg, 4 mg, Oral, Q8H PRN **OR** ondansetron (ZOFRAN) injection 4 mg, 4 mg, IntraVENous, Q6H PRN  polyethylene glycol (GLYCOLAX) packet 17 g, 17 g, Oral, Daily PRN  acetaminophen (TYLENOL) tablet 650 mg, 650 mg, Oral, Q6H PRN **OR** acetaminophen (TYLENOL) suppository 650 mg, 650 mg, Rectal, Q6H PRN  thiamine tablet 100 mg, 100 mg, Oral, Daily  multivitamin 1 tablet, 1 tablet, Oral, Daily  potassium chloride (KLOR-CON M) extended release tablet 40 mEq, 40 mEq, Oral, PRN **OR** potassium bicarb-citric acid (EFFER-K) effervescent tablet 40 mEq, 40 mEq, Oral, PRN **OR** potassium chloride 10 mEq/100 mL IVPB (Peripheral Line), 10 mEq, IntraVENous, PRN  magnesium sulfate 2000 mg in 50 mL IVPB premix, 2,000 mg, IntraVENous, PRN  LORazepam (ATIVAN) tablet 1 mg, 1 mg, Oral, Q1H PRN **OR** LORazepam (ATIVAN) injection 1 mg, 1 mg, IntraVENous, Q1H PRN **OR** LORazepam (ATIVAN) tablet 2 mg, 2 mg, Oral, Q1H PRN **OR** LORazepam (ATIVAN) injection 2 mg, 2 mg, IntraVENous, Q1H PRN **OR** LORazepam (ATIVAN) tablet 3 mg, 3 mg, Oral, Q1H PRN **OR** LORazepam (ATIVAN) injection 3 mg, 3 mg, IntraVENous, Q1H PRN **OR** LORazepam (ATIVAN) tablet 4 mg, 4 mg, Oral, Q1H PRN **OR** LORazepam (ATIVAN) injection 4 mg, 4 mg, IntraVENous, Q1H PRN    Objective     /61   Pulse 84   Temp 100.1 °F (37.8 °C) (Oral)   Resp 18   Ht 6' (1.829 m)   Wt 140 lb (63.5 kg)   SpO2 95%   BMI 18.99 kg/m²      I/O:    Intake/Output Summary (Last 24 hours) at 1/16/2022 1420  Last data filed at 1/16/2022 1255  Gross per 24 hour   Intake 4394.7 ml   Output 250 ml   Net 4144.7 ml              Wt Readings from Last 3 Encounters:   01/10/22 140 lb (63.5 kg)   07/06/21 150 lb (68 kg)   05/27/21 160 lb (72.6 kg)       LABS:    Recent Labs     01/14/22  6587 01/15/22  0541   WBC 5.8 4.8   HGB 10.2* 10.0*   HCT 29.3* 29.9*    179        Recent Labs     01/15/22  0541   *   K 2.9*   CL 99   CO2 20*   PHOS 2.6   BUN 7   CREATININE 0.6        Recent Labs     01/14/22  0639 01/15/22  0541   PROT 5.6* 5.6*        No results for input(s): CKTOTAL, CKMB, CKMBINDEX, TROPONINI in the last 72 hours. Focused Lower Extremity Examination:    Vitals:    /61   Pulse 84   Temp 100.1 °F (37.8 °C) (Oral)   Resp 18   Ht 6' (1.829 m)   Wt 140 lb (63.5 kg)   SpO2 95%   BMI 18.99 kg/m²      Dermatologic: Dressings to BLE intact. There is a full-thickness ulceration noted to the dorsal aspect of left foot, between metatarsal necks 1 and 2. Wound bed with fibro-granular tissue with small areas of infarcted tissue. There is very scant serous drainage noted. No purulence or malodor. Wound appears stable at this time. Superficial thickness ulceration noted to distal aspect of left second digit; left second digit is mildly erythematous and edematous; erythema and edema consistent with previous. No drainage or malodor noted to superficial ulcer on the distal left second digit. On the right, there are extensive ischemic and necrotic changes to all 5 digits extending proximally to the distal metatarsal level. There is a deroofed blister noted to the dorsum of the right distal forefoot. There are superficial thickness ulcerations on the dorsal medial aspect of the right hallux and the distal tip of the left fifth digit. Digits 1 through 5 of the distal right forefoot are beginning to harden and turn very dark in coloration. There is increasing duskiness to the rest of the digits extending toward the metatarsophalangeal joints on the right foot. Some bogginess to distal-lateral aspect of patient's right foot in area of de-roofed blister. There is less malodor noted to the right forefoot than during yesterday's encounter.  Erythema present on the proximal aspect of signed by DR Ricardo Santana on 1/10/2022 2:00 PM      CTA, abdominal aorta with bilateral runoff  Impression       1. Limited evaluation of the right leg secondary to patient motion artifact. 2. Relatively normal flow in the abdominal aorta, visceral arteries, right and left common, internal and external iliac arteries common femoral arteries. 3. There is an area of significant narrowing in the distal right superficial femoral artery. 4. There is relatively normal flow in the left common and superficial femoral, popliteal, anterior and posterior tibial arteries. 5. Intramedullary jaleesa in the right tibia-fibula. 6. Mild diffuse fatty replacement in the liver. 7. Punctate calcifications in spleen. 8. Small bilateral adrenal nodules. 9. Atrophic pancreas. Dilated pancreatic duct. 10. 2.2 cm fluid collection in the region of the splenic hilum. 11. Enlarged prostate gland.           **This report has been created using voice recognition software. It may contain minor errors which are inherent in voice recognition technology. **       Final report electronically signed by DR Ricardo Santana on 1/10/2022 9:10 PM     MRI, left foot  Impression       1. The soft tissue ulcer appears to approximate the proximal phalanx of the first digit. Soft tissue gas is noted. Osteomyelitis cannot be excluded.       2. Increased bone marrow signal on STIR without corresponding T1 hypointensity in the first metatarsal is a nonspecific findings. Likely relates to reactive edema at this time. The finding is equivocal for osteomyelitis.       3. Linear hyperintensity and STIR in the base of the proximal phalanx of the fifth digit without corresponding T1 hypointensity likely relates to reactive bone marrow edema versus a stress reaction. Clinical correlation is recommended.       4. Marked degenerative changes of the first metatarsophalangeal joint.  Hallux valgus deformity.               **This report has been created using voice recognition software.  It may contain minor errors which are inherent in voice recognition technology. **       Final report electronically signed by Dr Courtney Benedict on 1/13/2022 11:52 AM     Lower extremity arterial Doppler, bilateral  Impression   1. Normal findings in the left leg. 2. Abnormal findings in the right leg,. Findings suggestive of mild stenosis at the origin of the profunda and possibly at the origin right SFA. Total occlusion mid right SFA with distal collateral reconstitution. Also evidence for trifurcation disease    right side. 3. Aortofemoral angiography is recommended for further evaluation with possible intervention. If desired, this can be arranged through the interventional scheduling desk of [facility]'s radiology department ([phone]).             **This report has been created using voice recognition software.  It may contain minor errors which are inherent in voice recognition technology. **       Final report electronically signed by Dr. Michelle Robles on 1/14/2022 11:14 AM         ASSESSMENT: Pt. is a 63 y.o. male with:  Principle  Frostbite, right foot  Full-thickness ulceration, left foot    Chronic  Patient Active Problem List   Diagnosis    Incarcerated left inguinal hernia    Tobacco dependence    Substance induced mood disorder ([facility] [address]    Foot pain, bilateral    Wound infection       PLAN:   Frostbite, right foot  Full-thickness ulceration, left foot  -Patient examined and evaluated. -WBC 4.8; patient afebrile  -Blood cultures 1 and 2, preliminary results: No growth  -Swab cultures results: Staphylococcus aureus and Pseudomonas aeruginosa  -Continue IV zosyn  -Reviewed MRI; impression above. Results equivocal for osteomyelitis. No definite changes consistent with osteomyelitis were noted.   -Reviewed lower extremity arterial Doppler; impression above  -Concur with internal medicine team regarding consulting interventional radiology or interventional cardiology regarding possible revascularization to improve blood flow to aid in healing  -Applied nitroglycerin paste to right forefoot, Betadine wet-to-dry to distal right forefoot; wrapped loosely with Kerlix, betadine to wounds of left foot with loosely applied kerlix.  -Patient okay to weight-bear as tolerated while wearing the postop shoe; encouraged patient to minimize weightbearing  -Placed order for daily dressing changes with nitroglycerin paste to the dorsal aspect of the foot bilaterally, Betadine wet-to-dry to distal right forefoot and wound to dorsum of left forefoot wrapped with Kerlix rolls.  -Discussed with patient that it is dangerous to perform amputation before demarcation of skin. There is significant risk that amputation margins will become necrotic and that he will require more proximal amputation  -Surgical planning will be pended until intervention is performed and response to intervention is visualized. -Patient verbalized understanding and agreement with the treatment plan as stated. All of his questions were answered to satisfaction. DISPO: Pending results of intervention by interventional radiology the right lower extremity, demarcation of ischemic changes on the right foot, eventual need for amputation.     Josie Hardwick DPM, PGY-2  Podiatric Surgical Resident  1/16/2022   2:20 PM

## 2022-01-16 NOTE — PROGRESS NOTES
HOSPITALIST PROGRESS NOTE    Patient:  Kendra Bañuelos    Unit/Bed:7K-27/027-A  YOB: 1960  MRN: [id number]   PCP: No primary care provider on file. Date of Admission: 1/10/2022  Date of Service: 1/16/2022  Chief Complaint:- Foot pain    Assessment and Plan:  1. Bilateral LE Ulcers/Eschar/?Frostbite on RLE: Patient presents with what appears to be frostbite of the right foot as well as a full-thickness ulceration of the left foot. Patient is afebrile however leukocytosis was present on arrival.  All digits of the right foot appear necrotic, foul-smelling and gangrenous which extends to the metatarsal level. The left foot has an open ulcer on the dorsal aspect near the second digit however does not appear to be necrotic appearing as the right foot. CTA of the abdominal aorta with bilateral runoff shows an area of significant narrowing in the distal right superficial femoral artery with what appears to be relatively normal flow in the left LE circulation. Podiatry consulted. · MRI of the left foot done 1/13/2022 reveals what appears to be osteomyelitis in the first digit and metatarsal. Soft tissue gas was also noted. As per podiatry, plan for eventual right TMA after interventional radiology performs angiogram with possible intervention. Will schedule angiogram with IR on 1/17/2022. · Continue with broad-spectrum antibiotics with Zosyn started 1/10/2022. Wound cultures show growth of MSSA - Vancocin discontinued. · Patient is not bacteremic and osteomyelitis is not definitively seen on imaging - will reach out and ask IR to perform aortofemoral angiogram with intervention   2. History of EtOH Abuse: Patient with increased agitation, pulling IV lines and refusing therapy. Will add Haldol PRN. Bedside sitter. 3. Mild Rhabdomyolysis: Downtrending. Continue with IV NS at current rate.   4. Diffuse Alcoholic Fatty Liver Disease without Cirrhosis: Evidence for acute EtOH toxic insult due to mild transaminitis with AST > ALT. Both now downtrending, indicating recovery from EtOH toxic insult. INR 1.56. Platelet count greater than 150,000. Ultrasound of the liver shows fatty infiltration with concurrent hypoechogenicity, normal color flow via Doppler studies. No CBD dilatation. Patient at risk for developing fibrotic changes with continued drinking and eventual cirrhosis. 5. Severe Hypoalbuminemia: Due to severe malnutrition and chronic EtOH use. · IV albumin x3 doses given on 1/12-1/13/2022  6. Moderate Acute on Chronic Asymptomatic Hypovolemic Hyponatremia: Improving nearing baseline. Na baseline appears to be around 132. Mild decrease from baseline, current Na 126. Urine Na > 20, Urine osm > 100. · Continue with IV NS.  7. Mild Hyperkalemia: Etiology likely due to poor solute intake along with renal wasting due to hypomagnesemia. Replacement protocols in place for both. 8. Disposition: Will schedule angiogram procedure with IR on 1/17/2022. Podiatry unable to perform amputation as there is a  significant risk of amputated margins becoming necrotic afterward without prior angio intervention. Subjective (Past 24 hour events):  Reports discontent on the delay of the amputation. Wants to smoke and is refusing nicotine patches. HPI:  60-year-old homeless male with PMH of EtOH and cocaine abuse who came to the ED on 1/10/2022 due to bilateral lower extremity pain and numbness. On arrival patient was noted to be an extremely poor historian. Patient did state that he has had lower extremity numbness and pain for about 2 weeks. Not sure how long he has had ulcers in the foot. Admits to drinking every day, about 6 drinks per day for several years. Denied any chronic medical problems. Physical exam in the ED showed bilateral lower extremity eschars along with ulcers and discolored toes.   Admission labs showed multiple significant abnormalities which included hyponatremia, hypokalemia, hyperbilirubinemia leukocytosis, macrocytic anemia, rhabdomyolysis. Patient admitted for these bilateral ulcers/eschar and multiple lab abnormalities. Please see Assessment and Plan for further details on hospital course. Scheduled Meds:   nitroglycerin  0.5 inch Topical Daily    piperacillin-tazobactam  3,375 mg IntraVENous Q8H    nicotine  1 patch TransDERmal Daily    phosphorus replacement protocol   Other RX Placeholder    calcium replacement protocol   Other RX Placeholder    sodium chloride flush  5-40 mL IntraVENous 2 times per day    enoxaparin  40 mg SubCUTAneous Q24H    thiamine  100 mg Oral Daily    multivitamin  1 tablet Oral Daily     Continuous Infusions:   sodium chloride 75 mL/hr at 01/16/22 0410    sodium chloride         PHYSICAL EXAMINATION:  Blood pressure 138/69, pulse 91, temperature 97.8 °F (36.6 °C), temperature source Oral, resp. rate 18, height 6' (1.829 m), weight 140 lb (63.5 kg), SpO2 96 %. Oxygen Delivery -    General: Acute on chronic ill appearing. Appears disheveled. In no acute distress. HEENT:  normocephalic and atraumatic. No scleral icterus. PERR  Neck: supple. No Thyromegaly. Lungs: clear to auscultation. No retractions  Cardiac: RRR. No JVD. Abdomen: soft. Nontender. Extremities: Open ulceration on the dorsum of the left foot. Right foot from metatarsals to toes are covered in Ace wraps soaked in Betadine. See below for appearance of feet prior to bandaging/wound care. Vasculature: delayed capillary refill. Skin: cool and dry in the LE b/l  Psych:  Alert and oriented x3. Lymph:  No supraclavicular adenopathy. Neurologic:  No focal deficit. No seizures            Diagnostic Data: (All radiographs, EKGs, PFTs, and imaging are personally viewed and interpreted unless otherwise noted).    CBC:   Recent Labs     01/14/22  0639 01/15/22  0541   WBC 5.8 4.8   HGB 10.2* 10.0*    179     BMP:    Recent Labs     01/14/22  0639 01/15/22  0541 * 131*   K 3.3* 2.9*    99   CO2 20* 20*   BUN 6* 7   CREATININE 0.6 0.6   GLUCOSE 89 87     Ionized Calcium: No results for input(s): IONCA in the last 72 hours. Magnesium:   Recent Labs     01/15/22  0541   MG 1.7     Phosphorus:   Recent Labs     01/15/22  0541   PHOS 2.6     Hepatic:   Recent Labs     01/14/22  0639 01/15/22  0541   AST 46* 39   ALT 31 26   BILITOT 0.8 0.5   ALKPHOS 38 35*     Vitamin B12: No components found for: B12  Folate:   Lab Results   Component Value Date    FOLATE 9.8 01/10/2022     IRON: No results found for: IRON  Iron Saturation: No components found for: PERCENTFE  TIBC: No results found for: TIBC  Ferritin: No results found for: FERRITIN  Troponin:   Lab Results   Component Value Date    TROPONINT < 0.010 07/06/2021    TROPONINT < 0.010 09/01/2014     BNP: No results found for: BNP  Lipids: No results found for: LDL  INR:   Lab Results   Component Value Date    INR 1.56 01/12/2022     ABG: No results found for: PH, PCO2, PO2, HCO3, O2SAT  TSH:   Lab Results   Component Value Date    TSH 1.650 06/27/2017     Sed Rate: No results found for: SEDRATE  CRP:   Lab Results   Component Value Date    CRP 12.22 01/10/2022     UA:   No results for input(s): SPECGRAV, PHUR, COLORU, CLARITYU, MUCUS, PROTEINU, BLOODU, RBCUA, WBCUA, BACTERIA, NITRU, GLUCOSEU, BILIRUBINUR, UROBILINOGEN, KETUA, LABCAST, LABCASTTY, AMORPHOS in the last 72 hours. Invalid input(s): CRYSTALS  Micro:   Lab Results   Component Value Date    BC No growth-preliminary No growth  01/10/2022     Imaging Quick Reads from Previous 7 Days:  XR FOOT LEFT (MIN 3 VIEWS)    Result Date: 1/10/2022   1. Stable left foot radiographs, no interval change since previous study dated 30 January 2020. 2. Abnormal density involving the first metatarsophalangeal joint, unchanged. 3. Degenerative change. 4. No definite evidence of fracture, bony destruction or osteomyelitis. . **This report has been created using voice recognition software. It may contain minor errors which are inherent in voice recognition technology. ** Final report electronically signed by DR Deven Melendez on 1/10/2022 2:00 PM    XR FOOT RIGHT (MIN 3 VIEWS)    Result Date: 1/10/2022   1. Postoperative changes in the distal tibia. 2. Abnormal density involving the neck of the fifth metacarpal. 3. Degenerative changes. 4. Diffuse osteopenia. 5. Soft tissue swelling over the dorsum of foot. . **This report has been created using voice recognition software. It may contain minor errors which are inherent in voice recognition technology. ** Final report electronically signed by DR Deven Melendez on 1/10/2022 2:03 PM    CTA ABDOMINAL AORTA W BILAT RUNOFF W WO CONTRAST    Result Date: 1/10/2022  1. Limited evaluation of the right leg secondary to patient motion artifact. 2. Relatively normal flow in the abdominal aorta, visceral arteries, right and left common, internal and external iliac arteries common femoral arteries. 3. There is an area of significant narrowing in the distal right superficial femoral artery. 4. There is relatively normal flow in the left common and superficial femoral, popliteal, anterior and posterior tibial arteries. 5. Intramedullary jaleesa in the right tibia-fibula. 6. Mild diffuse fatty replacement in the liver. 7. Punctate calcifications in spleen. 8. Small bilateral adrenal nodules. 9. Atrophic pancreas. Dilated pancreatic duct. 10. 2.2 cm fluid collection in the region of the splenic hilum. 11. Enlarged prostate gland. **This report has been created using voice recognition software. It may contain minor errors which are inherent in voice recognition technology. ** Final report electronically signed by DR eDven Melendez on 1/10/2022 9:10 PM    Electronically signed by Gulshan Elias.  Kelly Anguiano M.D. on 1/16/2022  PGY-2 Internal Medicine Resident

## 2022-01-17 ENCOUNTER — APPOINTMENT (OUTPATIENT)
Dept: INTERVENTIONAL RADIOLOGY/VASCULAR | Age: 62
End: 2022-01-17
Payer: COMMERCIAL

## 2022-01-17 LAB
ABSOLUTE RETIC #: 35 THOU/MM3 (ref 20–115)
ANION GAP SERPL CALCULATED.3IONS-SCNC: 11 MEQ/L (ref 8–16)
BASOPHILS # BLD: 1.1 %
BASOPHILS ABSOLUTE: 0 THOU/MM3 (ref 0–0.1)
BUN BLDV-MCNC: 6 MG/DL (ref 7–22)
CALCIUM SERPL-MCNC: 7.8 MG/DL (ref 8.5–10.5)
CHLORIDE BLD-SCNC: 100 MEQ/L (ref 98–111)
CO2: 20 MEQ/L (ref 23–33)
CREAT SERPL-MCNC: 0.5 MG/DL (ref 0.4–1.2)
EOSINOPHIL # BLD: 3.3 %
EOSINOPHILS ABSOLUTE: 0.1 THOU/MM3 (ref 0–0.4)
ERYTHROCYTE [DISTWIDTH] IN BLOOD BY AUTOMATED COUNT: 13.9 % (ref 11.5–14.5)
ERYTHROCYTE [DISTWIDTH] IN BLOOD BY AUTOMATED COUNT: 51.8 FL (ref 35–45)
FERRITIN: 806 NG/ML (ref 22–322)
GFR SERPL CREATININE-BSD FRML MDRD: > 90 ML/MIN/1.73M2
GLUCOSE BLD-MCNC: 93 MG/DL (ref 70–108)
HCT VFR BLD CALC: 29.6 % (ref 42–52)
HEMOGLOBIN: 10 GM/DL (ref 14–18)
IMMATURE GRANS (ABS): 0.06 THOU/MM3 (ref 0–0.07)
IMMATURE GRANULOCYTES: 1.7 %
IMMATURE RETIC FRACT: 7.3 % (ref 2.3–13.4)
IRON SATURATION: 18 % (ref 20–50)
IRON: 21 UG/DL (ref 65–195)
LYMPHOCYTES # BLD: 22.2 %
LYMPHOCYTES ABSOLUTE: 0.8 THOU/MM3 (ref 1–4.8)
MAGNESIUM: 1.6 MG/DL (ref 1.6–2.4)
MCH RBC QN AUTO: 34.4 PG (ref 26–33)
MCHC RBC AUTO-ENTMCNC: 33.8 GM/DL (ref 32.2–35.5)
MCV RBC AUTO: 101.7 FL (ref 80–94)
MONOCYTES # BLD: 11.6 %
MONOCYTES ABSOLUTE: 0.4 THOU/MM3 (ref 0.4–1.3)
NUCLEATED RED BLOOD CELLS: 0 /100 WBC
PLATELET # BLD: 178 THOU/MM3 (ref 130–400)
PLATELET ESTIMATE: ADEQUATE
PMV BLD AUTO: 10.7 FL (ref 9.4–12.4)
POTASSIUM SERPL-SCNC: 2.8 MEQ/L (ref 3.5–5.2)
POTASSIUM SERPL-SCNC: 3.5 MEQ/L (ref 3.5–5.2)
RBC # BLD: 2.91 MILL/MM3 (ref 4.7–6.1)
RETIC HEMOGLOBIN: 30.1 PG (ref 28.2–35.7)
RETICULOCYTE ABSOLUTE COUNT: 1.2 % (ref 0.5–2)
SCAN OF BLOOD SMEAR: NORMAL
SEG NEUTROPHILS: 60.1 %
SEGMENTED NEUTROPHILS ABSOLUTE COUNT: 2.2 THOU/MM3 (ref 1.8–7.7)
SODIUM BLD-SCNC: 131 MEQ/L (ref 135–145)
TOTAL IRON BINDING CAPACITY: 117 UG/DL (ref 171–450)
WBC # BLD: 3.6 THOU/MM3 (ref 4.8–10.8)

## 2022-01-17 PROCEDURE — 83550 IRON BINDING TEST: CPT

## 2022-01-17 PROCEDURE — 2580000003 HC RX 258: Performed by: STUDENT IN AN ORGANIZED HEALTH CARE EDUCATION/TRAINING PROGRAM

## 2022-01-17 PROCEDURE — 047K3ZZ DILATION OF RIGHT FEMORAL ARTERY, PERCUTANEOUS APPROACH: ICD-10-PCS | Performed by: RADIOLOGY

## 2022-01-17 PROCEDURE — 1200000000 HC SEMI PRIVATE

## 2022-01-17 PROCEDURE — 85025 COMPLETE CBC W/AUTO DIFF WBC: CPT

## 2022-01-17 PROCEDURE — 6370000000 HC RX 637 (ALT 250 FOR IP): Performed by: STUDENT IN AN ORGANIZED HEALTH CARE EDUCATION/TRAINING PROGRAM

## 2022-01-17 PROCEDURE — 85046 RETICYTE/HGB CONCENTRATE: CPT

## 2022-01-17 PROCEDURE — 99233 SBSQ HOSP IP/OBS HIGH 50: CPT | Performed by: HOSPITALIST

## 2022-01-17 PROCEDURE — 36415 COLL VENOUS BLD VENIPUNCTURE: CPT

## 2022-01-17 PROCEDURE — 6370000000 HC RX 637 (ALT 250 FOR IP): Performed by: INTERNAL MEDICINE

## 2022-01-17 PROCEDURE — B41F1ZZ FLUOROSCOPY OF RIGHT LOWER EXTREMITY ARTERIES USING LOW OSMOLAR CONTRAST: ICD-10-PCS | Performed by: RADIOLOGY

## 2022-01-17 PROCEDURE — 2580000003 HC RX 258: Performed by: HOSPITALIST

## 2022-01-17 PROCEDURE — 6360000002 HC RX W HCPCS: Performed by: RADIOLOGY

## 2022-01-17 PROCEDURE — 6360000002 HC RX W HCPCS: Performed by: INTERNAL MEDICINE

## 2022-01-17 PROCEDURE — 82728 ASSAY OF FERRITIN: CPT

## 2022-01-17 PROCEDURE — 2709999900 IR ANGIOGRAM EXTREMITY RIGHT

## 2022-01-17 PROCEDURE — 83540 ASSAY OF IRON: CPT

## 2022-01-17 PROCEDURE — 6360000002 HC RX W HCPCS: Performed by: STUDENT IN AN ORGANIZED HEALTH CARE EDUCATION/TRAINING PROGRAM

## 2022-01-17 PROCEDURE — 37224 HC PLASTY UNI FEMPOP: CPT

## 2022-01-17 PROCEDURE — 75716 ARTERY X-RAYS ARMS/LEGS: CPT

## 2022-01-17 PROCEDURE — 84132 ASSAY OF SERUM POTASSIUM: CPT

## 2022-01-17 PROCEDURE — 6360000004 HC RX CONTRAST MEDICATION: Performed by: RADIOLOGY

## 2022-01-17 PROCEDURE — 6370000000 HC RX 637 (ALT 250 FOR IP): Performed by: PHYSICIAN ASSISTANT

## 2022-01-17 PROCEDURE — 80048 BASIC METABOLIC PNL TOTAL CA: CPT

## 2022-01-17 PROCEDURE — 75625 CONTRAST EXAM ABDOMINL AORTA: CPT

## 2022-01-17 PROCEDURE — 83735 ASSAY OF MAGNESIUM: CPT

## 2022-01-17 PROCEDURE — 6370000000 HC RX 637 (ALT 250 FOR IP): Performed by: RADIOLOGY

## 2022-01-17 RX ORDER — MIDAZOLAM HYDROCHLORIDE 1 MG/ML
1 INJECTION INTRAMUSCULAR; INTRAVENOUS ONCE
Status: COMPLETED | OUTPATIENT
Start: 2022-01-17 | End: 2022-01-17

## 2022-01-17 RX ORDER — CLOPIDOGREL BISULFATE 75 MG/1
75 TABLET ORAL DAILY
Status: DISCONTINUED | OUTPATIENT
Start: 2022-01-18 | End: 2022-01-24 | Stop reason: HOSPADM

## 2022-01-17 RX ORDER — FENTANYL CITRATE 50 UG/ML
50 INJECTION, SOLUTION INTRAMUSCULAR; INTRAVENOUS ONCE
Status: COMPLETED | OUTPATIENT
Start: 2022-01-17 | End: 2022-01-17

## 2022-01-17 RX ORDER — HEPARIN SODIUM 1000 [USP'U]/ML
4000 INJECTION, SOLUTION INTRAVENOUS; SUBCUTANEOUS ONCE
Status: COMPLETED | OUTPATIENT
Start: 2022-01-17 | End: 2022-01-17

## 2022-01-17 RX ORDER — CLOPIDOGREL BISULFATE 75 MG/1
300 TABLET ORAL ONCE
Status: COMPLETED | OUTPATIENT
Start: 2022-01-17 | End: 2022-01-17

## 2022-01-17 RX ADMIN — ENOXAPARIN SODIUM 40 MG: 100 INJECTION SUBCUTANEOUS at 21:06

## 2022-01-17 RX ADMIN — NITROGLYCERIN 0.5 INCH: 20 OINTMENT TOPICAL at 09:36

## 2022-01-17 RX ADMIN — HEPARIN SODIUM 4000 UNITS: 1000 INJECTION, SOLUTION INTRAVENOUS; SUBCUTANEOUS at 14:38

## 2022-01-17 RX ADMIN — POTASSIUM CHLORIDE 10 MEQ: 7.46 INJECTION, SOLUTION INTRAVENOUS at 09:22

## 2022-01-17 RX ADMIN — PIPERACILLIN AND TAZOBACTAM 3375 MG: 3; .375 INJECTION, POWDER, LYOPHILIZED, FOR SOLUTION INTRAVENOUS at 16:05

## 2022-01-17 RX ADMIN — Medication 6 MG: at 21:06

## 2022-01-17 RX ADMIN — POTASSIUM CHLORIDE 10 MEQ: 7.46 INJECTION, SOLUTION INTRAVENOUS at 16:06

## 2022-01-17 RX ADMIN — FENTANYL CITRATE 50 MCG: 50 INJECTION, SOLUTION INTRAMUSCULAR; INTRAVENOUS at 14:48

## 2022-01-17 RX ADMIN — FENTANYL CITRATE 50 MCG: 50 INJECTION, SOLUTION INTRAMUSCULAR; INTRAVENOUS at 13:39

## 2022-01-17 RX ADMIN — FENTANYL CITRATE 50 MCG: 50 INJECTION, SOLUTION INTRAMUSCULAR; INTRAVENOUS at 14:23

## 2022-01-17 RX ADMIN — PIPERACILLIN AND TAZOBACTAM 3375 MG: 3; .375 INJECTION, POWDER, LYOPHILIZED, FOR SOLUTION INTRAVENOUS at 10:22

## 2022-01-17 RX ADMIN — CLOPIDOGREL BISULFATE 300 MG: 300 TABLET, FILM COATED ORAL at 15:14

## 2022-01-17 RX ADMIN — HYDROCODONE BITARTRATE AND ACETAMINOPHEN 1 TABLET: 5; 325 TABLET ORAL at 08:07

## 2022-01-17 RX ADMIN — HYDROCODONE BITARTRATE AND ACETAMINOPHEN 1 TABLET: 5; 325 TABLET ORAL at 21:07

## 2022-01-17 RX ADMIN — FENTANYL CITRATE 50 MCG: 50 INJECTION, SOLUTION INTRAMUSCULAR; INTRAVENOUS at 15:01

## 2022-01-17 RX ADMIN — POTASSIUM CHLORIDE 10 MEQ: 7.46 INJECTION, SOLUTION INTRAVENOUS at 17:14

## 2022-01-17 RX ADMIN — MIDAZOLAM 1 MG: 1 INJECTION INTRAMUSCULAR; INTRAVENOUS at 13:39

## 2022-01-17 RX ADMIN — IOPAMIDOL 195 ML: 612 INJECTION, SOLUTION INTRAVENOUS at 15:03

## 2022-01-17 RX ADMIN — SODIUM CHLORIDE: 9 INJECTION, SOLUTION INTRAVENOUS at 04:16

## 2022-01-17 RX ADMIN — HYDROXYZINE HYDROCHLORIDE 25 MG: 25 TABLET ORAL at 21:07

## 2022-01-17 RX ADMIN — POTASSIUM CHLORIDE 10 MEQ: 7.46 INJECTION, SOLUTION INTRAVENOUS at 12:18

## 2022-01-17 RX ADMIN — POTASSIUM CHLORIDE 10 MEQ: 7.46 INJECTION, SOLUTION INTRAVENOUS at 10:20

## 2022-01-17 RX ADMIN — MIDAZOLAM 1 MG: 1 INJECTION INTRAMUSCULAR; INTRAVENOUS at 14:33

## 2022-01-17 RX ADMIN — HYDROCODONE BITARTRATE AND ACETAMINOPHEN 1 TABLET: 5; 325 TABLET ORAL at 02:30

## 2022-01-17 RX ADMIN — POTASSIUM CHLORIDE 10 MEQ: 7.46 INJECTION, SOLUTION INTRAVENOUS at 11:27

## 2022-01-17 ASSESSMENT — PAIN DESCRIPTION - LOCATION
LOCATION: BACK

## 2022-01-17 ASSESSMENT — PAIN SCALES - GENERAL
PAINLEVEL_OUTOF10: 9
PAINLEVEL_OUTOF10: 7
PAINLEVEL_OUTOF10: 10
PAINLEVEL_OUTOF10: 7
PAINLEVEL_OUTOF10: 9
PAINLEVEL_OUTOF10: 9
PAINLEVEL_OUTOF10: 0
PAINLEVEL_OUTOF10: 10

## 2022-01-17 ASSESSMENT — PAIN DESCRIPTION - ONSET
ONSET: ON-GOING
ONSET: ON-GOING

## 2022-01-17 ASSESSMENT — PAIN - FUNCTIONAL ASSESSMENT
PAIN_FUNCTIONAL_ASSESSMENT: ACTIVITIES ARE NOT PREVENTED
PAIN_FUNCTIONAL_ASSESSMENT: ACTIVITIES ARE NOT PREVENTED

## 2022-01-17 ASSESSMENT — PAIN DESCRIPTION - PAIN TYPE
TYPE: CHRONIC PAIN

## 2022-01-17 ASSESSMENT — PAIN DESCRIPTION - PROGRESSION
CLINICAL_PROGRESSION: NOT CHANGED
CLINICAL_PROGRESSION: GRADUALLY WORSENING

## 2022-01-17 ASSESSMENT — PAIN DESCRIPTION - DESCRIPTORS
DESCRIPTORS: ACHING
DESCRIPTORS: ACHING

## 2022-01-17 ASSESSMENT — PAIN DESCRIPTION - FREQUENCY
FREQUENCY: CONTINUOUS
FREQUENCY: CONTINUOUS

## 2022-01-17 NOTE — H&P
Formulation and discussion of sedation / procedure plans, risks, benefits, side effects and alternatives with patient and/or responsible adult completed.     Electronically signed by January Bateman MD on 1/17/2022 at 1:37 PM

## 2022-01-17 NOTE — OP NOTE
Department of Radiology  Post Procedure Progress Note      Pre-Procedure Diagnosis:  Right leg pain and wounds    Procedure Performed:  RLE arteriogram with angioplasty    Anesthesia: local / versed and fentanyl    Findings: successful    Immediate Complications:  None    Estimated Blood Loss: minimal    SEE DICTATED PROCEDURE NOTE FOR COMPLETE DETAILS.     Electronically signed by Sunil Freeman MD on 1/17/2022 at 3:06 PM

## 2022-01-17 NOTE — H&P
[address] [address]  Sedation/Analgesia History & Physical    Pt Name: Germaine Akhtar  MRN: [id number]  YOB: 1960  Provider Performing Procedure: Shilpa Lagunas MD, MD  Primary Care Physician: No primary care provider on file. PRE-PROCEDURE   DNR-CCA/DNR-CC []Yes [x]No  Brief History/Pre-Procedure Diagnosis: RLE ischemia          MEDICAL HISTORY  []CAD/Valve  []Liver Disease  []Lung Disease []Diabetes  []Hypertension []Renal Disease  []Additional information:       has a past medical history of Arthritis and Substance induced mood disorder ([facility] [address] SURGICAL HISTORY   has a past surgical history that includes Leg Surgery.   Additional information:       ALLERGIES   Allergies as of 01/10/2022    (No Known Allergies)     Additional information:       MEDICATIONS   Coumadin Use Last 5 Days [x]No []Yes  Antiplatelet drug therapy use last 5 days  [x]No []Yes  Other anticoagulant use last 5 days  []No [x]Yes    Current Facility-Administered Medications:     nitroglycerin (NITRO-BID) 2 % ointment 0.5 inch, 0.5 inch, Topical, Daily, Vinnie Romero DPMEHRAN, 0.5 inch at 01/17/22 0936    melatonin tablet 6 mg, 6 mg, Oral, Nightly PRN, ZENAIDA Eller, 6 mg at 01/16/22 2030    hydrOXYzine (ATARAX) tablet 25 mg, 25 mg, Oral, Nightly PRN, ZENAIDA Eller, 25 mg at 01/16/22 2030    piperacillin-tazobactam (ZOSYN) 3,375 mg in dextrose 5 % 50 mL IVPB extended infusion (mini-bag), 3,375 mg, IntraVENous, Q8H, Froylan Nam MD, Last Rate: 12.5 mL/hr at 01/17/22 1022, 3,375 mg at 01/17/22 1022    nicotine (NICODERM CQ) 14 MG/24HR 1 patch, 1 patch, TransDERmal, Daily, ZENAIDA Eller, 1 patch at 01/13/22 1629    haloperidol lactate (HALDOL) injection 1 mg, 1 mg, IntraMUSCular, Q6H PRN, Rachelle Schmitz MD    0.9 % sodium chloride infusion, , IntraVENous, Continuous, Amie Call MD, Last Rate: 75 mL/hr at 01/17/22 0418, Rate Verify at 01/17/22 0418    HYDROcodone-acetaminophen (NORCO) 5-325 MG per tablet 1 tablet, 1 tablet, Oral, Q6H PRN, Daryle Bee, MD, 1 tablet at 01/17/22 0807    phosphorus replacement protocol, , Other, RX Placeholder, Edwin Varghese MD    calcium replacement protocol, , Other, RX Placeholder, Froylan Hu MD    povidone-iodine (BETADINE) 10 % external solution, , Topical, PRN, Matt Gan MD, Given at 01/12/22 0441    sodium chloride flush 0.9 % injection 5-40 mL, 5-40 mL, IntraVENous, 2 times per day, Daryle Bee, MD, 10 mL at 01/16/22 0915    sodium chloride flush 0.9 % injection 5-40 mL, 5-40 mL, IntraVENous, PRN, Daryle Bee, MD    0.9 % sodium chloride infusion, 25 mL, IntraVENous, PRN, Daryle Bee, MD    enoxaparin (LOVENOX) injection 40 mg, 40 mg, SubCUTAneous, Q24H, Daryle Bee, MD, 40 mg at 01/16/22 2029    ondansetron (ZOFRAN-ODT) disintegrating tablet 4 mg, 4 mg, Oral, Q8H PRN **OR** ondansetron (ZOFRAN) injection 4 mg, 4 mg, IntraVENous, Q6H PRN, Daryle Bee, MD, 4 mg at 01/13/22 2324    polyethylene glycol (GLYCOLAX) packet 17 g, 17 g, Oral, Daily PRN, Daryle Bee, MD    acetaminophen (TYLENOL) tablet 650 mg, 650 mg, Oral, Q6H PRN, 650 mg at 01/12/22 2100 **OR** acetaminophen (TYLENOL) suppository 650 mg, 650 mg, Rectal, Q6H PRN, Daryle Bee, MD    thiamine tablet 100 mg, 100 mg, Oral, Daily, Daryle Bee, MD, 100 mg at 01/16/22 1265    multivitamin 1 tablet, 1 tablet, Oral, Daily, Daryle Bee, MD, 1 tablet at 01/16/22 0912    potassium chloride (KLOR-CON M) extended release tablet 40 mEq, 40 mEq, Oral, PRN, 40 mEq at 01/13/22 1815 **OR** potassium bicarb-citric acid (EFFER-K) effervescent tablet 40 mEq, 40 mEq, Oral, PRN **OR** potassium chloride 10 mEq/100 mL IVPB (Peripheral Line), 10 mEq, IntraVENous, PRN, Daryle Bee, MD, Last Rate: 100 mL/hr at 01/17/22 1218, 10 mEq at 01/17/22 1218    magnesium sulfate 2000 mg in 50 mL IVPB premix, 2,000 mg, IntraVENous, PRN, Daryle Bee, MD, Stopped at 01/13/22 1022    LORazepam (ATIVAN) tablet 1 mg, 1 mg, Oral, Q1H PRN, 1 mg at 01/14/22 0200 **OR** LORazepam (ATIVAN) injection 1 mg, 1 mg, IntraVENous, Q1H PRN **OR** LORazepam (ATIVAN) tablet 2 mg, 2 mg, Oral, Q1H PRN **OR** LORazepam (ATIVAN) injection 2 mg, 2 mg, IntraVENous, Q1H PRN, 2 mg at 01/12/22 0239 **OR** LORazepam (ATIVAN) tablet 3 mg, 3 mg, Oral, Q1H PRN **OR** LORazepam (ATIVAN) injection 3 mg, 3 mg, IntraVENous, Q1H PRN **OR** LORazepam (ATIVAN) tablet 4 mg, 4 mg, Oral, Q1H PRN **OR** LORazepam (ATIVAN) injection 4 mg, 4 mg, IntraVENous, Q1H PRN, Trini Car MD  Prior to Admission medications    Not on File     Additional information:       VITAL SIGNS   Vitals:    01/17/22 1335   BP: (!) 144/65   Pulse: 76   Resp: 14   Temp:    SpO2: 98%       PHYSICAL:   Heart:  [x]Regular rate and rhythm  []Other:    Lungs:  [x]Clear    []Other:    Abdomen: [x]Soft    []Other:    Mental Status: [x]Alert & Oriented  []Other:      PLANNED PROCEDURE   []Biospy [x]Arteriogram              []Drainage   []Mediport Insertion  []Fistulogram []IV access       []Vertebroplasty / Augmentation  []IVC filter []Dialysis catheter []Biliary drainage  []Other: []CAPD Catheter []Nephrostomy Tube / Stent  SEDATION  Planned agent:[x]Midazolam []Meperidine [x]Sublimaze []Dilaudid []Morphine     []Diazepam  []Other:     ASA Classification:  []1 [x]2 []3 []4 []5  Class 1: A normal healthy patient  Class 2: Pt with mild to moderate systemic disease  Class 3: Severe systemic disease or disturbance  Class 4: Severe systemic disorders that are already life threatening. Class 5: Moribund pt with little chances of survival, for more than 24 hours. Mallampati I Airway Classification:   []1 [x]2 []3 []4    [x]Pre-procedure diagnostic studies complete and results available. Comment:    [x]Previous sedation/anesthesia experiences assessed.    Comment:    [x]The patient is an appropriate candidate to undergo the planned procedure sedation and anesthesia. (Refer to nursing sedation/analgesia documentation record)  [x]Formulation and discussion of sedation/procedure plan, risks, and expectations with patient and/or responsible adult completed. [x]Patient examined immediately prior to the procedure.  (Refer to nursing sedation/analgesia documentation record)    Vangie Suarez MD, MD  Electronically signed 1/17/2022 at 1:37 PM

## 2022-01-17 NOTE — PROGRESS NOTES
HOSPITALIST PROGRESS NOTE    Patient:  Atiya Ramos    Unit/Bed:7K-27/027-A  YOB: 1960  MRN: [id number]   PCP: No primary care provider on file. Date of Admission: 1/10/2022  Date of Service: 1/17/2022  Chief Complaint:- Foot pain    Assessment and Plan:  1. Bilateral LE Ulcers/Eschar/?Frostbite on RLE: Patient presents with what appears to be frostbite of the right foot as well as a full-thickness ulceration of the left foot. Patient is afebrile however leukocytosis was present on arrival.  All digits of the right foot appear necrotic, foul-smelling and gangrenous which extends to the metatarsal level. The left foot has an open ulcer on the dorsal aspect near the second digit however does not appear to be necrotic appearing as the right foot. CTA of the abdominal aorta with bilateral runoff shows an area of significant narrowing in the distal right superficial femoral artery with what appears to be relatively normal flow in the left LE circulation. Podiatry consulted and performing daily wound care/Betadine debridement. · MRI of the left foot done 1/13/2022 reveals what appears to be osteomyelitis in the first digit and metatarsal. Soft tissue gas was also noted. As per podiatry, plan for eventual right TMA after interventional radiology performs angiogram with possible intervention. Patient scheduled to undergo aortofemoral angiogram with possible intervention today 1/17/2022 in the afternoon. · Continue with broad-spectrum antibiotics with Zosyn started 1/10/2022. Wound cultures show growth of MSSA - Vancocin discontinued. 2. History of EtOH Abuse: Patient no longer agitated as seen in alcohol abuse. 3. Mild Rhabdomyolysis: We will repeat CK to confirm resolution. 4. Acute on Chronic ? Blood Loss Mixed Macrocytic and Microcytic Anemia: CBC panel reveals anemia seems to be multifactorial due to elevated MCV with high RDW.   Reticulocyte count done shows an inappropriately normal level.  Suspect erythropoietic bone marrow suppression as a consequence due to chronic EtOH abuse. Iron, iron saturation and TIBC all below normal.  We will transfuse with IV Venofer pending ferritin level. 5. Diffuse Alcoholic Fatty Liver Disease without Cirrhosis: Evidence for acute EtOH toxic insult due to mild transaminitis with AST > ALT. Both now downtrending, indicating recovery from EtOH toxic insult. INR 1.56. Platelet count greater than 150,000. Ultrasound of the liver shows fatty infiltration with concurrent hypoechogenicity, normal color flow via Doppler studies. No CBD dilatation. Patient at risk for developing fibrotic changes with continued drinking and eventual cirrhosis. 6. Severe Hypoalbuminemia: Due to severe malnutrition and chronic EtOH use. · IV albumin x3 doses given on 1/12-1/13/2022   · Dietitian following  7. Moderate Acute on Chronic Asymptomatic Hypovolemic Hyponatremia: Plateaued now nearing baseline. Mild decrease from baseline, current Na 131. Urine Na > 20, Urine osm > 100. · Continue with IV NS.  8. Mild Hyperkalemia: Etiology likely due to poor solute intake along with renal wasting due to hypomagnesemia. Replacement protocols in place for both. 9. Disposition: Patient scheduled to undergo aortofemoral angiogram with possible intervention today by IR. Subjective (Past 24 hour events):  Patient repeatedly asking if he can go out and smoke. Reports pain now on the lower extremities. Undergoing aortofemoral angiogram by IR today. HPI:  21-year-old homeless male with PMH of EtOH and cocaine abuse who came to the ED on 1/10/2022 due to bilateral lower extremity pain and numbness. On arrival patient was noted to be an extremely poor historian. Patient did state that he has had lower extremity numbness and pain for about 2 weeks. Not sure how long he has had ulcers in the foot. Admits to drinking every day, about 6 drinks per day for several years.   Denied any chronic medical problems. Physical exam in the ED showed bilateral lower extremity eschars along with ulcers and discolored toes. Admission labs showed multiple significant abnormalities which included hyponatremia, hypokalemia, hyperbilirubinemia leukocytosis, macrocytic anemia, rhabdomyolysis. Patient admitted for these bilateral ulcers/eschar and multiple lab abnormalities. Please see Assessment and Plan for further details on hospital course. Scheduled Meds:   nitroglycerin  0.5 inch Topical Daily    piperacillin-tazobactam  3,375 mg IntraVENous Q8H    nicotine  1 patch TransDERmal Daily    phosphorus replacement protocol   Other RX Placeholder    calcium replacement protocol   Other RX Placeholder    sodium chloride flush  5-40 mL IntraVENous 2 times per day    enoxaparin  40 mg SubCUTAneous Q24H    thiamine  100 mg Oral Daily    multivitamin  1 tablet Oral Daily     Continuous Infusions:   sodium chloride 75 mL/hr at 01/17/22 0418    sodium chloride         PHYSICAL EXAMINATION:  Blood pressure (!) 142/65, pulse 73, temperature 98.2 °F (36.8 °C), temperature source Oral, resp. rate 23, height 6' (1.829 m), weight 140 lb (63.5 kg), SpO2 94 %. Oxygen Delivery -    General: Acute on chronic ill appearing. Appears disheveled. In no acute distress. HEENT:  normocephalic and atraumatic. No scleral icterus. PERR  Neck: supple. No Thyromegaly. Lungs: clear to auscultation. No retractions  Cardiac: RRR. No JVD. Abdomen: soft. Nontender. Extremities: Open ulceration on the dorsum of the left foot. Right foot from metatarsals to toes are covered in Ace wraps soaked in Betadine. See below for appearance of feet prior to bandaging/wound care. Vasculature: delayed capillary refill. Skin: cool and dry in the LE b/l  Psych:  Alert and oriented x3. Lymph:  No supraclavicular adenopathy. Neurologic:  No focal deficit.  No seizures            Diagnostic Data: (All radiographs, EKGs, PFTs, change since previous study dated 30 January 2020. 2. Abnormal density involving the first metatarsophalangeal joint, unchanged. 3. Degenerative change. 4. No definite evidence of fracture, bony destruction or osteomyelitis. . **This report has been created using voice recognition software. It may contain minor errors which are inherent in voice recognition technology. ** Final report electronically signed by DR Joanie Roca on 1/10/2022 2:00 PM    XR FOOT RIGHT (MIN 3 VIEWS)    Result Date: 1/10/2022   1. Postoperative changes in the distal tibia. 2. Abnormal density involving the neck of the fifth metacarpal. 3. Degenerative changes. 4. Diffuse osteopenia. 5. Soft tissue swelling over the dorsum of foot. . **This report has been created using voice recognition software. It may contain minor errors which are inherent in voice recognition technology. ** Final report electronically signed by DR Joanie Roca on 1/10/2022 2:03 PM    CTA ABDOMINAL AORTA W BILAT RUNOFF W WO CONTRAST    Result Date: 1/10/2022  1. Limited evaluation of the right leg secondary to patient motion artifact. 2. Relatively normal flow in the abdominal aorta, visceral arteries, right and left common, internal and external iliac arteries common femoral arteries. 3. There is an area of significant narrowing in the distal right superficial femoral artery. 4. There is relatively normal flow in the left common and superficial femoral, popliteal, anterior and posterior tibial arteries. 5. Intramedullary jaleesa in the right tibia-fibula. 6. Mild diffuse fatty replacement in the liver. 7. Punctate calcifications in spleen. 8. Small bilateral adrenal nodules. 9. Atrophic pancreas. Dilated pancreatic duct. 10. 2.2 cm fluid collection in the region of the splenic hilum. 11. Enlarged prostate gland. **This report has been created using voice recognition software. It may contain minor errors which are inherent in voice recognition technology. ** Final report electronically signed by DR Kathryn Thomas on 1/10/2022 9:10 PM    Electronically signed by Bryn Reid M.D. on 1/17/2022  PGY-2 Internal Medicine Resident

## 2022-01-17 NOTE — PROGRESS NOTES
Patient returned to [phone]. Patient alert and oriented x 4. RN instructed patient to lay flat for 1-2 hours. angioseal present on left groin remains dry and intact. Sitter present at bedside. VS and Assessment documented.

## 2022-01-17 NOTE — CARE COORDINATION
Discharge Planning Update Note:   Angiogram today to determine level of amputation. Homeless. SW on case for discharge planning.

## 2022-01-17 NOTE — PROGRESS NOTES
Podiatric Progress Note  [facility]  Subjective :   1/17/2022  Patient seen at bedside this morning on behalf of Dr. Mammie Gottron. Patient is alert and oriented to person, place, and time. He is not in acute distress. Patient states that overall, he is feeling good. He endorses pain to his bilateral lower extremities, and rates the pain as 10/10. Prior to today, patient denied any pain in bilateral lower extremities and stated that he could not feel anything that is bilateral lower extremities were constantly numb. Patient asked if he could be allowed outside to smoke, as he states that the nicotine replacement therapy is not working well for him. Patient would like to know when his right foot will be amputated. Otherwise, he denies any N/V/F/C/SOB/CP or bilateral calf tenderness. He has no other pedal complaints at this time. 1/16/2022  Patient seen at bedside today on behalf of Dr. Mammie Gottron. Patient is alert and oriented to person, place, and time. Patient is extremely displeased that he is still in the hospital. He would like to be able to go smoke. He wants his right foot amputated, and is unsure why the amputation is being delayed. He denies any nausea, vomiting, fever, chills, chest pain, shortness of breath. No other pedal complaints. 1/15/2022  Patient seen at bedside today on behalf of Dr. Mammie Gottron. Patient alert and oriented. Patient continues to deny any pain to bilateral feet. He also denies any N/V/F/C/SOB/CP. That he is still at high risk for losing the digits of the right foot. Otherwise no new complaints. 1/14/2022  Patient seen at bedside today on behalf of Dr. Mammie Gottron. Patient alert and oriented. Patient continues to deny any pain to bilateral feet. He also denies any N/V/F/C/SOB/CP or bilateral calf tenderness.   Patient inquired as to which of his toes he may be a losing; reiterated to patient that he has frostbite involving all 5 digits on the right foot, and that he is going to lose all 5 digits of the right foot. Otherwise, patient has no other pedal complaints at this time. 1/13/2022  Patient seen at bedside today on behalf of Dr. Stanley Stone. Patient was somnolent during encounter; Per sitter, patient has been sleepy all day. Patient denies any pain to bilateral feet. He currently denies any N/V/F/C/SOB/CP or bilateral calf tenderness. He has no new pedal complaints at this time. 1/12/2022  Patient seen at bedside today alongside Dr. Stanley Stone. Patient appeared pleasant, was oriented to person, place and time and in no acute distress. Patient denies any pain to either of his feet. Patient also denies any N/V/F/C/SOB or CP. Patient has no further pedal concerns at this point in time. HPI:  The patient is a homeless 63 y.o. male with significant past medical history of substance abuse (cocaine use, alcohol) tobacco use, and incarcerated left inguinal hernia who being seen at bedside today on behalf of Dr. Stanley Stone. Patient was admitted yesterday via [place] [id number] ED for frostbite, foot wound, and lab abnormalities. Patient is pleasant, but very poor historian. Patient does not know how long he has had frostbite changes to his right foot nor the wound on his left foot. He thinks they must have been there for several weeks. He relates that his feet have been hurting for several weeks; however when asked by RN prior to getting pain medication, patient stated he cannot feel any pain in his lower legs as they are numb all the time and all of his pain is from his lower back. Patient admits drinking approximately 6 drinks per day daily for the last several years. He currently denies any N/V/F/C/SOB/CP or bilateral calf tenderness. He has no other pedal complaints at this time.     Current Medications:    Current Facility-Administered Medications: nitroglycerin (NITRO-BID) 2 % ointment 0.5 inch, 0.5 inch, Topical, Daily  melatonin tablet 6 mg, 6 mg, Oral, Nightly PRN  hydrOXYzine (ATARAX) tablet 25 mg, 25 mg, Oral, Nightly PRN  piperacillin-tazobactam (ZOSYN) 3,375 mg in dextrose 5 % 50 mL IVPB extended infusion (mini-bag), 3,375 mg, IntraVENous, Q8H  nicotine (NICODERM CQ) 14 MG/24HR 1 patch, 1 patch, TransDERmal, Daily  haloperidol lactate (HALDOL) injection 1 mg, 1 mg, IntraMUSCular, Q6H PRN  0.9 % sodium chloride infusion, , IntraVENous, Continuous  HYDROcodone-acetaminophen (NORCO) 5-325 MG per tablet 1 tablet, 1 tablet, Oral, Q6H PRN  phosphorus replacement protocol, , Other, RX Placeholder  calcium replacement protocol, , Other, RX Placeholder  povidone-iodine (BETADINE) 10 % external solution, , Topical, PRN  sodium chloride flush 0.9 % injection 5-40 mL, 5-40 mL, IntraVENous, 2 times per day  sodium chloride flush 0.9 % injection 5-40 mL, 5-40 mL, IntraVENous, PRN  0.9 % sodium chloride infusion, 25 mL, IntraVENous, PRN  enoxaparin (LOVENOX) injection 40 mg, 40 mg, SubCUTAneous, Q24H  ondansetron (ZOFRAN-ODT) disintegrating tablet 4 mg, 4 mg, Oral, Q8H PRN **OR** ondansetron (ZOFRAN) injection 4 mg, 4 mg, IntraVENous, Q6H PRN  polyethylene glycol (GLYCOLAX) packet 17 g, 17 g, Oral, Daily PRN  acetaminophen (TYLENOL) tablet 650 mg, 650 mg, Oral, Q6H PRN **OR** acetaminophen (TYLENOL) suppository 650 mg, 650 mg, Rectal, Q6H PRN  thiamine tablet 100 mg, 100 mg, Oral, Daily  multivitamin 1 tablet, 1 tablet, Oral, Daily  potassium chloride (KLOR-CON M) extended release tablet 40 mEq, 40 mEq, Oral, PRN **OR** potassium bicarb-citric acid (EFFER-K) effervescent tablet 40 mEq, 40 mEq, Oral, PRN **OR** potassium chloride 10 mEq/100 mL IVPB (Peripheral Line), 10 mEq, IntraVENous, PRN  magnesium sulfate 2000 mg in 50 mL IVPB premix, 2,000 mg, IntraVENous, PRN  LORazepam (ATIVAN) tablet 1 mg, 1 mg, Oral, Q1H PRN **OR** LORazepam (ATIVAN) injection 1 mg, 1 mg, IntraVENous, Q1H PRN **OR** LORazepam (ATIVAN) tablet 2 mg, 2 mg, Oral, Q1H PRN **OR** LORazepam (ATIVAN) injection 2 mg, 2 mg, IntraVENous, Q1H PRN **OR** LORazepam (ATIVAN) tablet 3 mg, 3 mg, Oral, Q1H PRN **OR** LORazepam (ATIVAN) injection 3 mg, 3 mg, IntraVENous, Q1H PRN **OR** LORazepam (ATIVAN) tablet 4 mg, 4 mg, Oral, Q1H PRN **OR** LORazepam (ATIVAN) injection 4 mg, 4 mg, IntraVENous, Q1H PRN    Objective     /67   Pulse 72   Temp 98.2 °F (36.8 °C) (Oral)   Resp 18   Ht 6' (1.829 m)   Wt 140 lb (63.5 kg)   SpO2 95%   BMI 18.99 kg/m²      I/O:    Intake/Output Summary (Last 24 hours) at 1/17/2022 1005  Last data filed at 1/17/2022 0418  Gross per 24 hour   Intake 2007.68 ml   Output 850 ml   Net 1157.68 ml              Wt Readings from Last 3 Encounters:   01/10/22 140 lb (63.5 kg)   07/06/21 150 lb (68 kg)   05/27/21 160 lb (72.6 kg)       LABS:    Recent Labs     01/15/22  0541 01/17/22  0455   WBC 4.8 3.6*   HGB 10.0* 10.0*   HCT 29.9* 29.6*    178        Recent Labs     01/15/22  0541 01/15/22  0541 01/17/22  0455   *   < > 131*   K 2.9*   < > 2.8*   CL 99   < > 100   CO2 20*   < > 20*   PHOS 2.6  --   --    BUN 7   < > 6*   CREATININE 0.6   < > 0.5    < > = values in this interval not displayed. Recent Labs     01/15/22  0541   PROT 5.6*        No results for input(s): CKTOTAL, CKMB, CKMBINDEX, TROPONINI in the last 72 hours. Focused Lower Extremity Examination:    Vitals:    /67   Pulse 72   Temp 98.2 °F (36.8 °C) (Oral)   Resp 18   Ht 6' (1.829 m)   Wt 140 lb (63.5 kg)   SpO2 95%   BMI 18.99 kg/m²      Dermatologic: Dressings to BLE intact. There is a full-thickness ulceration noted to the dorsal aspect of left foot, between metatarsal necks 1 and 2. Wound bed with fibro-granular tissue with small areas of infarcted tissue. There is very scant serous drainage noted. No purulence or malodor. Wound appears stable at this time.  Superficial thickness ulceration noted to distal aspect of left second digit; left second digit is mildly erythematous and edematous; erythema and edema consistent with previous. No drainage or malodor noted to superficial ulcer on the distal left second digit. On the right, there are extensive ischemic and necrotic changes to all 5 digits extending proximally to the distal metatarsal level. There is a deroofed blister noted to the dorsum of the right distal forefoot. There are superficial thickness ulcerations on the dorsal medial aspect of the right hallux and the distal tip of the left fifth digit. Digits 1 through 5 of the distal right forefoot are beginning to harden and turn very dark in coloration. There is increasing duskiness to the rest of the digits extending toward the metatarsophalangeal joints on the right foot. Some bogginess to distal-lateral aspect of patient's right foot in area of de-roofed blister. There is slight malodor noted to the right forefoot. Erythema present on the proximal aspect of frostbitten tissue of right foot.     Vascular: Dorsalis pedis and posterior tibial pulses are palpable bilaterally; DP and PT pulses are diminished to the RLE. Skin temperature is warm to warm from proximal tibial tuberosity to distal digits of left foot; skin temperature is warm to  cold from proximal tibial tuberosity to distal forefoot and digits of right foot. CFT within normal limits to all 5 digits of left foot; CFT absent to distal tips of all 5 digits of right foot. Edema is present to RLE. Pedal hair is absent bilaterally.     Neurovascular: Light touch sensation grossly diminished to the midfoot bilaterally.      Musculoskeletal: Muscle strength 4/5 and symmetric for all muscle groups crossing the ankle joint bilaterally. Decreased ROM noted at the ankle and ST joints bilaterally. No pain with palpation of any foot or ankle structures. Foot and ankle grossly rectus alignment bilaterally. STUDIES:  XR, right foot  Impression       1. Postoperative changes in the distal tibia.    2. Abnormal density involving the neck of the fifth metacarpal.   3. Degenerative changes. 4. Diffuse osteopenia. 5. Soft tissue swelling over the dorsum of foot. .                   **This report has been created using voice recognition software. It may contain minor errors which are inherent in voice recognition technology. **       Final report electronically signed by DR Rajan Douglas on 1/10/2022 2:03 PM      XR, left foot  Impression       1. Stable left foot radiographs, no interval change since previous study dated 30 January 2020.   2. Abnormal density involving the first metatarsophalangeal joint, unchanged. 3. Degenerative change. 4. No definite evidence of fracture, bony destruction or osteomyelitis. .                   **This report has been created using voice recognition software. It may contain minor errors which are inherent in voice recognition technology. **       Final report electronically signed by DR Rajan Douglas on 1/10/2022 2:00 PM      CTA, abdominal aorta with bilateral runoff  Impression       1. Limited evaluation of the right leg secondary to patient motion artifact. 2. Relatively normal flow in the abdominal aorta, visceral arteries, right and left common, internal and external iliac arteries common femoral arteries. 3. There is an area of significant narrowing in the distal right superficial femoral artery. 4. There is relatively normal flow in the left common and superficial femoral, popliteal, anterior and posterior tibial arteries. 5. Intramedullary jaleesa in the right tibia-fibula. 6. Mild diffuse fatty replacement in the liver. 7. Punctate calcifications in spleen. 8. Small bilateral adrenal nodules. 9. Atrophic pancreas. Dilated pancreatic duct. 10. 2.2 cm fluid collection in the region of the splenic hilum. 11. Enlarged prostate gland.           **This report has been created using voice recognition software. It may contain minor errors which are inherent in voice recognition technology. **     Final report electronically signed by DR Elissa Schulz on 1/10/2022 9:10 PM     MRI, left foot  Impression       1. The soft tissue ulcer appears to approximate the proximal phalanx of the first digit. Soft tissue gas is noted. Osteomyelitis cannot be excluded.       2. Increased bone marrow signal on STIR without corresponding T1 hypointensity in the first metatarsal is a nonspecific findings. Likely relates to reactive edema at this time. The finding is equivocal for osteomyelitis.       3. Linear hyperintensity and STIR in the base of the proximal phalanx of the fifth digit without corresponding T1 hypointensity likely relates to reactive bone marrow edema versus a stress reaction. Clinical correlation is recommended.       4. Marked degenerative changes of the first metatarsophalangeal joint. Hallux valgus deformity.               **This report has been created using voice recognition software.  It may contain minor errors which are inherent in voice recognition technology. **       Final report electronically signed by Dr Lorna Barrett on 1/13/2022 11:52 AM     Lower extremity arterial Doppler, bilateral  Impression   1. Normal findings in the left leg. 2. Abnormal findings in the right leg,. Findings suggestive of mild stenosis at the origin of the profunda and possibly at the origin right SFA. Total occlusion mid right SFA with distal collateral reconstitution. Also evidence for trifurcation disease    right side. 3. Aortofemoral angiography is recommended for further evaluation with possible intervention. If desired, this can be arranged through the interventional scheduling desk of [facility]'s radiology department ([phone]).             **This report has been created using voice recognition software.  It may contain minor errors which are inherent in voice recognition technology. **       Final report electronically signed by Dr. Spike Ibrahim on 1/14/2022 11:14 AM       ASSESSMENT: Pt. is a 63 y.o. male with:  Principle  Frostbite, right foot  Full-thickness ulceration, left foot    Chronic  Patient Active Problem List   Diagnosis    Incarcerated left inguinal hernia    Tobacco dependence    Substance induced mood disorder ([facility] [address]    Foot pain, bilateral    Wound infection       PLAN:   Frostbite, right foot  Full-thickness ulceration, left foot  -Patient examined and evaluated. -WBC 3.6; patient afebrile  -Blood cultures 1 and 2, preliminary results: No growth  -Swab cultures results: Staphylococcus aureus and Pseudomonas aeruginosa  -Continue IV zosyn per primary  -Reviewed MRI; impression above. Results equivocal for osteomyelitis. No definite changes consistent with osteomyelitis were noted. -Reviewed lower extremity arterial Doppler; impression above  -Interventional radiology consulted for possible intervention; procedure likely to be performed today  -Applied nitroglycerin paste to right forefoot, Betadine wet-to-dry to distal right forefoot; wrapped loosely with Kerlix, betadine to wounds of left foot with loosely applied kerlix.  -Patient okay to weight-bear as tolerated while wearing the postop shoe; encouraged patient to minimize weightbearing  -Placed order for daily dressing changes with nitroglycerin paste to the dorsal aspect of the foot bilaterally, Betadine wet-to-dry to distal right forefoot and wound to dorsum of left forefoot wrapped with Kerlix rolls.  -Discussed with patient that it is dangerous to perform amputation before demarcation of skin. There is significant risk that amputation margins will become necrotic and that he will require more proximal amputation  -Surgical planning will be pended until intervention is performed and response to intervention is visualized. -Patient verbalized understanding and agreement with the treatment plan as stated. All of his questions were answered to satisfaction.     DISPO: Pending results of intervention by interventional radiology the right lower extremity, demarcation of ischemic changes on the right foot, eventual need for amputation.     Dyan Ariza DPM, PGY-2  Podiatric Surgical Resident  1/17/2022   10:05 AM

## 2022-01-17 NOTE — PROGRESS NOTES
Comprehensive Nutrition Assessment    Type and Reason for Visit:  Reassess    Nutrition Recommendations/Plan:   Recommend resume diet post- procedure, will order ONS as appropriate: Ensure Compact TID and Percy BID  Agree with vitamin supplementation with history of alcohol abuse    Nutrition Assessment:    Pt. nutritionally compromised AEB po intake 75% or less of meals since admit (7 days) and foot wounds. At risk for further nutrition compromise r/t increased nutrient needs for wound healing, underlying medical condition (hx alcohol and cocaine abuse). Nutrition recommendations/interventions as per above. Malnutrition Assessment:  Malnutrition Status:  Insufficient data (1/12/22: unable to assess- patient reports \"catching up on sleep\")      Estimated Daily Nutrient Needs:  Energy (kcal):  4667-1510 kcals (30-35); Weight Used for Energy Requirements:   (63.5kgm on 1/10)     Protein (g):   grams (1.5-2); Weight Used for Protein Requirements:   (63.5kgm on 1/10)          Nutrition Related Findings:     Patient off unit at present for angiogram, note plan for eventual right TMA after IR performs angiogram with possible intervention, sitter reports patient asking earlier today to eat, has been NPO since midnight for angiogram, note intake 75% or less of meals per meal graphics, BM x2 past 24 hours  Rx includes MVI, thiamine, zosyn, electrolyte replacement  Sodium 131, Potassium 2.8, BUN 6, Creatinine 0.5, Glucose 93    Wounds:   (right foot: frostbite; left foot: full thickness ulceration)       Current Nutrition Therapies:    Diet NPO Exceptions are: Sips of Water with Meds    Anthropometric Measures:  · Height: 6' (182.9 cm)  · Current Body Weight: 140 lb (63.5 kg)   · Admission Body Weight: 140 lb (63.5 kg) (1/10; +2,+3 LE edema)    · Usual Body Weight:  (7/6/21 stated 150# per EMR)     · Ideal Body Weight: 178 lbs;   · BMI: 19  · BMI Categories: Normal Weight (BMI 18.5-24. 9)       Nutrition Diagnosis:   · Increased nutrient needs related to increase demand for energy/nutrients as evidenced by wounds      Nutrition Interventions:   Food and/or Nutrient Delivery:   (restart ONS when diet restarted following procedure)  Nutrition Education/Counseling:  Education not appropriate   Coordination of Nutrition Care:  Continue to monitor while inpatient    Goals:  Patient will consume 75% or more of meals to aid in wound healing during LOS       Nutrition Monitoring and Evaluation:   Behavioral-Environmental Outcomes:  None Identified   Food/Nutrient Intake Outcomes:  Food and Nutrient Intake,Supplement Intake  Physical Signs/Symptoms Outcomes:  Biochemical Data,Fluid Status or Edema,Meal Time Behavior,Nutrition Focused Physical Findings,Skin,Weight,GI Status     Discharge Planning:     Too soon to determine     Electronically signed by Justin Carrizales RD, LD on 1/17/22 at 3:22 PM EST    Contact: [phone]

## 2022-01-18 LAB
ANION GAP SERPL CALCULATED.3IONS-SCNC: 10 MEQ/L (ref 8–16)
ATYPICAL LYMPHOCYTES: ABNORMAL %
BASOPHILIA: ABNORMAL
BASOPHILS # BLD: 1.7 %
BASOPHILS ABSOLUTE: 0.1 THOU/MM3 (ref 0–0.1)
BUN BLDV-MCNC: 4 MG/DL (ref 7–22)
CALCIUM SERPL-MCNC: 7.8 MG/DL (ref 8.5–10.5)
CHLORIDE BLD-SCNC: 100 MEQ/L (ref 98–111)
CO2: 21 MEQ/L (ref 23–33)
CREAT SERPL-MCNC: 0.5 MG/DL (ref 0.4–1.2)
EOSINOPHIL # BLD: 3.6 %
EOSINOPHILS ABSOLUTE: 0.1 THOU/MM3 (ref 0–0.4)
ERYTHROCYTE [DISTWIDTH] IN BLOOD BY AUTOMATED COUNT: 14.4 % (ref 11.5–14.5)
ERYTHROCYTE [DISTWIDTH] IN BLOOD BY AUTOMATED COUNT: 54.2 FL (ref 35–45)
GFR SERPL CREATININE-BSD FRML MDRD: > 90 ML/MIN/1.73M2
GLUCOSE BLD-MCNC: 107 MG/DL (ref 70–108)
HCT VFR BLD CALC: 27.8 % (ref 42–52)
HEMOGLOBIN: 9.4 GM/DL (ref 14–18)
IMMATURE GRANS (ABS): 0.05 THOU/MM3 (ref 0–0.07)
IMMATURE GRANULOCYTES: 1.7 %
LYMPHOCYTES # BLD: 24.4 %
LYMPHOCYTES ABSOLUTE: 0.7 THOU/MM3 (ref 1–4.8)
MCH RBC QN AUTO: 34.4 PG (ref 26–33)
MCHC RBC AUTO-ENTMCNC: 33.8 GM/DL (ref 32.2–35.5)
MCV RBC AUTO: 101.8 FL (ref 80–94)
MONOCYTES # BLD: 11.2 %
MONOCYTES ABSOLUTE: 0.3 THOU/MM3 (ref 0.4–1.3)
NUCLEATED RED BLOOD CELLS: 0 /100 WBC
PLATELET # BLD: 161 THOU/MM3 (ref 130–400)
PLATELET ESTIMATE: ADEQUATE
PMV BLD AUTO: 10.4 FL (ref 9.4–12.4)
POTASSIUM SERPL-SCNC: 3.3 MEQ/L (ref 3.5–5.2)
RBC # BLD: 2.73 MILL/MM3 (ref 4.7–6.1)
SCAN OF BLOOD SMEAR: NORMAL
SEG NEUTROPHILS: 57.4 %
SEGMENTED NEUTROPHILS ABSOLUTE COUNT: 1.7 THOU/MM3 (ref 1.8–7.7)
SODIUM BLD-SCNC: 131 MEQ/L (ref 135–145)
TOTAL CK: 75 U/L (ref 55–170)
WBC # BLD: 3 THOU/MM3 (ref 4.8–10.8)

## 2022-01-18 PROCEDURE — 6360000002 HC RX W HCPCS: Performed by: INTERNAL MEDICINE

## 2022-01-18 PROCEDURE — 6370000000 HC RX 637 (ALT 250 FOR IP): Performed by: STUDENT IN AN ORGANIZED HEALTH CARE EDUCATION/TRAINING PROGRAM

## 2022-01-18 PROCEDURE — 80048 BASIC METABOLIC PNL TOTAL CA: CPT

## 2022-01-18 PROCEDURE — 99233 SBSQ HOSP IP/OBS HIGH 50: CPT | Performed by: HOSPITALIST

## 2022-01-18 PROCEDURE — 36415 COLL VENOUS BLD VENIPUNCTURE: CPT

## 2022-01-18 PROCEDURE — 6370000000 HC RX 637 (ALT 250 FOR IP): Performed by: PHYSICIAN ASSISTANT

## 2022-01-18 PROCEDURE — 1200000000 HC SEMI PRIVATE

## 2022-01-18 PROCEDURE — 2580000003 HC RX 258: Performed by: STUDENT IN AN ORGANIZED HEALTH CARE EDUCATION/TRAINING PROGRAM

## 2022-01-18 PROCEDURE — 6370000000 HC RX 637 (ALT 250 FOR IP): Performed by: RADIOLOGY

## 2022-01-18 PROCEDURE — 2580000003 HC RX 258: Performed by: HOSPITALIST

## 2022-01-18 PROCEDURE — 82550 ASSAY OF CK (CPK): CPT

## 2022-01-18 PROCEDURE — 6360000002 HC RX W HCPCS: Performed by: STUDENT IN AN ORGANIZED HEALTH CARE EDUCATION/TRAINING PROGRAM

## 2022-01-18 PROCEDURE — 85025 COMPLETE CBC W/AUTO DIFF WBC: CPT

## 2022-01-18 PROCEDURE — 6370000000 HC RX 637 (ALT 250 FOR IP): Performed by: INTERNAL MEDICINE

## 2022-01-18 RX ORDER — METOPROLOL TARTRATE 5 MG/5ML
5 INJECTION INTRAVENOUS EVERY 6 HOURS PRN
Status: DISCONTINUED | OUTPATIENT
Start: 2022-01-18 | End: 2022-01-24 | Stop reason: HOSPADM

## 2022-01-18 RX ORDER — KETOROLAC TROMETHAMINE 30 MG/ML
15 INJECTION, SOLUTION INTRAMUSCULAR; INTRAVENOUS EVERY 6 HOURS PRN
Status: DISPENSED | OUTPATIENT
Start: 2022-01-18 | End: 2022-01-23

## 2022-01-18 RX ADMIN — POTASSIUM CHLORIDE 40 MEQ: 1500 TABLET, EXTENDED RELEASE ORAL at 05:37

## 2022-01-18 RX ADMIN — PIPERACILLIN AND TAZOBACTAM 3375 MG: 3; .375 INJECTION, POWDER, LYOPHILIZED, FOR SOLUTION INTRAVENOUS at 09:34

## 2022-01-18 RX ADMIN — HYDROCODONE BITARTRATE AND ACETAMINOPHEN 1 TABLET: 5; 325 TABLET ORAL at 03:06

## 2022-01-18 RX ADMIN — KETOROLAC TROMETHAMINE 15 MG: 30 INJECTION, SOLUTION INTRAMUSCULAR; INTRAVENOUS at 20:11

## 2022-01-18 RX ADMIN — ENOXAPARIN SODIUM 40 MG: 100 INJECTION SUBCUTANEOUS at 20:10

## 2022-01-18 RX ADMIN — SODIUM CHLORIDE: 9 INJECTION, SOLUTION INTRAVENOUS at 17:33

## 2022-01-18 RX ADMIN — CLOPIDOGREL BISULFATE 75 MG: 75 TABLET, FILM COATED ORAL at 09:32

## 2022-01-18 RX ADMIN — SODIUM CHLORIDE: 9 INJECTION, SOLUTION INTRAVENOUS at 05:37

## 2022-01-18 RX ADMIN — KETOROLAC TROMETHAMINE 15 MG: 30 INJECTION, SOLUTION INTRAMUSCULAR; INTRAVENOUS at 11:38

## 2022-01-18 RX ADMIN — PIPERACILLIN AND TAZOBACTAM 3375 MG: 3; .375 INJECTION, POWDER, LYOPHILIZED, FOR SOLUTION INTRAVENOUS at 17:30

## 2022-01-18 RX ADMIN — Medication 6 MG: at 20:10

## 2022-01-18 RX ADMIN — HYDROCODONE BITARTRATE AND ACETAMINOPHEN 1 TABLET: 5; 325 TABLET ORAL at 22:19

## 2022-01-18 RX ADMIN — PIPERACILLIN AND TAZOBACTAM 3375 MG: 3; .375 INJECTION, POWDER, LYOPHILIZED, FOR SOLUTION INTRAVENOUS at 00:07

## 2022-01-18 RX ADMIN — HYDROXYZINE HYDROCHLORIDE 25 MG: 25 TABLET ORAL at 20:10

## 2022-01-18 RX ADMIN — Medication 1 TABLET: at 09:31

## 2022-01-18 RX ADMIN — HYDROCODONE BITARTRATE AND ACETAMINOPHEN 1 TABLET: 5; 325 TABLET ORAL at 15:57

## 2022-01-18 RX ADMIN — NITROGLYCERIN 0.5 INCH: 20 OINTMENT TOPICAL at 18:09

## 2022-01-18 RX ADMIN — HYDROCODONE BITARTRATE AND ACETAMINOPHEN 1 TABLET: 5; 325 TABLET ORAL at 09:40

## 2022-01-18 RX ADMIN — Medication 100 MG: at 09:32

## 2022-01-18 ASSESSMENT — PAIN SCALES - GENERAL
PAINLEVEL_OUTOF10: 10
PAINLEVEL_OUTOF10: 10
PAINLEVEL_OUTOF10: 8
PAINLEVEL_OUTOF10: 7
PAINLEVEL_OUTOF10: 10
PAINLEVEL_OUTOF10: 7
PAINLEVEL_OUTOF10: 10
PAINLEVEL_OUTOF10: 7
PAINLEVEL_OUTOF10: 0
PAINLEVEL_OUTOF10: 10
PAINLEVEL_OUTOF10: 7
PAINLEVEL_OUTOF10: 8

## 2022-01-18 ASSESSMENT — PAIN DESCRIPTION - PAIN TYPE
TYPE: CHRONIC PAIN

## 2022-01-18 ASSESSMENT — PAIN DESCRIPTION - FREQUENCY: FREQUENCY: CONTINUOUS

## 2022-01-18 ASSESSMENT — PAIN DESCRIPTION - LOCATION
LOCATION: BACK

## 2022-01-18 ASSESSMENT — PAIN DESCRIPTION - DESCRIPTORS: DESCRIPTORS: ACHING

## 2022-01-18 ASSESSMENT — PAIN DESCRIPTION - ORIENTATION
ORIENTATION: LOWER
ORIENTATION: LOWER

## 2022-01-18 ASSESSMENT — PAIN - FUNCTIONAL ASSESSMENT: PAIN_FUNCTIONAL_ASSESSMENT: ACTIVITIES ARE NOT PREVENTED

## 2022-01-18 ASSESSMENT — PAIN DESCRIPTION - ONSET: ONSET: ON-GOING

## 2022-01-18 ASSESSMENT — PAIN DESCRIPTION - PROGRESSION
CLINICAL_PROGRESSION: NOT CHANGED
CLINICAL_PROGRESSION: GRADUALLY WORSENING

## 2022-01-18 NOTE — PROGRESS NOTES
HOSPITALIST PROGRESS NOTE    Patient:  Kendra Bañuelos    Unit/Bed:[id number]-27/027-A  YOB: 1960  MRN: [id number]   PCP: No primary care provider on file. Date of Admission: 1/10/2022  Date of Service: 1/18/2022  Chief Complaint:- Foot pain    Assessment and Plan:  1. Bilateral LE Ulcers/Eschar/?Frostbite on RLE: Patient presents with what appears to be frostbite of the right foot as well as a full-thickness ulceration of the left foot. Patient is afebrile however leukocytosis was present on arrival.  All digits of the right foot appear necrotic, foul-smelling and gangrenous which extends to the metatarsal level. The left foot has an open ulcer on the dorsal aspect near the second digit however does not appear to be necrotic appearing as the right foot. CTA of the abdominal aorta with bilateral runoff shows an area of significant narrowing in the distal right superficial femoral artery with what appears to be relatively normal flow in the left LE circulation. Podiatry consulted and performing daily wound care/Betadine debridement. · MRI of the left foot done 1/13/2022 reveals what appears to be osteomyelitis in the first digit and metatarsal. Soft tissue gas was also noted. As per podiatry, plan for eventual right TMA. · S/p RLE arteriogram with angioplasty 1/17/2022 by interventional radiology  · Continue with broad-spectrum antibiotics with Zosyn started 1/10/2022. Wound cultures show growth of MSSA - Vancocin discontinued. 2. 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 Iron studies done suggest inflammatory anemia. Benign reticulocyte count, however patient has been having a drop in his chronic macrocytic hemoglobin compared to his usual baseline.   We will continue to monitor/trend, unclear if patient will follow up with specialist as outpatient but will make suggestion if need be.  3. History of EtOH Abuse: Patient no longer agitated ED showed bilateral lower extremity eschars along with ulcers and discolored toes. Admission labs showed multiple significant abnormalities which included hyponatremia, hypokalemia, hyperbilirubinemia leukocytosis, macrocytic anemia, rhabdomyolysis. Patient admitted for these bilateral ulcers/eschar and multiple lab abnormalities. Please see Assessment and Plan for further details on hospital course. Scheduled Meds:   clopidogrel  75 mg Oral Daily    nitroglycerin  0.5 inch Topical Daily    piperacillin-tazobactam  3,375 mg IntraVENous Q8H    nicotine  1 patch TransDERmal Daily    phosphorus replacement protocol   Other RX Placeholder    calcium replacement protocol   Other RX Placeholder    sodium chloride flush  5-40 mL IntraVENous 2 times per day    enoxaparin  40 mg SubCUTAneous Q24H    thiamine  100 mg Oral Daily    multivitamin  1 tablet Oral Daily     Continuous Infusions:   sodium chloride 75 mL/hr at 01/18/22 0537    sodium chloride         PHYSICAL EXAMINATION:  Blood pressure (!) 154/67, pulse 87, temperature 97.8 °F (36.6 °C), temperature source Oral, resp. rate 16, height 6' (1.829 m), weight 140 lb (63.5 kg), SpO2 96 %. Oxygen Delivery -    General: Acute on chronic ill appearing. Appears disheveled. In no acute distress. HEENT:  normocephalic and atraumatic. No scleral icterus. PERR  Neck: supple. No Thyromegaly. Lungs: clear to auscultation. No retractions  Cardiac: RRR. No JVD. Abdomen: soft. Nontender. Extremities: Open ulceration on the dorsum of the left foot. Right foot from metatarsals to toes are covered in Ace wraps soaked in Betadine. See below for appearance of feet prior to bandaging/wound care. Vasculature: delayed capillary refill. Skin: cool and dry in the LE b/l  Psych:  Alert and oriented x3. Lymph:  No supraclavicular adenopathy. Neurologic:  No focal deficit.  No seizures            Diagnostic Data: (All radiographs, EKGs, PFTs, and imaging are personally viewed and interpreted unless otherwise noted). CBC:   Recent Labs     01/17/22  0455 01/18/22  0630   WBC 3.6* 3.0*   HGB 10.0* 9.4*    161     BMP:    Recent Labs     01/17/22  0455 01/17/22  1944 01/18/22  0630   *  --  131*   K 2.8* 3.5 3.3*     --  100   CO2 20*  --  21*   BUN 6*  --  4*   CREATININE 0.5  --  0.5   GLUCOSE 93  --  107     Ionized Calcium: No results for input(s): IONCA in the last 72 hours. Magnesium:   Recent Labs     01/17/22 0455   MG 1.6     Phosphorus:   No results for input(s): PHOS in the last 72 hours. Hepatic:   No results for input(s): AST, ALT, ALB, BILITOT, ALKPHOS in the last 72 hours. Vitamin B12: No components found for: B12  Folate:   Lab Results   Component Value Date    FOLATE 9.8 01/10/2022     IRON:   Lab Results   Component Value Date    IRON 21 01/17/2022     Iron Saturation: No components found for: PERCENTFE  TIBC:   Lab Results   Component Value Date    TIBC 117 01/17/2022     Ferritin:   Lab Results   Component Value Date    FERRITIN 806 01/17/2022     Troponin:   Lab Results   Component Value Date    TROPONINT < 0.010 07/06/2021    TROPONINT < 0.010 09/01/2014     BNP: No results found for: BNP  Lipids: No results found for: LDL  INR:   Lab Results   Component Value Date    INR 1.56 01/12/2022     ABG: No results found for: PH, PCO2, PO2, HCO3, O2SAT  TSH:   Lab Results   Component Value Date    TSH 1.650 06/27/2017     Sed Rate: No results found for: SEDRATE  CRP:   Lab Results   Component Value Date    CRP 12.22 01/10/2022     UA:   No results for input(s): Croeen Gash, COLORU, CLARITYU, MUCUS, PROTEINU, BLOODU, RBCUA, [address], BACTERIA, NITRU, GLUCOSEU, BILIRUBINUR, UROBILINOGEN, KETUA, LABCAST, LABCASTTY, AMORPHOS in the last 72 hours.     Invalid input(s): CRYSTALS  Micro:   Lab Results   Component Value Date    BC No growth-preliminary No growth  01/10/2022     Imaging Quick Reads from Previous 7 Days:  XR FOOT LEFT (MIN 3 VIEWS)    Result Date: 1/10/2022   1. Stable left foot radiographs, no interval change since previous study dated 30 January 2020. 2. Abnormal density involving the first metatarsophalangeal joint, unchanged. 3. Degenerative change. 4. No definite evidence of fracture, bony destruction or osteomyelitis. . **This report has been created using voice recognition software. It may contain minor errors which are inherent in voice recognition technology. ** Final report electronically signed by DR Yesenia Steele on 1/10/2022 2:00 PM    XR FOOT RIGHT (MIN 3 VIEWS)    Result Date: 1/10/2022   1. Postoperative changes in the distal tibia. 2. Abnormal density involving the neck of the fifth metacarpal. 3. Degenerative changes. 4. Diffuse osteopenia. 5. Soft tissue swelling over the dorsum of foot. . **This report has been created using voice recognition software. It may contain minor errors which are inherent in voice recognition technology. ** Final report electronically signed by DR Yesenia Steele on 1/10/2022 2:03 PM    CTA ABDOMINAL AORTA W BILAT RUNOFF W WO CONTRAST    Result Date: 1/10/2022  1. Limited evaluation of the right leg secondary to patient motion artifact. 2. Relatively normal flow in the abdominal aorta, visceral arteries, right and left common, internal and external iliac arteries common femoral arteries. 3. There is an area of significant narrowing in the distal right superficial femoral artery. 4. There is relatively normal flow in the left common and superficial femoral, popliteal, anterior and posterior tibial arteries. 5. Intramedullary jaleesa in the right tibia-fibula. 6. Mild diffuse fatty replacement in the liver. 7. Punctate calcifications in spleen. 8. Small bilateral adrenal nodules. 9. Atrophic pancreas. Dilated pancreatic duct. 10. 2.2 cm fluid collection in the region of the splenic hilum. 11. Enlarged prostate gland. **This report has been created using voice recognition software.  It may contain minor errors which are inherent in voice recognition technology. ** Final report electronically signed by DR Keiko Le on 1/10/2022 9:10 PM    Electronically signed by Tona Torres.  Trung Donaldson M.D. on 1/18/2022  PGY-2 Internal Medicine Resident

## 2022-01-18 NOTE — PROGRESS NOTES
Podiatric Progress Note  Atiya Ramos  Subjective :   1/19/2022  Patient seen at bedside today on behalf of Dr. Hector Breen. Patient is awake, and is alert and oriented to person, place, and time. Patient continues to endorse 10/10 pain to his legs; he also endorses 10/10 pain from his lower back. Otherwise, patient denies any N/V/F/C/SOB/CP or bilateral calf tenderness. Patient is curious to know what the timeline is for his eventual amputation surgery. He has no other pedal complaints at this time. 1/17/2022  Patient seen at bedside this morning on behalf of Dr. Hector Breen. Patient is alert and oriented to person, place, and time. He is not in acute distress. Patient states that overall, he is feeling good. He endorses pain to his bilateral lower extremities, and rates the pain as 10/10. Prior to today, patient denied any pain in bilateral lower extremities and stated that he could not feel anything that is bilateral lower extremities were constantly numb. Patient asked if he could be allowed outside to smoke, as he states that the nicotine replacement therapy is not working well for him. Patient would like to know when his right foot will be amputated. Otherwise, he denies any N/V/F/C/SOB/CP or bilateral calf tenderness. He has no other pedal complaints at this time. 1/16/2022  Patient seen at bedside today on behalf of Dr. Hector Breen. Patient is alert and oriented to person, place, and time. Patient is extremely displeased that he is still in the hospital. He would like to be able to go smoke. He wants his right foot amputated, and is unsure why the amputation is being delayed. He denies any nausea, vomiting, fever, chills, chest pain, shortness of breath. No other pedal complaints. 1/15/2022  Patient seen at bedside today on behalf of Dr. Hector Breen. Patient alert and oriented. Patient continues to deny any pain to bilateral feet. He also denies any N/V/F/C/SOB/CP.   That he is still at high risk for losing the digits of the right foot. Otherwise no new complaints. 1/14/2022  Patient seen at bedside today on behalf of Dr. Jarrett Flaherty. Patient alert and oriented. Patient continues to deny any pain to bilateral feet. He also denies any N/V/F/C/SOB/CP or bilateral calf tenderness. Patient inquired as to which of his toes he may be a losing; reiterated to patient that he has frostbite involving all 5 digits on the right foot, and that he is going to lose all 5 digits of the right foot. Otherwise, patient has no other pedal complaints at this time. 1/13/2022  Patient seen at bedside today on behalf of Dr. Jarrett Flaherty. Patient was somnolent during encounter; Per sitter, patient has been sleepy all day. Patient denies any pain to bilateral feet. He currently denies any N/V/F/C/SOB/CP or bilateral calf tenderness. He has no new pedal complaints at this time. 1/12/2022  Patient seen at bedside today alongside Dr. Jarrett Flaherty. Patient appeared pleasant, was oriented to person, place and time and in no acute distress. Patient denies any pain to either of his feet. Patient also denies any N/V/F/C/SOB or CP. Patient has no further pedal concerns at this point in time. HPI:  The patient is a homeless 63 y.o. male with significant past medical history of substance abuse (cocaine use, alcohol) tobacco use, and incarcerated left inguinal hernia who being seen at bedside today on behalf of Dr. Jarrett Flaherty. Patient was admitted yesterday via [place] [address] ED for frostbite, foot wound, and lab abnormalities. Patient is pleasant, but very poor historian. Patient does not know how long he has had frostbite changes to his right foot nor the wound on his left foot. He thinks they must have been there for several weeks.   He relates that his feet have been hurting for several weeks; however when asked by RN prior to getting pain medication, patient stated he cannot feel any pain in his lower legs as they are numb all the time and all of his pain is from his lower back. Patient admits drinking approximately 6 drinks per day daily for the last several years. He currently denies any N/V/F/C/SOB/CP or bilateral calf tenderness. He has no other pedal complaints at this time.     Current Medications:    Current Facility-Administered Medications: ketorolac (TORADOL) injection 15 mg, 15 mg, IntraVENous, Q6H PRN  metoprolol (LOPRESSOR) injection 5 mg, 5 mg, IntraVENous, Q6H PRN  clopidogrel (PLAVIX) tablet 75 mg, 75 mg, Oral, Daily  nitroglycerin (NITRO-BID) 2 % ointment 0.5 inch, 0.5 inch, Topical, Daily  melatonin tablet 6 mg, 6 mg, Oral, Nightly PRN  hydrOXYzine (ATARAX) tablet 25 mg, 25 mg, Oral, Nightly PRN  piperacillin-tazobactam (ZOSYN) 3,375 mg in dextrose 5 % 50 mL IVPB extended infusion (mini-bag), 3,375 mg, IntraVENous, Q8H  nicotine (NICODERM CQ) 14 MG/24HR 1 patch, 1 patch, TransDERmal, Daily  haloperidol lactate (HALDOL) injection 1 mg, 1 mg, IntraMUSCular, Q6H PRN  0.9 % sodium chloride infusion, , IntraVENous, Continuous  HYDROcodone-acetaminophen (NORCO) 5-325 MG per tablet 1 tablet, 1 tablet, Oral, Q6H PRN  phosphorus replacement protocol, , Other, RX Placeholder  calcium replacement protocol, , Other, RX Placeholder  povidone-iodine (BETADINE) 10 % external solution, , Topical, PRN  sodium chloride flush 0.9 % injection 5-40 mL, 5-40 mL, IntraVENous, 2 times per day  sodium chloride flush 0.9 % injection 5-40 mL, 5-40 mL, IntraVENous, PRN  0.9 % sodium chloride infusion, 25 mL, IntraVENous, PRN  enoxaparin (LOVENOX) injection 40 mg, 40 mg, SubCUTAneous, Q24H  ondansetron (ZOFRAN-ODT) disintegrating tablet 4 mg, 4 mg, Oral, Q8H PRN **OR** ondansetron (ZOFRAN) injection 4 mg, 4 mg, IntraVENous, Q6H PRN  polyethylene glycol (GLYCOLAX) packet 17 g, 17 g, Oral, Daily PRN  acetaminophen (TYLENOL) tablet 650 mg, 650 mg, Oral, Q6H PRN **OR** acetaminophen (TYLENOL) suppository 650 mg, 650 mg, Rectal, Q6H PRN  thiamine tablet 100 mg, 100 mg, Oral, Daily  multivitamin 1 tablet, 1 tablet, Oral, Daily  potassium chloride (KLOR-CON M) extended release tablet 40 mEq, 40 mEq, Oral, PRN **OR** potassium bicarb-citric acid (EFFER-K) effervescent tablet 40 mEq, 40 mEq, Oral, PRN **OR** potassium chloride 10 mEq/100 mL IVPB (Peripheral Line), 10 mEq, IntraVENous, PRN  magnesium sulfate 2000 mg in 50 mL IVPB premix, 2,000 mg, IntraVENous, PRN  LORazepam (ATIVAN) tablet 1 mg, 1 mg, Oral, Q1H PRN **OR** LORazepam (ATIVAN) injection 1 mg, 1 mg, IntraVENous, Q1H PRN **OR** LORazepam (ATIVAN) tablet 2 mg, 2 mg, Oral, Q1H PRN **OR** LORazepam (ATIVAN) injection 2 mg, 2 mg, IntraVENous, Q1H PRN **OR** LORazepam (ATIVAN) tablet 3 mg, 3 mg, Oral, Q1H PRN **OR** LORazepam (ATIVAN) injection 3 mg, 3 mg, IntraVENous, Q1H PRN **OR** LORazepam (ATIVAN) tablet 4 mg, 4 mg, Oral, Q1H PRN **OR** LORazepam (ATIVAN) injection 4 mg, 4 mg, IntraVENous, Q1H PRN    Objective     /65   Pulse 99   Temp 97.5 °F (36.4 °C) (Oral)   Resp 16   Ht 6' (1.829 m)   Wt 140 lb (63.5 kg)   SpO2 96%   BMI 18.99 kg/m²      I/O:    Intake/Output Summary (Last 24 hours) at 1/18/2022 1840  Last data filed at 1/18/2022 1801  Gross per 24 hour   Intake 4225.14 ml   Output 1450 ml   Net 2775.14 ml              Wt Readings from Last 3 Encounters:   01/10/22 140 lb (63.5 kg)   07/06/21 150 lb (68 kg)   05/27/21 160 lb (72.6 kg)       LABS:    Recent Labs     01/17/22  0455 01/18/22  0630   WBC 3.6* 3.0*   HGB 10.0* 9.4*   HCT 29.6* 27.8*    161        Recent Labs     01/18/22  0630   *   K 3.3*      CO2 21*   BUN 4*   CREATININE 0.5        No results for input(s): PROT, INR, APTT in the last 72 hours.      Recent Labs     01/18/22  0630   CKTOTAL 75       Focused Lower Extremity Examination:    Vitals:    /65   Pulse 99   Temp 97.5 °F (36.4 °C) (Oral)   Resp 16   Ht 6' (1.829 m)   Wt 140 lb (63.5 kg)   SpO2 96%   BMI 18.99 kg/m²      Dermatologic: Dressings to BLE intact. There is a full-thickness ulceration noted to the dorsal aspect of left foot, between metatarsal necks 1 and 2. Wound bed with fibro-granular tissue with small areas of infarcted tissue. There is very scant serous drainage noted. No purulence or malodor. Wound appears stable at this time. Superficial thickness ulceration noted to distal aspect of left second digit; left second digit is mildly erythematous and edematous; erythema and edema consistent with previous. No drainage or malodor noted to superficial ulcer on the distal left second digit. On the right, there are extensive ischemic and necrotic changes to all 5 digits extending proximally to the distal metatarsal level. There is a deroofed blister noted to the dorsum of the right distal forefoot. There are superficial thickness ulcerations on the dorsal medial aspect of the right hallux and the distal tip of the left fifth digit. Digits 1 through 5 of the distal right forefoot are beginning to harden and turn very dark in coloration. There is increasing duskiness to the rest of the digits extending toward the metatarsophalangeal joints on the right foot. Some bogginess to distal-lateral aspect of patient's right foot in area of de-roofed blister. There is slight malodor noted to the right forefoot. Erythema present on the proximal aspect of frostbitten tissue of right foot.     Vascular: Dorsalis pedis and posterior tibial pulses are palpable bilaterally; DP and PT pulses are diminished to the RLE. Skin temperature is warm to warm from proximal tibial tuberosity to distal digits of left foot; skin temperature is warm to  cool from proximal tibial tuberosity to distal forefoot and digits of right foot. Right foot is appreciably warmer after revascularization procedure. CFT within normal limits to all 5 digits of left foot; CFT absent to distal tips of all 5 digits of right foot. Edema is present to RLE.  Pedal hair is absent bilaterally.     Neurovascular: Light touch sensation grossly diminished to the midfoot bilaterally.      Musculoskeletal: Muscle strength 4/5 and symmetric for all muscle groups crossing the ankle joint bilaterally. Decreased ROM noted at the ankle and ST joints bilaterally. No pain with palpation of any foot or ankle structures. Foot and ankle grossly rectus alignment bilaterally. STUDIES:  XR, right foot  Impression       1. Postoperative changes in the distal tibia. 2. Abnormal density involving the neck of the fifth metacarpal.   3. Degenerative changes. 4. Diffuse osteopenia. 5. Soft tissue swelling over the dorsum of foot. .                   **This report has been created using voice recognition software. It may contain minor errors which are inherent in voice recognition technology. **       Final report electronically signed by DR Keiko Le on 1/10/2022 2:03 PM      XR, left foot  Impression       1. Stable left foot radiographs, no interval change since previous study dated 30 January 2020.   2. Abnormal density involving the first metatarsophalangeal joint, unchanged. 3. Degenerative change. 4. No definite evidence of fracture, bony destruction or osteomyelitis. .                   **This report has been created using voice recognition software. It may contain minor errors which are inherent in voice recognition technology. **       Final report electronically signed by DR Keiko Le on 1/10/2022 2:00 PM      CTA, abdominal aorta with bilateral runoff  Impression       1. Limited evaluation of the right leg secondary to patient motion artifact. 2. Relatively normal flow in the abdominal aorta, visceral arteries, right and left common, internal and external iliac arteries common femoral arteries. 3. There is an area of significant narrowing in the distal right superficial femoral artery.    4. There is relatively normal flow in the left common and superficial femoral, popliteal, anterior and posterior tibial arteries. 5. Intramedullary jaleesa in the right tibia-fibula. 6. Mild diffuse fatty replacement in the liver. 7. Punctate calcifications in spleen. 8. Small bilateral adrenal nodules. 9. Atrophic pancreas. Dilated pancreatic duct. 10. 2.2 cm fluid collection in the region of the splenic hilum. 11. Enlarged prostate gland.           **This report has been created using voice recognition software. It may contain minor errors which are inherent in voice recognition technology. **       Final report electronically signed by DR Amber Pritchett on 1/10/2022 9:10 PM     MRI, left foot  Impression       1. The soft tissue ulcer appears to approximate the proximal phalanx of the first digit. Soft tissue gas is noted. Osteomyelitis cannot be excluded.       2. Increased bone marrow signal on STIR without corresponding T1 hypointensity in the first metatarsal is a nonspecific findings. Likely relates to reactive edema at this time. The finding is equivocal for osteomyelitis.       3. Linear hyperintensity and STIR in the base of the proximal phalanx of the fifth digit without corresponding T1 hypointensity likely relates to reactive bone marrow edema versus a stress reaction. Clinical correlation is recommended.       4. Marked degenerative changes of the first metatarsophalangeal joint. Hallux valgus deformity.               **This report has been created using voice recognition software.  It may contain minor errors which are inherent in voice recognition technology. **       Final report electronically signed by Dr Asa Jiang on 1/13/2022 11:52 AM     Lower extremity arterial Doppler, bilateral  Impression   1. Normal findings in the left leg. 2. Abnormal findings in the right leg,. Findings suggestive of mild stenosis at the origin of the profunda and possibly at the origin right SFA. Total occlusion mid right SFA with distal collateral reconstitution.  Also evidence for trifurcation disease    right side. 3. Aortofemoral angiography is recommended for further evaluation with possible intervention. If desired, this can be arranged through the interventional scheduling desk of [facility] department ([phone]).             **This report has been created using voice recognition software.  It may contain minor errors which are inherent in voice recognition technology. **       Final report electronically signed by Dr. Maren Green on 1/14/2022 11:14 AM       ASSESSMENT: Pt. is a 63 y.o. male with:  Principle  Frostbite, right foot  Full-thickness ulceration, left foot    Chronic  Patient Active Problem List   Diagnosis    Incarcerated left inguinal hernia    Tobacco dependence    Substance induced mood disorder ([facility] [address]    Foot pain, bilateral    Wound infection       PLAN:   Frostbite, right foot  Full-thickness ulceration, left foot  -Patient examined and evaluated. -WBC 3.0; patient afebrile  -Blood cultures 1 and 2, preliminary results: No growth  -Swab cultures results: Staphylococcus aureus and Pseudomonas aeruginosa  -Continue IV zosyn per primary  -Reviewed MRI; impression above. Results equivocal for osteomyelitis. No definite changes consistent with osteomyelitis were noted.   -Reviewed lower extremity arterial Doppler; impression above  -Patient is 1 day s/p angiogram with intervention tomorrow, he  -Applied nitroglycerin paste to right forefoot, Betadine wet-to-dry to distal right forefoot; wrapped loosely with Kerlix, betadine to wounds of left foot with loosely applied kerlix.  -Patient okay to weight-bear as tolerated while wearing the postop shoe; encouraged patient to minimize weightbearing  -Placed order for daily dressing changes with nitroglycerin paste to the dorsal aspect of the foot bilaterally, Betadine wet-to-dry to distal right forefoot and wound to dorsum of left forefoot wrapped with Kerlix rolls.  -Discussed with patient that it is dangerous to perform amputation before demarcation of skin. There is significant risk that amputation margins will become necrotic and that he will require more proximal amputation  -Surgical planning will be pended until intervention is performed and response to intervention is visualized. -Patient verbalized understanding and agreement with the treatment plan as stated. All of his questions were answered to satisfaction. DISPO: Pending continuing demarcation of ischemic changes on the right foot; eventual need for amputation.     Vicki Mckee DPM, PGY-2  Podiatric Surgical Resident  1/18/2022   6:40 PM

## 2022-01-19 PROBLEM — E44.0 MODERATE MALNUTRITION (HCC): Status: ACTIVE | Noted: 2022-01-19

## 2022-01-19 LAB
ANION GAP SERPL CALCULATED.3IONS-SCNC: 7 MEQ/L (ref 8–16)
BASOPHILS # BLD: 1.7 %
BASOPHILS ABSOLUTE: 0 THOU/MM3 (ref 0–0.1)
BUN BLDV-MCNC: 4 MG/DL (ref 7–22)
CALCIUM SERPL-MCNC: 7.8 MG/DL (ref 8.5–10.5)
CHLORIDE BLD-SCNC: 103 MEQ/L (ref 98–111)
CO2: 21 MEQ/L (ref 23–33)
CREAT SERPL-MCNC: 0.6 MG/DL (ref 0.4–1.2)
EOSINOPHIL # BLD: 1.7 %
EOSINOPHILS ABSOLUTE: 0 THOU/MM3 (ref 0–0.4)
ERYTHROCYTE [DISTWIDTH] IN BLOOD BY AUTOMATED COUNT: 14.4 % (ref 11.5–14.5)
ERYTHROCYTE [DISTWIDTH] IN BLOOD BY AUTOMATED COUNT: 52.6 FL (ref 35–45)
GFR SERPL CREATININE-BSD FRML MDRD: > 90 ML/MIN/1.73M2
GLUCOSE BLD-MCNC: 101 MG/DL (ref 70–108)
HCT VFR BLD CALC: 26.1 % (ref 42–52)
HEMOGLOBIN: 9 GM/DL (ref 14–18)
IMMATURE GRANS (ABS): 0.03 THOU/MM3 (ref 0–0.07)
IMMATURE GRANULOCYTES: 1.3 %
LYMPHOCYTES # BLD: 27.9 %
LYMPHOCYTES ABSOLUTE: 0.6 THOU/MM3 (ref 1–4.8)
MCH RBC QN AUTO: 34.4 PG (ref 26–33)
MCHC RBC AUTO-ENTMCNC: 34.5 GM/DL (ref 32.2–35.5)
MCV RBC AUTO: 99.6 FL (ref 80–94)
MONOCYTES # BLD: 9 %
MONOCYTES ABSOLUTE: 0.2 THOU/MM3 (ref 0.4–1.3)
NUCLEATED RED BLOOD CELLS: 0 /100 WBC
PLATELET # BLD: 151 THOU/MM3 (ref 130–400)
PLATELET ESTIMATE: ADEQUATE
PMV BLD AUTO: 10.8 FL (ref 9.4–12.4)
POTASSIUM SERPL-SCNC: 3.1 MEQ/L (ref 3.5–5.2)
RBC # BLD: 2.62 MILL/MM3 (ref 4.7–6.1)
ROULEAUX: SLIGHT
SCAN OF BLOOD SMEAR: NORMAL
SEG NEUTROPHILS: 58.4 %
SEGMENTED NEUTROPHILS ABSOLUTE COUNT: 1.3 THOU/MM3 (ref 1.8–7.7)
SODIUM BLD-SCNC: 131 MEQ/L (ref 135–145)
WBC # BLD: 2.3 THOU/MM3 (ref 4.8–10.8)

## 2022-01-19 PROCEDURE — 2580000003 HC RX 258: Performed by: HOSPITALIST

## 2022-01-19 PROCEDURE — 2580000003 HC RX 258: Performed by: INTERNAL MEDICINE

## 2022-01-19 PROCEDURE — 6360000002 HC RX W HCPCS: Performed by: INTERNAL MEDICINE

## 2022-01-19 PROCEDURE — 99233 SBSQ HOSP IP/OBS HIGH 50: CPT | Performed by: HOSPITALIST

## 2022-01-19 PROCEDURE — 51798 US URINE CAPACITY MEASURE: CPT

## 2022-01-19 PROCEDURE — 85025 COMPLETE CBC W/AUTO DIFF WBC: CPT

## 2022-01-19 PROCEDURE — 6370000000 HC RX 637 (ALT 250 FOR IP): Performed by: INTERNAL MEDICINE

## 2022-01-19 PROCEDURE — 6370000000 HC RX 637 (ALT 250 FOR IP): Performed by: RADIOLOGY

## 2022-01-19 PROCEDURE — 6360000002 HC RX W HCPCS: Performed by: STUDENT IN AN ORGANIZED HEALTH CARE EDUCATION/TRAINING PROGRAM

## 2022-01-19 PROCEDURE — 80048 BASIC METABOLIC PNL TOTAL CA: CPT

## 2022-01-19 PROCEDURE — 1200000000 HC SEMI PRIVATE

## 2022-01-19 PROCEDURE — 6370000000 HC RX 637 (ALT 250 FOR IP): Performed by: PHYSICIAN ASSISTANT

## 2022-01-19 PROCEDURE — 6370000000 HC RX 637 (ALT 250 FOR IP): Performed by: STUDENT IN AN ORGANIZED HEALTH CARE EDUCATION/TRAINING PROGRAM

## 2022-01-19 PROCEDURE — 2580000003 HC RX 258: Performed by: STUDENT IN AN ORGANIZED HEALTH CARE EDUCATION/TRAINING PROGRAM

## 2022-01-19 PROCEDURE — 36415 COLL VENOUS BLD VENIPUNCTURE: CPT

## 2022-01-19 RX ORDER — GUAIFENESIN/DEXTROMETHORPHAN 100-10MG/5
10 SYRUP ORAL EVERY 4 HOURS PRN
Status: DISCONTINUED | OUTPATIENT
Start: 2022-01-19 | End: 2022-01-24 | Stop reason: HOSPADM

## 2022-01-19 RX ORDER — AMOXICILLIN AND CLAVULANATE POTASSIUM 875; 125 MG/1; MG/1
1 TABLET, FILM COATED ORAL EVERY 12 HOURS SCHEDULED
Status: DISCONTINUED | OUTPATIENT
Start: 2022-01-19 | End: 2022-01-24 | Stop reason: HOSPADM

## 2022-01-19 RX ADMIN — CLOPIDOGREL BISULFATE 75 MG: 75 TABLET, FILM COATED ORAL at 10:30

## 2022-01-19 RX ADMIN — PIPERACILLIN AND TAZOBACTAM 3375 MG: 3; .375 INJECTION, POWDER, LYOPHILIZED, FOR SOLUTION INTRAVENOUS at 00:46

## 2022-01-19 RX ADMIN — LORAZEPAM 1 MG: 1 TABLET ORAL at 23:37

## 2022-01-19 RX ADMIN — AMOXICILLIN AND CLAVULANATE POTASSIUM 1 TABLET: 875; 125 TABLET, FILM COATED ORAL at 20:41

## 2022-01-19 RX ADMIN — POTASSIUM CHLORIDE 40 MEQ: 1500 TABLET, EXTENDED RELEASE ORAL at 12:30

## 2022-01-19 RX ADMIN — GUAIFENESIN AND DEXTROMETHORPHAN 10 ML: 100; 10 SYRUP ORAL at 23:43

## 2022-01-19 RX ADMIN — SODIUM CHLORIDE: 9 INJECTION, SOLUTION INTRAVENOUS at 05:09

## 2022-01-19 RX ADMIN — Medication 6 MG: at 20:41

## 2022-01-19 RX ADMIN — PIPERACILLIN AND TAZOBACTAM 3375 MG: 3; .375 INJECTION, POWDER, LYOPHILIZED, FOR SOLUTION INTRAVENOUS at 15:53

## 2022-01-19 RX ADMIN — HYDROCODONE BITARTRATE AND ACETAMINOPHEN 1 TABLET: 5; 325 TABLET ORAL at 15:57

## 2022-01-19 RX ADMIN — HYDROCODONE BITARTRATE AND ACETAMINOPHEN 1 TABLET: 5; 325 TABLET ORAL at 05:07

## 2022-01-19 RX ADMIN — Medication 1 TABLET: at 11:11

## 2022-01-19 RX ADMIN — PIPERACILLIN AND TAZOBACTAM 3375 MG: 3; .375 INJECTION, POWDER, LYOPHILIZED, FOR SOLUTION INTRAVENOUS at 08:52

## 2022-01-19 RX ADMIN — ENOXAPARIN SODIUM 40 MG: 100 INJECTION SUBCUTANEOUS at 20:41

## 2022-01-19 RX ADMIN — SODIUM CHLORIDE, PRESERVATIVE FREE 10 ML: 5 INJECTION INTRAVENOUS at 20:41

## 2022-01-19 RX ADMIN — SODIUM CHLORIDE: 9 INJECTION, SOLUTION INTRAVENOUS at 18:23

## 2022-01-19 RX ADMIN — NITROGLYCERIN 0.5 INCH: 20 OINTMENT TOPICAL at 13:45

## 2022-01-19 RX ADMIN — GUAIFENESIN AND DEXTROMETHORPHAN 10 ML: 100; 10 SYRUP ORAL at 13:52

## 2022-01-19 RX ADMIN — HYDROXYZINE HYDROCHLORIDE 25 MG: 25 TABLET ORAL at 20:41

## 2022-01-19 RX ADMIN — KETOROLAC TROMETHAMINE 15 MG: 30 INJECTION, SOLUTION INTRAMUSCULAR; INTRAVENOUS at 20:41

## 2022-01-19 RX ADMIN — Medication 100 MG: at 11:11

## 2022-01-19 RX ADMIN — GUAIFENESIN AND DEXTROMETHORPHAN 10 ML: 100; 10 SYRUP ORAL at 18:27

## 2022-01-19 ASSESSMENT — PAIN SCALES - GENERAL
PAINLEVEL_OUTOF10: 7
PAINLEVEL_OUTOF10: 5
PAINLEVEL_OUTOF10: 10
PAINLEVEL_OUTOF10: 10
PAINLEVEL_OUTOF10: 0

## 2022-01-19 NOTE — PROGRESS NOTES
HOSPITALIST PROGRESS NOTE    Patient:  Nikolas Worrell    Unit/Bed:7K-27/027-A  YOB: 1960  MRN: [id number]   PCP: No primary care provider on file. Date of Admission: 1/10/2022  Date of Service: 1/19/2022  Chief Complaint:- Foot pain    Assessment and Plan:  1. Bilateral LE Ulcers/Eschar/?Frostbite on RLE: Patient presents with what appears to be frostbite of the right foot as well as a full-thickness ulceration of the left foot. Patient is afebrile however leukocytosis was present on arrival.  All digits of the right foot appear necrotic, foul-smelling and gangrenous which extends to the metatarsal level. The left foot has an open ulcer on the dorsal aspect near the second digit however does not appear to be necrotic appearing as the right foot. CTA of the abdominal aorta with bilateral runoff shows an area of significant narrowing in the distal right superficial femoral artery with what appears to be relatively normal flow in the left LE circulation. Podiatry consulted and performing daily wound care/Betadine debridement. MRI 1/13/2022 reveals what appears to be osteomyelitis in the first digit and metatarsal and soft tissue gas as noted by radiologist.  Patient underwent RLE arteriogram with angioplasty on 1/17/2022 by IR. · As per podiatry, plan for eventual TMA as outpatient. No indication for immediate amputation at this time. · De-escalate antibiotics to Augmentin. No further indication for pseudomonal coverage given length of therapy with Zosyn. Augmentin will continue for another 7 days for full course of treatment for deep skin infections. 2. 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 Iron studies done suggest inflammatory anemia. Benign reticulocyte count, however patient has been having a drop in his chronic macrocytic hemoglobin compared to his usual baseline.   We will continue to monitor/trend, unclear if patient will follow up with specialist as outpatient but will make suggestion if need be.  3. History of EtOH Abuse: Patient no longer agitated as seen in alcohol abuse. Sitter at bedside. 4. Diffuse Alcoholic Fatty Liver Disease without Cirrhosis: Evidence for acute EtOH toxic insult due to mild transaminitis with AST > ALT. Both now downtrending, indicating recovery from EtOH toxic insult. INR 1.56. Platelet count greater than 150,000. Ultrasound of the liver shows fatty infiltration with concurrent hypoechogenicity, normal color flow via Doppler studies. No CBD dilatation. Patient at risk for developing fibrotic changes with continued drinking and eventual cirrhosis. 5. Severe Hypoalbuminemia: Due to severe malnutrition and chronic EtOH use. · IV albumin x3 doses given on 1/12-1/13/2022   · Dietitian following  6. Moderate Acute on Chronic Asymptomatic Hypovolemic Hyponatremia: Plateaued now, stable nearing baseline. Current still Na 131. Urine Na > 20, Urine osm > 100. · Continue with IV NS.  7. Mild Rhabdomyolysis: Resolved. 8. Disposition: Patient ok for discharge per podiatry. However patient requested assisted living, which won't be approved due to insurance, and wishes to now go to a nursing home. SW currently investigating options for ECF. Subjective (Past 24 hour events):  No acute events overnight. Sitter at bedside. Patient quiet as usual, no new complaints or changes in ROS. HPI:  70-year-old homeless male with PMH of EtOH and cocaine abuse who came to the ED on 1/10/2022 due to bilateral lower extremity pain and numbness. On arrival patient was noted to be an extremely poor historian. Patient did state that he has had lower extremity numbness and pain for about 2 weeks. Not sure how long he has had ulcers in the foot. Admits to drinking every day, about 6 drinks per day for several years. Denied any chronic medical problems.   Physical exam in the ED showed bilateral lower extremity eschars along with ulcers and discolored toes. Admission labs showed multiple significant abnormalities which included hyponatremia, hypokalemia, hyperbilirubinemia leukocytosis, macrocytic anemia, rhabdomyolysis. Patient admitted for these bilateral ulcers/eschar and multiple lab abnormalities. Please see Assessment and Plan for further details on hospital course. Scheduled Meds:   clopidogrel  75 mg Oral Daily    nitroglycerin  0.5 inch Topical Daily    piperacillin-tazobactam  3,375 mg IntraVENous Q8H    nicotine  1 patch TransDERmal Daily    phosphorus replacement protocol   Other RX Placeholder    calcium replacement protocol   Other RX Placeholder    sodium chloride flush  5-40 mL IntraVENous 2 times per day    enoxaparin  40 mg SubCUTAneous Q24H    thiamine  100 mg Oral Daily    multivitamin  1 tablet Oral Daily     Continuous Infusions:   sodium chloride 75 mL/hr at 01/19/22 0707    sodium chloride         PHYSICAL EXAMINATION:  Blood pressure 132/88, pulse 101, temperature 98.6 °F (37 °C), temperature source Oral, resp. rate 18, height 6' (1.829 m), weight 140 lb (63.5 kg), SpO2 95 %. Oxygen Delivery -    General: Acute on chronic ill appearing. Appears disheveled. In no acute distress. HEENT:  normocephalic and atraumatic. No scleral icterus. PERR  Neck: supple. No Thyromegaly. Lungs: clear to auscultation. No retractions  Cardiac: RRR. No JVD. Abdomen: soft. Nontender. Extremities: Open ulceration on the dorsum of the left foot. Right foot from metatarsals to toes are covered in Ace wraps soaked in Betadine. See below for appearance of feet prior to bandaging/wound care. Vasculature: delayed capillary refill. Skin: cool and dry in the LE b/l  Psych:  Alert and oriented x3. Lymph:  No supraclavicular adenopathy. Neurologic:  No focal deficit.  No seizures            Diagnostic Data: (All radiographs, EKGs, PFTs, and imaging are personally viewed and interpreted unless otherwise noted). CBC:   Recent Labs     01/17/22  0455 01/18/22  0630   WBC 3.6* 3.0*   HGB 10.0* 9.4*    161     BMP:    Recent Labs     01/17/22  0455 01/17/22  0455 01/17/22  1944 01/18/22  0630 01/19/22  0730   *  --   --  131* 131*   K 2.8*   < > 3.5 3.3* 3.1*     --   --  100 103   CO2 20*  --   --  21* 21*   BUN 6*  --   --  4* 4*   CREATININE 0.5  --   --  0.5 0.6   GLUCOSE 93  --   --  107 101    < > = values in this interval not displayed. Ionized Calcium: No results for input(s): IONCA in the last 72 hours. Magnesium:   Recent Labs     01/17/22  0455   MG 1.6     Phosphorus:   No results for input(s): PHOS in the last 72 hours. Hepatic:   No results for input(s): AST, ALT, ALB, BILITOT, ALKPHOS in the last 72 hours. Vitamin B12: No components found for: B12  Folate:   Lab Results   Component Value Date    FOLATE 9.8 01/10/2022     IRON:   Lab Results   Component Value Date    IRON 21 01/17/2022     Iron Saturation: No components found for: PERCENTFE  TIBC:   Lab Results   Component Value Date    TIBC 117 01/17/2022     Ferritin:   Lab Results   Component Value Date    FERRITIN 806 01/17/2022     Troponin:   Lab Results   Component Value Date    TROPONINT < 0.010 07/06/2021    TROPONINT < 0.010 09/01/2014     BNP: No results found for: BNP  Lipids: No results found for: LDL  INR:   Lab Results   Component Value Date    INR 1.56 01/12/2022     ABG: No results found for: PH, PCO2, PO2, HCO3, O2SAT  TSH:   Lab Results   Component Value Date    TSH 1.650 06/27/2017     Sed Rate: No results found for: SEDRATE  CRP:   Lab Results   Component Value Date    CRP 12.22 01/10/2022     UA:   No results for input(s): Diane Grumbles, COLORU, CLARITYU, MUCUS, PROTEINU, BLOODU, RBCUA, [address], BACTERIA, NITRU, GLUCOSEU, BILIRUBINUR, UROBILINOGEN, KETUA, LABCAST, LABCASTTY, AMORPHOS in the last 72 hours.     Invalid input(s): CRYSTALS  Micro:   Lab Results   Component Value Date BC No growth-preliminary No growth  01/10/2022     Imaging Quick Reads from Previous 7 Days:  XR FOOT LEFT (MIN 3 VIEWS)    Result Date: 1/10/2022   1. Stable left foot radiographs, no interval change since previous study dated 30 January 2020. 2. Abnormal density involving the first metatarsophalangeal joint, unchanged. 3. Degenerative change. 4. No definite evidence of fracture, bony destruction or osteomyelitis. . **This report has been created using voice recognition software. It may contain minor errors which are inherent in voice recognition technology. ** Final report electronically signed by DR Manuel Fritz on 1/10/2022 2:00 PM    XR FOOT RIGHT (MIN 3 VIEWS)    Result Date: 1/10/2022   1. Postoperative changes in the distal tibia. 2. Abnormal density involving the neck of the fifth metacarpal. 3. Degenerative changes. 4. Diffuse osteopenia. 5. Soft tissue swelling over the dorsum of foot. . **This report has been created using voice recognition software. It may contain minor errors which are inherent in voice recognition technology. ** Final report electronically signed by DR Manuel Fritz on 1/10/2022 2:03 PM    CTA ABDOMINAL AORTA W BILAT RUNOFF W WO CONTRAST    Result Date: 1/10/2022  1. Limited evaluation of the right leg secondary to patient motion artifact. 2. Relatively normal flow in the abdominal aorta, visceral arteries, right and left common, internal and external iliac arteries common femoral arteries. 3. There is an area of significant narrowing in the distal right superficial femoral artery. 4. There is relatively normal flow in the left common and superficial femoral, popliteal, anterior and posterior tibial arteries. 5. Intramedullary jaleesa in the right tibia-fibula. 6. Mild diffuse fatty replacement in the liver. 7. Punctate calcifications in spleen. 8. Small bilateral adrenal nodules. 9. Atrophic pancreas. Dilated pancreatic duct. 10. 2.2 cm fluid collection in the region of the splenic hilum.

## 2022-01-19 NOTE — PROGRESS NOTES
Comprehensive Nutrition Assessment    Type and Reason for Visit:  Reassess    Nutrition Recommendations/Plan:   Consider Culturelle  ONS modified to Magic Cup TID, continue to offer Percy BID  Recommend continue current diet and vitamin supplementation    Nutrition Assessment:    Pt. moderately malnourished AEB criteria as listed below. At risk for further nutrition compromise r/t increased nutrient needs to aid in wound healing, poor intake since admission and underlying medical condition (hx alcohol and cocaine abuse). Malnutrition Assessment:  Malnutrition Status: Moderate malnutrition    Context:  Chronic Illness     Findings of the 6 clinical characteristics of malnutrition:  Energy Intake:  7 - 75% or less estimated energy requirements for 1 month or longer  Weight Loss:   (12.5% weight loss per patient report, unable to identify time frame)     Body Fat Loss:   (moderate) Orbital   Muscle Mass Loss:   (moderate) Temples (temporalis)  Fluid Accumulation:  Unable to assess     Strength:  Not Performed    Estimated Daily Nutrient Needs:  Energy (kcal):  3406-8912 kcals (30-35); Weight Used for Energy Requirements:   (63.5kgm on 1/10)     Protein (g):   grams (1.5-2);  Weight Used for Protein Requirements:   (63.5kgm on 1/10)          Nutrition Related Findings:    Patient reports has not been eating meals, states has not been hungry, denies GI concerns, note refusing or minimal intake of meals since admit per graphics, states intake typically one meal/ day for years, states has freezer full of food, report of drinking 6 beers per day for years PTA, reports usual weight ~160# however unsure of time frame of weight loss, states not drinking Ensure or Percy, agreeable to trial Magic Cup supplement instead of Ensure, encouraged intake of Percy to assist in wound healing  BM x 4 past 24 hours  Medication includes MVI, thiamine, zosyn, electrolyte replacement  Sodium 131, Potassium 3.1, BUN 4, Creatinine 0.6, Glucose 101    Wounds:   (right foot: frostbite; left foot: full thickness ulceration)       Current Nutrition Therapies:    ADULT DIET; Regular  ADULT ORAL NUTRITION SUPPLEMENT; Breakfast, Dinner; Wound Healing Oral Supplement  ADULT ORAL NUTRITION SUPPLEMENT; Breakfast, Lunch, Dinner; Frozen Oral Supplement    Anthropometric Measures:  · Height: 6' (182.9 cm)  · Current Body Weight: 140 lb (63.5 kg)   · Admission Body Weight: 140 lb (63.5 kg) (1/10; +2,+3 LE edema)    · Usual Body Weight:  (7/6/21 stated 150# per EMR)     · Ideal Body Weight: 178 lbs;   · BMI: 19  · BMI Categories: Normal Weight (BMI 18.5-24. 9)       Nutrition Diagnosis:   · Moderate malnutrition,In context of chronic illness related to inadequate protein-energy intake as evidenced by poor intake prior to admission,moderate muscle loss,moderate loss of subcutaneous fat      Nutrition Interventions:   Food and/or Nutrient Delivery:  Continue Current Diet,Modify Oral Nutrition Supplement  Nutrition Education/Counseling:  Education not appropriate   Coordination of Nutrition Care:  Continue to monitor while inpatient    Goals:  Patient will consume 75% or more of meals to aid in wound healing during LOS       Nutrition Monitoring and Evaluation:   Behavioral-Environmental Outcomes:  None Identified   Food/Nutrient Intake Outcomes:  Food and Nutrient Intake,Supplement Intake  Physical Signs/Symptoms Outcomes:  Biochemical Data,Fluid Status or Edema,Meal Time Behavior,Nutrition Focused Physical Findings,Skin,Weight,GI Status     Discharge Planning:     Too soon to determine     Electronically signed by Russel Pritchett RD, LD on 1/19/22 at 3:25 PM EST    Contact: [phone]

## 2022-01-19 NOTE — CARE COORDINATION
[facility] day: 9  1G 83  Procedure: 1/17 arteriogram by IR  Barriers to Discharge: discharge placement continues, podiatry follows frostbite involving all 5 digits on the right foot, and that he is going to lose all 5 digits of the right foot. Dressing care, IVF, Nitro ointment to right foot daily. Zosyn IV. K+ 3.1 with IV replacement. PCP: No primary care provider on file. Readmission Risk Score: 10.8 ( )%  Patient Goals/Plan/Treatment Preferences: Patient now wants nursing home; SW follows.

## 2022-01-19 NOTE — PROGRESS NOTES
Podiatric Progress Note  [place]  Subjective :   1/19/2022  Patient seen at bedside today on behalf of Dr. Carlie Chatterjee. Patient is awake, and is alert and oriented to person, place, and time. Patient relates no interval change in his symptoms. Patient is curious when he will have his amputation. Patient is hopeful that he is able to be put into assisted living at discharge as they have a warm room and cable TV. Patient is concerned about his ability to be functional after an amputation. Patient endorses chronic shortness of breath that is unchanged recently, but denies any nausea, vomiting, fever, chills, chest pain, shortness of breath. No other pedal complaints. 1/8/2022  Patient seen at bedside today on behalf of Dr. Carlie Chatterjee. Patient is awake, and is alert and oriented to person, place, and time. Patient continues to endorse 10/10 pain to his legs; he also endorses 10/10 pain from his lower back. Otherwise, patient denies any N/V/F/C/SOB/CP or bilateral calf tenderness. Patient is curious to know what the timeline is for his eventual amputation surgery. He has no other pedal complaints at this time. 1/17/2022  Patient seen at bedside this morning on behalf of Dr. Carlie Chatterjee. Patient is alert and oriented to person, place, and time. He is not in acute distress. Patient states that overall, he is feeling good. He endorses pain to his bilateral lower extremities, and rates the pain as 10/10. Prior to today, patient denied any pain in bilateral lower extremities and stated that he could not feel anything that is bilateral lower extremities were constantly numb. Patient asked if he could be allowed outside to smoke, as he states that the nicotine replacement therapy is not working well for him. Patient would like to know when his right foot will be amputated. Otherwise, he denies any N/V/F/C/SOB/CP or bilateral calf tenderness.   He has no other pedal complaints at this time.    1/16/2022  Patient seen at bedside today on behalf of Dr. Annamarie Barry. Patient is alert and oriented to person, place, and time. Patient is extremely displeased that he is still in the hospital. He would like to be able to go smoke. He wants his right foot amputated, and is unsure why the amputation is being delayed. He denies any nausea, vomiting, fever, chills, chest pain, shortness of breath. No other pedal complaints. 1/15/2022  Patient seen at bedside today on behalf of Dr. Annamarie Barry. Patient alert and oriented. Patient continues to deny any pain to bilateral feet. He also denies any N/V/F/C/SOB/CP. That he is still at high risk for losing the digits of the right foot. Otherwise no new complaints. 1/14/2022  Patient seen at bedside today on behalf of Dr. Annamarie Barry. Patient alert and oriented. Patient continues to deny any pain to bilateral feet. He also denies any N/V/F/C/SOB/CP or bilateral calf tenderness. Patient inquired as to which of his toes he may be a losing; reiterated to patient that he has frostbite involving all 5 digits on the right foot, and that he is going to lose all 5 digits of the right foot. Otherwise, patient has no other pedal complaints at this time. 1/13/2022  Patient seen at bedside today on behalf of Dr. Annamarie Barry. Patient was somnolent during encounter; Per sitter, patient has been sleepy all day. Patient denies any pain to bilateral feet. He currently denies any N/V/F/C/SOB/CP or bilateral calf tenderness. He has no new pedal complaints at this time. 1/12/2022  Patient seen at bedside today alongside Dr. Annamarie Barry. Patient appeared pleasant, was oriented to person, place and time and in no acute distress. Patient denies any pain to either of his feet. Patient also denies any N/V/F/C/SOB or CP. Patient has no further pedal concerns at this point in time.      HPI:  The patient is a homeless 63 y.o. male with significant past medical history of substance abuse (cocaine use, alcohol) tobacco use, and incarcerated left inguinal hernia who being seen at bedside today on behalf of Dr. Griffin Trejo. Patient was admitted yesterday via [place] [address] ED for frostbite, foot wound, and lab abnormalities. Patient is pleasant, but very poor historian. Patient does not know how long he has had frostbite changes to his right foot nor the wound on his left foot. He thinks they must have been there for several weeks. He relates that his feet have been hurting for several weeks; however when asked by RN prior to getting pain medication, patient stated he cannot feel any pain in his lower legs as they are numb all the time and all of his pain is from his lower back. Patient admits drinking approximately 6 drinks per day daily for the last several years. He currently denies any N/V/F/C/SOB/CP or bilateral calf tenderness. He has no other pedal complaints at this time.     Current Medications:    Current Facility-Administered Medications: guaiFENesin-dextromethorphan (ROBITUSSIN DM) 100-10 MG/5ML syrup 10 mL, 10 mL, Oral, Q4H PRN  ketorolac (TORADOL) injection 15 mg, 15 mg, IntraVENous, Q6H PRN  metoprolol (LOPRESSOR) injection 5 mg, 5 mg, IntraVENous, Q6H PRN  clopidogrel (PLAVIX) tablet 75 mg, 75 mg, Oral, Daily  nitroglycerin (NITRO-BID) 2 % ointment 0.5 inch, 0.5 inch, Topical, Daily  melatonin tablet 6 mg, 6 mg, Oral, Nightly PRN  hydrOXYzine (ATARAX) tablet 25 mg, 25 mg, Oral, Nightly PRN  piperacillin-tazobactam (ZOSYN) 3,375 mg in dextrose 5 % 50 mL IVPB extended infusion (mini-bag), 3,375 mg, IntraVENous, Q8H  nicotine (NICODERM CQ) 14 MG/24HR 1 patch, 1 patch, TransDERmal, Daily  haloperidol lactate (HALDOL) injection 1 mg, 1 mg, IntraMUSCular, Q6H PRN  0.9 % sodium chloride infusion, , IntraVENous, Continuous  HYDROcodone-acetaminophen (NORCO) 5-325 MG per tablet 1 tablet, 1 tablet, Oral, Q6H PRN  phosphorus replacement protocol, , Other, RX Placeholder  calcium replacement protocol, , Other, RX Placeholder  povidone-iodine (BETADINE) 10 % external solution, , Topical, PRN  sodium chloride flush 0.9 % injection 5-40 mL, 5-40 mL, IntraVENous, 2 times per day  sodium chloride flush 0.9 % injection 5-40 mL, 5-40 mL, IntraVENous, PRN  0.9 % sodium chloride infusion, 25 mL, IntraVENous, PRN  enoxaparin (LOVENOX) injection 40 mg, 40 mg, SubCUTAneous, Q24H  ondansetron (ZOFRAN-ODT) disintegrating tablet 4 mg, 4 mg, Oral, Q8H PRN **OR** ondansetron (ZOFRAN) injection 4 mg, 4 mg, IntraVENous, Q6H PRN  polyethylene glycol (GLYCOLAX) packet 17 g, 17 g, Oral, Daily PRN  acetaminophen (TYLENOL) tablet 650 mg, 650 mg, Oral, Q6H PRN **OR** acetaminophen (TYLENOL) suppository 650 mg, 650 mg, Rectal, Q6H PRN  thiamine tablet 100 mg, 100 mg, Oral, Daily  multivitamin 1 tablet, 1 tablet, Oral, Daily  potassium chloride (KLOR-CON M) extended release tablet 40 mEq, 40 mEq, Oral, PRN **OR** potassium bicarb-citric acid (EFFER-K) effervescent tablet 40 mEq, 40 mEq, Oral, PRN **OR** potassium chloride 10 mEq/100 mL IVPB (Peripheral Line), 10 mEq, IntraVENous, PRN  magnesium sulfate 2000 mg in 50 mL IVPB premix, 2,000 mg, IntraVENous, PRN  LORazepam (ATIVAN) tablet 1 mg, 1 mg, Oral, Q1H PRN **OR** LORazepam (ATIVAN) injection 1 mg, 1 mg, IntraVENous, Q1H PRN **OR** LORazepam (ATIVAN) tablet 2 mg, 2 mg, Oral, Q1H PRN **OR** LORazepam (ATIVAN) injection 2 mg, 2 mg, IntraVENous, Q1H PRN **OR** LORazepam (ATIVAN) tablet 3 mg, 3 mg, Oral, Q1H PRN **OR** LORazepam (ATIVAN) injection 3 mg, 3 mg, IntraVENous, Q1H PRN **OR** LORazepam (ATIVAN) tablet 4 mg, 4 mg, Oral, Q1H PRN **OR** LORazepam (ATIVAN) injection 4 mg, 4 mg, IntraVENous, Q1H PRN    Objective     BP (!) 155/74   Pulse 97   Temp 99.1 °F (37.3 °C) (Oral)   Resp 18   Ht 6' (1.829 m)   Wt 140 lb (63.5 kg)   SpO2 90%   BMI 18.99 kg/m²      I/O:    Intake/Output Summary (Last 24 hours) at 1/19/2022 1356  Last data filed at 1/19/2022 1228  Gross per 24 hour   Intake 2249.94 ml   Output 475 ml   Net 1774.94 ml              Wt Readings from Last 3 Encounters:   01/10/22 140 lb (63.5 kg)   07/06/21 150 lb (68 kg)   05/27/21 160 lb (72.6 kg)       LABS:    Recent Labs     01/18/22  0630 01/19/22  0730   WBC 3.0* 2.3*   HGB 9.4* 9.0*   HCT 27.8* 26.1*    151        Recent Labs     01/19/22  0730   *   K 3.1*      CO2 21*   BUN 4*   CREATININE 0.6        No results for input(s): PROT, INR, APTT in the last 72 hours. Recent Labs     01/18/22  0630   CKTOTAL 75       Focused Lower Extremity Examination:    Vitals:    BP (!) 155/74   Pulse 97   Temp 99.1 °F (37.3 °C) (Oral)   Resp 18   Ht 6' (1.829 m)   Wt 140 lb (63.5 kg)   SpO2 90%   BMI 18.99 kg/m²      Dermatologic: Dressings to BLE intact. There is a full-thickness ulceration noted to the dorsal aspect of left foot, between metatarsal necks 1 and 2. Wound bed with granular tissue with small areas of infarcted tissue. There is very scant serous drainage noted. No purulence or malodor. Wound appears stable at this time. Superficial thickness ulceration noted to distal aspect of left second digit; left second digit is mildly erythematous and edematous; erythema and edema consistent with previous. No drainage or malodor noted to superficial ulcer on the distal left second digit. On the right, there are extensive ischemic and necrotic changes to all 5 digits extending proximally to the distal metatarsal level. There is a deroofed blister noted to the dorsum of the right distal forefoot. There are superficial thickness ulcerations on the dorsal medial aspect of the right hallux and the distal tip of the left fifth digit. Digits 1 through 5 of the distal right forefoot are beginning to harden and turn very dark in coloration. There is increasing duskiness to the rest of the digits extending toward the metatarsophalangeal joints on the right foot.   Increasing evidence of necrosis to the distal lateral aspect of the patient's right foot. There does appear to be the beginnings of a line of demarcation between viable and nonviable tissue as can be seen in the photographs. There is slight malodor noted to the right forefoot. Erythema present on the proximal aspect of frostbitten tissue of right foot.     Vascular: Dorsalis pedis and posterior tibial pulses are palpable bilaterally; DP and PT pulses are diminished to the RLE. Skin temperature is warm to warm from proximal tibial tuberosity to distal digits of left foot; skin temperature is warm to  cool from proximal tibial tuberosity to distal forefoot and digits of right foot. Right foot is appreciably warmer after revascularization procedure. CFT within normal limits to all 5 digits of left foot; CFT absent to distal tips of all 5 digits of right foot. Edema is present to RLE. Pedal hair is absent bilaterally.     Neurovascular: Light touch sensation grossly diminished to the midfoot bilaterally.      Musculoskeletal: Muscle strength 4/5 and symmetric for all muscle groups crossing the ankle joint bilaterally. Decreased ROM noted at the ankle and ST joints bilaterally. No pain with palpation of any foot or ankle structures. Foot and ankle grossly rectus alignment bilaterally. STUDIES:  XR, right foot  Impression       1. Postoperative changes in the distal tibia. 2. Abnormal density involving the neck of the fifth metacarpal.   3. Degenerative changes. 4. Diffuse osteopenia. 5. Soft tissue swelling over the dorsum of foot. .                   **This report has been created using voice recognition software. It may contain minor errors which are inherent in voice recognition technology. **       Final report electronically signed by DR Ann Adair on 1/10/2022 2:03 PM      XR, left foot  Impression       1.  Stable left foot radiographs, no interval change since previous study dated 30 January 2020.   2. Abnormal density involving the first metatarsophalangeal joint, unchanged. 3. Degenerative change. 4. No definite evidence of fracture, bony destruction or osteomyelitis. .                   **This report has been created using voice recognition software. It may contain minor errors which are inherent in voice recognition technology. **       Final report electronically signed by DR Rajan Douglas on 1/10/2022 2:00 PM      CTA, abdominal aorta with bilateral runoff  Impression       1. Limited evaluation of the right leg secondary to patient motion artifact. 2. Relatively normal flow in the abdominal aorta, visceral arteries, right and left common, internal and external iliac arteries common femoral arteries. 3. There is an area of significant narrowing in the distal right superficial femoral artery. 4. There is relatively normal flow in the left common and superficial femoral, popliteal, anterior and posterior tibial arteries. 5. Intramedullary jaleesa in the right tibia-fibula. 6. Mild diffuse fatty replacement in the liver. 7. Punctate calcifications in spleen. 8. Small bilateral adrenal nodules. 9. Atrophic pancreas. Dilated pancreatic duct. 10. 2.2 cm fluid collection in the region of the splenic hilum. 11. Enlarged prostate gland.           **This report has been created using voice recognition software. It may contain minor errors which are inherent in voice recognition technology. **       Final report electronically signed by DR Rajan Douglas on 1/10/2022 9:10 PM     MRI, left foot  Impression       1. The soft tissue ulcer appears to approximate the proximal phalanx of the first digit. Soft tissue gas is noted. Osteomyelitis cannot be excluded.       2. Increased bone marrow signal on STIR without corresponding T1 hypointensity in the first metatarsal is a nonspecific findings. Likely relates to reactive edema at this time.  The finding is equivocal for osteomyelitis.       3. Linear hyperintensity and STIR in the base of the proximal phalanx of the fifth digit without corresponding T1 hypointensity likely relates to reactive bone marrow edema versus a stress reaction. Clinical correlation is recommended.       4. Marked degenerative changes of the first metatarsophalangeal joint. Hallux valgus deformity.               **This report has been created using voice recognition software.  It may contain minor errors which are inherent in voice recognition technology. **       Final report electronically signed by Dr Asa Jiang on 1/13/2022 11:52 AM     Lower extremity arterial Doppler, bilateral  Impression   1. Normal findings in the left leg. 2. Abnormal findings in the right leg,. Findings suggestive of mild stenosis at the origin of the profunda and possibly at the origin right SFA. Total occlusion mid right SFA with distal collateral reconstitution. Also evidence for trifurcation disease    right side. 3. Aortofemoral angiography is recommended for further evaluation with possible intervention. If desired, this can be arranged through the interventional scheduling desk of [facility]'s radiology department ([phone]).             **This report has been created using voice recognition software.  It may contain minor errors which are inherent in voice recognition technology. **       Final report electronically signed by Dr. Tiana Mckeon on 1/14/2022 11:14 AM       ASSESSMENT: Pt. is a 63 y.o. male with:  Principle  Frostbite, right foot  Full-thickness ulceration, left foot    Chronic  Patient Active Problem List   Diagnosis    Incarcerated left inguinal hernia    Tobacco dependence    Substance induced mood disorder ([facility] [address]    Foot pain, bilateral    Wound infection       PLAN:   Frostbite, right foot  Full-thickness ulceration, left foot  -Patient examined and evaluated.   -WBC 2.3; patient afebrile  -Blood cultures 1 and 2, preliminary results: No growth  -Swab cultures results: Staphylococcus aureus and Pseudomonas aeruginosa  -Continue IV zosyn per primary  -Reviewed MRI; impression above. Results equivocal for osteomyelitis. No definite changes consistent with osteomyelitis were noted. -Reviewed lower extremity arterial Doppler; impression above  -Patient is 2 days s/p angiogram with intervention tomorrow, he  -Applied nitroglycerin paste to right forefoot, Betadine wet-to-dry to distal right forefoot; wrapped loosely with Kerlix, betadine to wounds of left foot with loosely applied kerlix.  -Patient okay to weight-bear as tolerated while wearing the postop shoe; encouraged patient to minimize weightbearing  -Placed order for daily dressing changes with nitroglycerin paste to the dorsal aspect of the foot bilaterally, Betadine wet-to-dry to distal right forefoot and wound to dorsum of left forefoot wrapped with Kerlix rolls.  -Discussed with patient that it is dangerous to perform amputation before demarcation of skin. There is significant risk that amputation margins will become necrotic and that he will require more proximal amputation  -We will wait for clean demarcation of viable versus nonviable tissue before performing definitive amputation. There is no urgent need for performing definitive amputation. This can be performed outpatient.  -Patient is okay for discharge provided that he will be able to receive daily dressing changes as above. Patient may follow-up outpatient with Dr. Griffin Trejo for determining level of demarcation of viable versus nonviable tissue, and definitive amputation.  -Patient verbalized understanding and agreement with the treatment plan as stated. All of his questions were answered to satisfaction. DISPO: Patient is stable for discharge per podiatry, follow-up outpatient with Dr. Griffin Trejo, will need close follow-up upon discharge to ensure wounds of bilateral feet do not get infected and require more urgent amputation.     Kane Mccall DPM, PGY-2  Podiatric Surgical

## 2022-01-19 NOTE — PLAN OF CARE
Pt complaining of bottom being sore. Pt states he has a rash no his bottom. Pt refusing to turn for this RN to assess. Pt also refusing cream for bottom.

## 2022-01-19 NOTE — PROCEDURES
A Bladder scan was performed at 0345 . The patient's last void was at 0340 . The residual amount was measured to be 17 ML. Report of results was given to [facility] [place].

## 2022-01-19 NOTE — CARE COORDINATION
1/19/22, 2:46 PM EST    DISCHARGE PLANNING EVALUATION      Spoke with Fela Delaney. He now wants to go to assisted living. Explained that insurance does not pay for assisted living. After discussion, he states he wants to go to a nursing home. Will investigate options for ecf.

## 2022-01-20 LAB
ANION GAP SERPL CALCULATED.3IONS-SCNC: 11 MEQ/L (ref 8–16)
ATYPICAL LYMPHOCYTES: ABNORMAL %
BASOPHILS # BLD: 1 %
BASOPHILS ABSOLUTE: 0 THOU/MM3 (ref 0–0.1)
BUN BLDV-MCNC: 4 MG/DL (ref 7–22)
CALCIUM SERPL-MCNC: 7.8 MG/DL (ref 8.5–10.5)
CHLORIDE BLD-SCNC: 101 MEQ/L (ref 98–111)
CO2: 20 MEQ/L (ref 23–33)
CREAT SERPL-MCNC: 0.5 MG/DL (ref 0.4–1.2)
EOSINOPHIL # BLD: 1 %
EOSINOPHILS ABSOLUTE: 0 THOU/MM3 (ref 0–0.4)
ERYTHROCYTE [DISTWIDTH] IN BLOOD BY AUTOMATED COUNT: 14.6 % (ref 11.5–14.5)
ERYTHROCYTE [DISTWIDTH] IN BLOOD BY AUTOMATED COUNT: 54.1 FL (ref 35–45)
GFR SERPL CREATININE-BSD FRML MDRD: > 90 ML/MIN/1.73M2
GLUCOSE BLD-MCNC: 94 MG/DL (ref 70–108)
HCT VFR BLD CALC: 27.4 % (ref 42–52)
HEMOGLOBIN: 9.4 GM/DL (ref 14–18)
IMMATURE GRANS (ABS): 0.02 THOU/MM3 (ref 0–0.07)
IMMATURE GRANULOCYTES: 1 %
LYMPHOCYTES # BLD: 39.4 %
LYMPHOCYTES ABSOLUTE: 0.8 THOU/MM3 (ref 1–4.8)
MAGNESIUM: 1.4 MG/DL (ref 1.6–2.4)
MCH RBC QN AUTO: 34.7 PG (ref 26–33)
MCHC RBC AUTO-ENTMCNC: 34.3 GM/DL (ref 32.2–35.5)
MCV RBC AUTO: 101.1 FL (ref 80–94)
MONOCYTES # BLD: 10.6 %
MONOCYTES ABSOLUTE: 0.2 THOU/MM3 (ref 0.4–1.3)
NUCLEATED RED BLOOD CELLS: 0 /100 WBC
PLATELET # BLD: 158 THOU/MM3 (ref 130–400)
PLATELET ESTIMATE: ADEQUATE
PMV BLD AUTO: 11 FL (ref 9.4–12.4)
POTASSIUM SERPL-SCNC: 2.9 MEQ/L (ref 3.5–5.2)
POTASSIUM SERPL-SCNC: 3.2 MEQ/L (ref 3.5–5.2)
RBC # BLD: 2.71 MILL/MM3 (ref 4.7–6.1)
ROULEAUX: SLIGHT
SCAN OF BLOOD SMEAR: NORMAL
SEG NEUTROPHILS: 47 %
SEGMENTED NEUTROPHILS ABSOLUTE COUNT: 0.9 THOU/MM3 (ref 1.8–7.7)
SODIUM BLD-SCNC: 132 MEQ/L (ref 135–145)
WBC # BLD: 2 THOU/MM3 (ref 4.8–10.8)

## 2022-01-20 PROCEDURE — 83735 ASSAY OF MAGNESIUM: CPT

## 2022-01-20 PROCEDURE — 2580000003 HC RX 258: Performed by: HOSPITALIST

## 2022-01-20 PROCEDURE — 1200000000 HC SEMI PRIVATE

## 2022-01-20 PROCEDURE — 6370000000 HC RX 637 (ALT 250 FOR IP): Performed by: INTERNAL MEDICINE

## 2022-01-20 PROCEDURE — 6360000002 HC RX W HCPCS: Performed by: INTERNAL MEDICINE

## 2022-01-20 PROCEDURE — 84132 ASSAY OF SERUM POTASSIUM: CPT

## 2022-01-20 PROCEDURE — 6370000000 HC RX 637 (ALT 250 FOR IP): Performed by: STUDENT IN AN ORGANIZED HEALTH CARE EDUCATION/TRAINING PROGRAM

## 2022-01-20 PROCEDURE — 6370000000 HC RX 637 (ALT 250 FOR IP): Performed by: RADIOLOGY

## 2022-01-20 PROCEDURE — 85025 COMPLETE CBC W/AUTO DIFF WBC: CPT

## 2022-01-20 PROCEDURE — 80048 BASIC METABOLIC PNL TOTAL CA: CPT

## 2022-01-20 PROCEDURE — 99232 SBSQ HOSP IP/OBS MODERATE 35: CPT | Performed by: INTERNAL MEDICINE

## 2022-01-20 PROCEDURE — 36415 COLL VENOUS BLD VENIPUNCTURE: CPT

## 2022-01-20 RX ADMIN — HYDROCODONE BITARTRATE AND ACETAMINOPHEN 1 TABLET: 5; 325 TABLET ORAL at 22:20

## 2022-01-20 RX ADMIN — AMOXICILLIN AND CLAVULANATE POTASSIUM 1 TABLET: 875; 125 TABLET, FILM COATED ORAL at 08:48

## 2022-01-20 RX ADMIN — GUAIFENESIN AND DEXTROMETHORPHAN 10 ML: 100; 10 SYRUP ORAL at 16:09

## 2022-01-20 RX ADMIN — POTASSIUM CHLORIDE 10 MEQ: 7.46 INJECTION, SOLUTION INTRAVENOUS at 08:43

## 2022-01-20 RX ADMIN — Medication 1 TABLET: at 08:46

## 2022-01-20 RX ADMIN — POTASSIUM CHLORIDE 10 MEQ: 7.46 INJECTION, SOLUTION INTRAVENOUS at 14:53

## 2022-01-20 RX ADMIN — POTASSIUM CHLORIDE 10 MEQ: 7.46 INJECTION, SOLUTION INTRAVENOUS at 13:20

## 2022-01-20 RX ADMIN — POTASSIUM CHLORIDE 10 MEQ: 7.46 INJECTION, SOLUTION INTRAVENOUS at 10:54

## 2022-01-20 RX ADMIN — HYDROCODONE BITARTRATE AND ACETAMINOPHEN 1 TABLET: 5; 325 TABLET ORAL at 08:50

## 2022-01-20 RX ADMIN — POTASSIUM CHLORIDE 10 MEQ: 7.46 INJECTION, SOLUTION INTRAVENOUS at 09:52

## 2022-01-20 RX ADMIN — HYDROCODONE BITARTRATE AND ACETAMINOPHEN 1 TABLET: 5; 325 TABLET ORAL at 16:09

## 2022-01-20 RX ADMIN — CLOPIDOGREL BISULFATE 75 MG: 75 TABLET, FILM COATED ORAL at 08:46

## 2022-01-20 RX ADMIN — ENOXAPARIN SODIUM 40 MG: 100 INJECTION SUBCUTANEOUS at 22:20

## 2022-01-20 RX ADMIN — POTASSIUM CHLORIDE 10 MEQ: 7.46 INJECTION, SOLUTION INTRAVENOUS at 12:17

## 2022-01-20 RX ADMIN — AMOXICILLIN AND CLAVULANATE POTASSIUM 1 TABLET: 875; 125 TABLET, FILM COATED ORAL at 22:20

## 2022-01-20 RX ADMIN — Medication 100 MG: at 08:46

## 2022-01-20 RX ADMIN — SODIUM CHLORIDE: 9 INJECTION, SOLUTION INTRAVENOUS at 22:53

## 2022-01-20 ASSESSMENT — PAIN DESCRIPTION - PROGRESSION: CLINICAL_PROGRESSION: NOT CHANGED

## 2022-01-20 ASSESSMENT — PAIN SCALES - GENERAL
PAINLEVEL_OUTOF10: 7
PAINLEVEL_OUTOF10: 10
PAINLEVEL_OUTOF10: 8
PAINLEVEL_OUTOF10: 10

## 2022-01-20 ASSESSMENT — PAIN DESCRIPTION - DESCRIPTORS: DESCRIPTORS: ACHING

## 2022-01-20 ASSESSMENT — PAIN DESCRIPTION - PAIN TYPE
TYPE: CHRONIC PAIN
TYPE: CHRONIC PAIN

## 2022-01-20 ASSESSMENT — PAIN DESCRIPTION - ONSET: ONSET: ON-GOING

## 2022-01-20 ASSESSMENT — PAIN DESCRIPTION - LOCATION
LOCATION: BACK
LOCATION: BACK

## 2022-01-20 ASSESSMENT — PAIN DESCRIPTION - ORIENTATION
ORIENTATION: LOWER
ORIENTATION: LOWER

## 2022-01-20 ASSESSMENT — PAIN DESCRIPTION - FREQUENCY: FREQUENCY: CONTINUOUS

## 2022-01-20 NOTE — PROGRESS NOTES
Podiatric Progress Note  Kelvin Limon  Subjective :   1/20/2022  Patient seen at bedside today on behalf of Dr. Jeniffer Rashid. Patient is sleeping. After being awoken, patient is alert and oriented to person, place, and time. Patient denies any interval changes in his symptoms since yesterday's encounter. Patient was curious as to when he will be discharged and where he will be discharged to home. He denies any N/V/F/C/SOB/CP or bilateral calf tenderness. He has no other pedal complaints at this time. 1/19/2022  Patient seen at bedside today on behalf of Dr. Jeniffer Rashid. Patient is awake, and is alert and oriented to person, place, and time. Patient relates no interval change in his symptoms. Patient is curious when he will have his amputation. Patient is hopeful that he is able to be put into assisted living at discharge as they have a warm room and cable TV. Patient is concerned about his ability to be functional after an amputation. Patient endorses chronic shortness of breath that is unchanged recently, but denies any nausea, vomiting, fever, chills, chest pain, shortness of breath. No other pedal complaints. 1/8/2022  Patient seen at bedside today on behalf of Dr. Jeniffer Rashid. Patient is awake, and is alert and oriented to person, place, and time. Patient continues to endorse 10/10 pain to his legs; he also endorses 10/10 pain from his lower back. Otherwise, patient denies any N/V/F/C/SOB/CP or bilateral calf tenderness. Patient is curious to know what the timeline is for his eventual amputation surgery. He has no other pedal complaints at this time. 1/17/2022  Patient seen at bedside this morning on behalf of Dr. Jeniffer Rashid. Patient is alert and oriented to person, place, and time. He is not in acute distress. Patient states that overall, he is feeling good. He endorses pain to his bilateral lower extremities, and rates the pain as 10/10.   Prior to today, patient denied any pain in bilateral lower extremities and stated that he could not feel anything that is bilateral lower extremities were constantly numb. Patient asked if he could be allowed outside to smoke, as he states that the nicotine replacement therapy is not working well for him. Patient would like to know when his right foot will be amputated. Otherwise, he denies any N/V/F/C/SOB/CP or bilateral calf tenderness. He has no other pedal complaints at this time. 1/16/2022  Patient seen at bedside today on behalf of Dr. Bharati Porter. Patient is alert and oriented to person, place, and time. Patient is extremely displeased that he is still in the hospital. He would like to be able to go smoke. He wants his right foot amputated, and is unsure why the amputation is being delayed. He denies any nausea, vomiting, fever, chills, chest pain, shortness of breath. No other pedal complaints. 1/15/2022  Patient seen at bedside today on behalf of Dr. Bharati Porter. Patient alert and oriented. Patient continues to deny any pain to bilateral feet. He also denies any N/V/F/C/SOB/CP. That he is still at high risk for losing the digits of the right foot. Otherwise no new complaints. 1/14/2022  Patient seen at bedside today on behalf of Dr. Bharati Porter. Patient alert and oriented. Patient continues to deny any pain to bilateral feet. He also denies any N/V/F/C/SOB/CP or bilateral calf tenderness. Patient inquired as to which of his toes he may be a losing; reiterated to patient that he has frostbite involving all 5 digits on the right foot, and that he is going to lose all 5 digits of the right foot. Otherwise, patient has no other pedal complaints at this time. 1/13/2022  Patient seen at bedside today on behalf of Dr. Bharati Porter. Patient was somnolent during encounter; Per sitter, patient has been sleepy all day. Patient denies any pain to bilateral feet. He currently denies any N/V/F/C/SOB/CP or bilateral calf tenderness.   He has no new pedal complaints at this time. 1/12/2022  Patient seen at bedside today alongside Dr. Navneet Parker. Patient appeared pleasant, was oriented to person, place and time and in no acute distress. Patient denies any pain to either of his feet. Patient also denies any N/V/F/C/SOB or CP. Patient has no further pedal concerns at this point in time. HPI:  The patient is a homeless 63 y.o. male with significant past medical history of substance abuse (cocaine use, alcohol) tobacco use, and incarcerated left inguinal hernia who being seen at bedside today on behalf of Dr. Navneet Parker. Patient was admitted yesterday via [place] [address] ED for frostbite, foot wound, and lab abnormalities. Patient is pleasant, but very poor historian. Patient does not know how long he has had frostbite changes to his right foot nor the wound on his left foot. He thinks they must have been there for several weeks. He relates that his feet have been hurting for several weeks; however when asked by RN prior to getting pain medication, patient stated he cannot feel any pain in his lower legs as they are numb all the time and all of his pain is from his lower back. Patient admits drinking approximately 6 drinks per day daily for the last several years. He currently denies any N/V/F/C/SOB/CP or bilateral calf tenderness. He has no other pedal complaints at this time.     Current Medications:    Current Facility-Administered Medications: guaiFENesin-dextromethorphan (ROBITUSSIN DM) 100-10 MG/5ML syrup 10 mL, 10 mL, Oral, Q4H PRN  amoxicillin-clavulanate (AUGMENTIN) 875-125 MG per tablet 1 tablet, 1 tablet, Oral, 2 times per day  ketorolac (TORADOL) injection 15 mg, 15 mg, IntraVENous, Q6H PRN  metoprolol (LOPRESSOR) injection 5 mg, 5 mg, IntraVENous, Q6H PRN  clopidogrel (PLAVIX) tablet 75 mg, 75 mg, Oral, Daily  nitroglycerin (NITRO-BID) 2 % ointment 0.5 inch, 0.5 inch, Topical, Daily  melatonin tablet 6 mg, 6 mg, Oral, Nightly PRN  hydrOXYzine (ATARAX) tablet 25 mg, 25 mg, Oral, Nightly PRN  nicotine (NICODERM CQ) 14 MG/24HR 1 patch, 1 patch, TransDERmal, Daily  haloperidol lactate (HALDOL) injection 1 mg, 1 mg, IntraMUSCular, Q6H PRN  0.9 % sodium chloride infusion, , IntraVENous, Continuous  HYDROcodone-acetaminophen (NORCO) 5-325 MG per tablet 1 tablet, 1 tablet, Oral, Q6H PRN  phosphorus replacement protocol, , Other, RX Placeholder  calcium replacement protocol, , Other, RX Placeholder  povidone-iodine (BETADINE) 10 % external solution, , Topical, PRN  sodium chloride flush 0.9 % injection 5-40 mL, 5-40 mL, IntraVENous, 2 times per day  sodium chloride flush 0.9 % injection 5-40 mL, 5-40 mL, IntraVENous, PRN  0.9 % sodium chloride infusion, 25 mL, IntraVENous, PRN  enoxaparin (LOVENOX) injection 40 mg, 40 mg, SubCUTAneous, Q24H  ondansetron (ZOFRAN-ODT) disintegrating tablet 4 mg, 4 mg, Oral, Q8H PRN **OR** ondansetron (ZOFRAN) injection 4 mg, 4 mg, IntraVENous, Q6H PRN  polyethylene glycol (GLYCOLAX) packet 17 g, 17 g, Oral, Daily PRN  acetaminophen (TYLENOL) tablet 650 mg, 650 mg, Oral, Q6H PRN **OR** acetaminophen (TYLENOL) suppository 650 mg, 650 mg, Rectal, Q6H PRN  thiamine tablet 100 mg, 100 mg, Oral, Daily  multivitamin 1 tablet, 1 tablet, Oral, Daily  potassium chloride (KLOR-CON M) extended release tablet 40 mEq, 40 mEq, Oral, PRN **OR** potassium bicarb-citric acid (EFFER-K) effervescent tablet 40 mEq, 40 mEq, Oral, PRN **OR** potassium chloride 10 mEq/100 mL IVPB (Peripheral Line), 10 mEq, IntraVENous, PRN  magnesium sulfate 2000 mg in 50 mL IVPB premix, 2,000 mg, IntraVENous, PRN  LORazepam (ATIVAN) tablet 1 mg, 1 mg, Oral, Q1H PRN **OR** LORazepam (ATIVAN) injection 1 mg, 1 mg, IntraVENous, Q1H PRN **OR** LORazepam (ATIVAN) tablet 2 mg, 2 mg, Oral, Q1H PRN **OR** LORazepam (ATIVAN) injection 2 mg, 2 mg, IntraVENous, Q1H PRN **OR** LORazepam (ATIVAN) tablet 3 mg, 3 mg, Oral, Q1H PRN **OR** LORazepam (ATIVAN) injection 3 mg, 3 mg, IntraVENous, Q1H PRN **OR** LORazepam (ATIVAN) tablet 4 mg, 4 mg, Oral, Q1H PRN **OR** LORazepam (ATIVAN) injection 4 mg, 4 mg, IntraVENous, Q1H PRN    Objective     BP (!) 142/76   Pulse 72   Temp 98.2 °F (36.8 °C) (Oral)   Resp 18   Ht 6' (1.829 m)   Wt 140 lb (63.5 kg)   SpO2 94%   BMI 18.99 kg/m²      I/O:    Intake/Output Summary (Last 24 hours) at 1/20/2022 1604  Last data filed at 1/20/2022 1451  Gross per 24 hour   Intake 2707.88 ml   Output 1350 ml   Net 1357.88 ml              Wt Readings from Last 3 Encounters:   01/10/22 140 lb (63.5 kg)   07/06/21 150 lb (68 kg)   05/27/21 160 lb (72.6 kg)       LABS:    Recent Labs     01/19/22  0730 01/20/22  0632   WBC 2.3* 2.0*   HGB 9.0* 9.4*   HCT 26.1* 27.4*    158        Recent Labs     01/20/22  0632 01/20/22  0632 01/20/22  1114   *  --   --    K 2.9*   < > 3.2*     --   --    CO2 20*  --   --    BUN 4*  --   --    CREATININE 0.5  --   --     < > = values in this interval not displayed. No results for input(s): PROT, INR, APTT in the last 72 hours. Recent Labs     01/18/22  0630   CKTOTAL 75       Focused Lower Extremity Examination:    Vitals:    BP (!) 142/76   Pulse 72   Temp 98.2 °F (36.8 °C) (Oral)   Resp 18   Ht 6' (1.829 m)   Wt 140 lb (63.5 kg)   SpO2 94%   BMI 18.99 kg/m²      Dermatologic: Dressings to BLE intact. There is a full-thickness ulceration noted to the dorsal aspect of left foot, between metatarsal necks 1 and 2. Wound bed with granular tissue with small areas of infarcted tissue. There is very scant serous drainage noted. No purulence or malodor. Wound appears stable at this time. Superficial thickness ulceration noted to distal aspect of left second digit; left second digit is mildly erythematous and edematous; erythema and edema consistent with previous. No drainage or malodor noted to superficial ulcer on the distal left second digit.       On the right, there are extensive ischemic and necrotic changes to all 5 digits extending proximally to the distal metatarsal level. There is a deroofed blister noted to the dorsum of the right distal forefoot. There are superficial thickness ulcerations on the dorsal medial aspect of the right hallux and the distal tip of the left fifth digit. Digits 1 through 5 of the distal right forefoot are beginning to harden and turn very dark in coloration. There is increasing duskiness to the rest of the digits extending toward the metatarsophalangeal joints on the right foot. There is increasing necrosis noted to the distal lateral aspect of the patient's right foot. There does appear to be the beginnings of a line of demarcation between viable and nonviable tissue as can be seen in the photographs. There is slight malodor noted to the right forefoot. Erythema present on the proximal aspect of frostbitten tissue of right foot.     Vascular: Dorsalis pedis and posterior tibial pulses are palpable bilaterally; DP and PT pulses are diminished to the RLE. Skin temperature is warm to warm from proximal tibial tuberosity to distal digits of left foot; skin temperature is warm to  cool from proximal tibial tuberosity to distal forefoot and digits of right foot. Right foot is appreciably warmer after revascularization procedure. CFT within normal limits to all 5 digits of left foot; CFT absent to distal tips of all 5 digits of right foot. Edema is present to RLE. Pedal hair is absent bilaterally.     Neurovascular: Light touch sensation grossly diminished to the midfoot bilaterally.      Musculoskeletal: Muscle strength 4/5 and symmetric for all muscle groups crossing the ankle joint bilaterally. Decreased ROM noted at the ankle and ST joints bilaterally. No pain with palpation of any foot or ankle structures. Foot and ankle grossly rectus alignment bilaterally. STUDIES:  XR, right foot  Impression       1. Postoperative changes in the distal tibia.    2. Abnormal density involving the neck of the fifth metacarpal.   3. Degenerative changes. 4. Diffuse osteopenia. 5. Soft tissue swelling over the dorsum of foot. .                   **This report has been created using voice recognition software. It may contain minor errors which are inherent in voice recognition technology. **       Final report electronically signed by DR Elisha Fowler on 1/10/2022 2:03 PM      XR, left foot  Impression       1. Stable left foot radiographs, no interval change since previous study dated 30 January 2020.   2. Abnormal density involving the first metatarsophalangeal joint, unchanged. 3. Degenerative change. 4. No definite evidence of fracture, bony destruction or osteomyelitis. .                   **This report has been created using voice recognition software. It may contain minor errors which are inherent in voice recognition technology. **       Final report electronically signed by DR Elisha Fowler on 1/10/2022 2:00 PM      CTA, abdominal aorta with bilateral runoff  Impression       1. Limited evaluation of the right leg secondary to patient motion artifact. 2. Relatively normal flow in the abdominal aorta, visceral arteries, right and left common, internal and external iliac arteries common femoral arteries. 3. There is an area of significant narrowing in the distal right superficial femoral artery. 4. There is relatively normal flow in the left common and superficial femoral, popliteal, anterior and posterior tibial arteries. 5. Intramedullary jaleesa in the right tibia-fibula. 6. Mild diffuse fatty replacement in the liver. 7. Punctate calcifications in spleen. 8. Small bilateral adrenal nodules. 9. Atrophic pancreas. Dilated pancreatic duct. 10. 2.2 cm fluid collection in the region of the splenic hilum. 11. Enlarged prostate gland.           **This report has been created using voice recognition software.  It may contain minor errors which are inherent in voice recognition technology. **       Final report electronically signed by DR Deven Melendez on 1/10/2022 9:10 PM     MRI, left foot  Impression       1. The soft tissue ulcer appears to approximate the proximal phalanx of the first digit. Soft tissue gas is noted. Osteomyelitis cannot be excluded.       2. Increased bone marrow signal on STIR without corresponding T1 hypointensity in the first metatarsal is a nonspecific findings. Likely relates to reactive edema at this time. The finding is equivocal for osteomyelitis.       3. Linear hyperintensity and STIR in the base of the proximal phalanx of the fifth digit without corresponding T1 hypointensity likely relates to reactive bone marrow edema versus a stress reaction. Clinical correlation is recommended.       4. Marked degenerative changes of the first metatarsophalangeal joint. Hallux valgus deformity.               **This report has been created using voice recognition software.  It may contain minor errors which are inherent in voice recognition technology. **       Final report electronically signed by Dr Bruno Klein on 1/13/2022 11:52 AM     Lower extremity arterial Doppler, bilateral  Impression   1. Normal findings in the left leg. 2. Abnormal findings in the right leg,. Findings suggestive of mild stenosis at the origin of the profunda and possibly at the origin right SFA. Total occlusion mid right SFA with distal collateral reconstitution. Also evidence for trifurcation disease    right side. 3. Aortofemoral angiography is recommended for further evaluation with possible intervention. If desired, this can be arranged through the interventional scheduling desk of [facility]'s radiology department ([phone]).             **This report has been created using voice recognition software.  It may contain minor errors which are inherent in voice recognition technology. **       Final report electronically signed by Dr. Leo Schwab on 1/14/2022 11:14 AM ASSESSMENT: Pt. is a 63 y.o. male with:  Principle  Frostbite, right foot  Full-thickness ulceration, left foot    Chronic  Patient Active Problem List   Diagnosis    Incarcerated left inguinal hernia    Tobacco dependence    Substance induced mood disorder ([facility] [address]    Foot pain, bilateral    Wound infection    Moderate malnutrition ([address]       PLAN:   Frostbite, right foot  Full-thickness ulceration, left foot  -Patient examined and evaluated. -WBC 2.0; patient afebrile  -Blood cultures 1 and 2, preliminary results: No growth  -Swab cultures results: Staphylococcus aureus and Pseudomonas aeruginosa  -IV Zosyn discontinued; patient on p.o. Augmentin  -Reviewed MRI; impression above. Results equivocal for osteomyelitis. No definite changes consistent with osteomyelitis were noted. -Reviewed lower extremity arterial Doppler; impression above  -Patient is 3 days s/p angiogram with intervention  -Applied nitroglycerin paste to right forefoot, Betadine wet-to-dry to distal right forefoot; wrapped loosely with Kerlix, betadine to wounds of left foot with loosely applied kerlix.  -Patient okay to weight-bear as tolerated while wearing the postop shoe; encouraged patient to minimize weightbearing  -Placed order for daily dressing changes with nitroglycerin paste to the dorsal aspect of the foot bilaterally, Betadine wet-to-dry to distal right forefoot and wound to dorsum of left forefoot wrapped with Kerlix rolls.  -Discussed with patient that it is dangerous to perform amputation before demarcation of skin. There is significant risk that amputation margins will become necrotic and that he will require more proximal amputation  -We will wait for clean demarcation of viable versus nonviable tissue before performing definitive amputation. There is no urgent need for performing definitive amputation.  This can be performed outpatient.  -Patient is okay for discharge provided that he will be able to receive daily dressing changes as above. Patient may follow-up outpatient with Dr. Maria Dolores Sotelo for determining level of demarcation of viable versus nonviable tissue, and definitive amputation.  -Patient verbalized understanding and agreement with the treatment plan as stated. All of his questions were answered to satisfaction. DISPO: Patient is stable for discharge per podiatry, follow-up outpatient with Dr. Maria Dolores Sotelo, will need close follow-up upon discharge to ensure wounds of bilateral feet do not get infected and require more urgent amputation.     Grazyna Polk DPM, PGY-2  Podiatric Surgical Resident  1/20/2022   4:04 PM

## 2022-01-20 NOTE — PROGRESS NOTES
Patient bandages soiled and unraveled d/t frequent patient ambulation through out shift. Writer changed bandages to bilateral feet at this time per orders. Patient tolerated PRN dressing changes and voiced no complaints of pain or discomfort during change. Tx complete and patient resting in chair with video monitor in place for safety, chair alarm on, call light in reach and room adequately lit. Will continue to monitor. Rose Newton RN.

## 2022-01-20 NOTE — PROGRESS NOTES
HOSPITALIST PROGRESS NOTE    Patient:  Aida Serrano    Unit/Bed:7K-27/027-A  YOB: 1960  MRN: [id number]   PCP: No primary care provider on file. Date of Admission: 1/10/2022  Date of Service: 1/20/2022  Chief Complaint:- Foot pain    Assessment and Plan:  1. Bilateral LE Ulcers/Eschar/?Frostbite on RLE: Patient presents with what appears to be frostbite of the right foot as well as a full-thickness ulceration of the left foot. Patient is afebrile however leukocytosis was present on arrival.  All digits of the right foot appear necrotic, foul-smelling and gangrenous which extends to the metatarsal level. The left foot has an open ulcer on the dorsal aspect near the second digit however does not appear to be necrotic appearing as the right foot. CTA of the abdominal aorta with bilateral runoff shows an area of significant narrowing in the distal right superficial femoral artery with what appears to be relatively normal flow in the left LE circulation. Podiatry consulted and performing daily wound care/Betadine debridement. MRI 1/13/2022 reveals what appears to be osteomyelitis in the first digit and metatarsal and soft tissue gas as noted by radiologist.  Patient underwent RLE arteriogram with angioplasty on 1/17/2022 by IR. · As per podiatry, plan for eventual TMA as outpatient. No indication for immediate amputation at this time. · Continue Augmentin. No further indication for pseudomonal coverage given length of therapy with Zosyn. Augmentin will continue for another 7 days for full course of treatment for deep skin infections. 2. 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 Iron studies done suggest inflammatory anemia. Benign reticulocyte count, however patient has been having a drop in his chronic macrocytic hemoglobin compared to his usual baseline.   We will continue to monitor/trend, unclear if patient will follow up with specialist as outpatient but will make suggestion if need be.  3. History of EtOH Abuse: Patient no longer agitated as seen in alcohol abuse. Sitter at bedside. 4. Diffuse Alcoholic Fatty Liver Disease without Cirrhosis: Evidence for acute EtOH toxic insult due to mild transaminitis with AST > ALT. Both now downtrending, indicating recovery from EtOH toxic insult. INR 1.56. Platelet count greater than 150,000. Ultrasound of the liver shows fatty infiltration with concurrent hypoechogenicity, normal color flow via Doppler studies. No CBD dilatation. Patient at risk for developing fibrotic changes with continued drinking and eventual cirrhosis. 5. Severe Hypoalbuminemia: Due to severe malnutrition and chronic EtOH use. · IV albumin x3 doses given on 1/12-1/13/2022   · Dietitian following  6. Moderate Acute on Chronic Asymptomatic Hypovolemic Hyponatremia: Plateaued now, stable nearing baseline. Current still Na 131. Urine Na > 20, Urine osm > 100. · Continue with IV NS.  7. Mild Rhabdomyolysis: Resolved. 8. Disposition: Patient ok for discharge per podiatry. However patient requested assisted living, which won't be approved due to insurance, and wishes to now go to a nursing home. SW currently investigating options for ECF. Subjective (Past 24 hour events):  No acute events overnight. Sitter at bedside. Patient quiet as usual, no new complaints or changes in ROS. HPI:  65-year-old homeless male with PMH of EtOH and cocaine abuse who came to the ED on 1/10/2022 due to bilateral lower extremity pain and numbness. On arrival patient was noted to be an extremely poor historian. Patient did state that he has had lower extremity numbness and pain for about 2 weeks. Not sure how long he has had ulcers in the foot. Admits to drinking every day, about 6 drinks per day for several years. Denied any chronic medical problems.   Physical exam in the ED showed bilateral lower extremity eschars along with ulcers and discolored toes. Admission labs showed multiple significant abnormalities which included hyponatremia, hypokalemia, hyperbilirubinemia leukocytosis, macrocytic anemia, rhabdomyolysis. Patient admitted for these bilateral ulcers/eschar and multiple lab abnormalities. Please see Assessment and Plan for further details on hospital course. Scheduled Meds:   amoxicillin-clavulanate  1 tablet Oral 2 times per day    clopidogrel  75 mg Oral Daily    nitroglycerin  0.5 inch Topical Daily    nicotine  1 patch TransDERmal Daily    phosphorus replacement protocol   Other RX Placeholder    calcium replacement protocol   Other RX Placeholder    sodium chloride flush  5-40 mL IntraVENous 2 times per day    enoxaparin  40 mg SubCUTAneous Q24H    thiamine  100 mg Oral Daily    multivitamin  1 tablet Oral Daily     Continuous Infusions:   sodium chloride Stopped (01/20/22 0743)    sodium chloride         PHYSICAL EXAMINATION:  Blood pressure (!) 142/76, pulse 72, temperature 98.2 °F (36.8 °C), temperature source Oral, resp. rate 18, height 6' (1.829 m), weight 140 lb (63.5 kg), SpO2 94 %. Oxygen Delivery -    General: Acute on chronic ill appearing. Appears disheveled. In no acute distress. HEENT:  normocephalic and atraumatic. No scleral icterus. PERR  Neck: supple. No Thyromegaly. Lungs: clear to auscultation. No retractions  Cardiac: RRR. No JVD. Abdomen: soft. Nontender. Extremities: Open ulceration on the dorsum of the left foot. Right foot from metatarsals to toes are covered in Ace wraps soaked in Betadine. See below for appearance of feet prior to bandaging/wound care. Vasculature: delayed capillary refill. Skin: cool and dry in the LE b/l  Psych:  Alert and oriented x3. Lymph:  No supraclavicular adenopathy. Neurologic:  No focal deficit.  No seizures                        Diagnostic Data: (All radiographs, EKGs, PFTs, and imaging are personally viewed and interpreted unless otherwise noted). CBC:   Recent Labs     01/18/22  0630 01/19/22  0730 01/20/22  0632   WBC 3.0* 2.3* 2.0*   HGB 9.4* 9.0* 9.4*    151 158     BMP:    Recent Labs     01/18/22  0630 01/18/22  0630 01/19/22  0730 01/20/22  0632 01/20/22  1114   *  --  131* 132*  --    K 3.3*   < > 3.1* 2.9* 3.2*     --  103 101  --    CO2 21*  --  21* 20*  --    BUN 4*  --  4* 4*  --    CREATININE 0.5  --  0.6 0.5  --    GLUCOSE 107  --  101 94  --     < > = values in this interval not displayed. Ionized Calcium: No results for input(s): IONCA in the last 72 hours. Magnesium:   Recent Labs     01/20/22  1114   MG 1.4*     Phosphorus:   No results for input(s): PHOS in the last 72 hours. Hepatic:   No results for input(s): AST, ALT, ALB, BILITOT, ALKPHOS in the last 72 hours. Vitamin B12: No components found for: B12  Folate:   Lab Results   Component Value Date    FOLATE 9.8 01/10/2022     IRON:   Lab Results   Component Value Date    IRON 21 01/17/2022     Iron Saturation: No components found for: PERCENTFE  TIBC:   Lab Results   Component Value Date    TIBC 117 01/17/2022     Ferritin:   Lab Results   Component Value Date    FERRITIN 806 01/17/2022     Troponin:   Lab Results   Component Value Date    TROPONINT < 0.010 07/06/2021    TROPONINT < 0.010 09/01/2014     BNP: No results found for: BNP  Lipids: No results found for: LDL  INR:   Lab Results   Component Value Date    INR 1.56 01/12/2022     ABG: No results found for: PH, PCO2, PO2, HCO3, O2SAT  TSH:   Lab Results   Component Value Date    TSH 1.650 06/27/2017     Sed Rate: No results found for: SEDRATE  CRP:   Lab Results   Component Value Date    CRP 12.22 01/10/2022     UA:   No results for input(s): Luwana Pitt, COLORU, CLARITYU, MUCUS, PROTEINU, BLOODU, RBCUA, [address], BACTERIA, NITRU, GLUCOSEU, BILIRUBINUR, UROBILINOGEN, KETUA, LABCAST, LABCASTTY, AMORPHOS in the last 72 hours.     Invalid input(s): CRYSTALS  Micro:   Lab Results Component Value Date    BC No growth-preliminary No growth  01/10/2022     Imaging Quick Reads from Previous 7 Days:  XR FOOT LEFT (MIN 3 VIEWS)    Result Date: 1/10/2022  PROCEDURE: XR FOOT LEFT (MIN 3 VIEWS) CLINICAL INFORMATION: frostbite. COMPARISON: Left foot radiographs dated 30 January 2020. TECHNIQUE: 4 views of the left foot. FINDINGS:  There is abnormal density involving the first metatarsophalangeal joint, unchanged since remote radiographs dated 30 January 2020. There is degenerative change. There is no fracture. There is no definite bony destruction noted. There is no radiographic evidence for osteomyelitis. The calcaneus is normal. The soft tissues are normal.      1. Stable left foot radiographs, no interval change since previous study dated 30 January 2020. 2. Abnormal density involving the first metatarsophalangeal joint, unchanged. 3. Degenerative change. 4. No definite evidence of fracture, bony destruction or osteomyelitis. . **This report has been created using voice recognition software. It may contain minor errors which are inherent in voice recognition technology. ** Final report electronically signed by DR Andrea Tavares on 1/10/2022 2:00 PM    XR FOOT RIGHT (MIN 3 VIEWS)    Result Date: 1/10/2022  PROCEDURE: XR FOOT RIGHT (MIN 3 VIEWS) CLINICAL INFORMATION: frostbite. COMPARISON: No prior study. TECHNIQUE: 4 views of the right foot. FINDINGS:  There are postoperative changes with an intramedullary jaleesa and screw in the distal tibia. There is abnormal density involving the neck of the fifth metacarpal. There are degenerative changes. There is diffuse osteopenia. There is soft tissue swelling over the dorsum of the foot. There are small calcifications adjacent to the calcaneocuboid articulation. The remaining bony structures are intact. . The soft tissues are normal.      1. Postoperative changes in the distal tibia.  2. Abnormal density involving the neck of the fifth metacarpal. 3. Degenerative changes. 4. Diffuse osteopenia. 5. Soft tissue swelling over the dorsum of foot. . **This report has been created using voice recognition software. It may contain minor errors which are inherent in voice recognition technology. ** Final report electronically signed by DR Evie Ferreira on 1/10/2022 2:03 PM    IR ANGIOGRAM EXTREMITY RIGHT    Result Date: 1/17/2022  PROCEDURE: AORTOFEMORAL ANGIOGRAM/with runoff procedure/angioplasty: CLINICAL INFORMATION: 79-year-old male with right lower extremity pain and balloons. Abnormal findings and previous right lower extremity vascular ultrasound. PERFORMED BY:  Dr. Isabel Christianson. Jaclyn Grimm MD APPROACH: Left common femoral artery, micropuncture technique. CATHETERS: 5 Western Silva VCF, 5 Egyptian angled glide catheter, 4 mm x 25 cm inpact admiral balloon STENTS: None. VESSELS CATHETERIZED: Upper abdominal aorta, lower abdominal aorta, right and left external iliac arteries. Lisandro Pesa DIAGNOSTIC PROCEDURES: Abdominal aortogram, pelvic angiography, right leg arteriogram. INTERVENTIONS: Recanalization of the right superficial femoral artery. Angioplasty of multiple segments stenosis throughout the right superficial femoral and popliteal arteries. FLUOROSCOPY TIME: 19 minutes 7 seconds. FLUOROSCOPIC IMAGES: 109 SEDATION: Versed 2mg ; fentanyl 200mcg , IV; the patient was sedated for 90 minutes during this procedure and monitored with EKG and pulse ox monitoring devices by registered nurse. Face-to-face time with the patient was 90 minutes. OTHER MEDICATIONS: Heparin, 4000 units, IV. Patient was started on Plavix regimen, 75 mg, by mouth, daily, X. 45 days, starting with a 300 mg loading dose today. CONTRAST: 195 ml, Isovue-300. CLOSURE: Angio-Seal device, successful without complication. TECHNIQUE: Signed informed consent was obtained prior to performing this procedure. The patient was sedated, as indicated above. Access was obtained and a 5 Western Silva vascular sheath was inserted.  The procedures as above were then performed. FINDINGS: AORTA: The aorta is unremarkable. The renal arteries are widely patent. PELVIC VESSELS: Both common, internal, and external iliac arteries are widely patent. Angiogram right leg: CFA AND PROFUNDA: The common and deep femoral arteries are widely patent. SFA AND POPLITEAL ARTERY: There is complete occlusion of the mid right SFA. There are extensive collateral vessels which reconstitute the distal SFA/popliteal artery. There are innumerable additional areas of multifocal stenosis throughout the superficial femoral artery as well as the popliteal artery. TRIFURCATION VESSELS: All 3 trifurcation vessels are widely patent. At this point it was then decided to perform an intervention. A exchange length 0.035 inch stiff Glidewire was advanced over the aortic bifurcation into the superficial femoral artery. The existing 5 Western Silva vascular sheath was removed and a long 6 Western Silva  ANL sheath was advanced. The wire in conjunction with a glide catheter was successfully advanced through the stenotic portions of the right SFA and popliteal artery. Angioplasty was subsequently performed throughout the right leg. Post angioplasty images demonstrate significantly improved flow through the right lower extremity. The patient tolerated the procedure well with no immediate postprocedural complication. An Angio-Seal device was used for hemostasis at the left groin with no complication. Complete occlusion of the mid right SFA as well as multifocal stenosis throughout the SFA and popliteal artery, successfully treated with angioplasty with an excellent result. **This report has been created using voice recognition software. It may contain minor errors which are inherent in voice recognition technology. ** Final report electronically signed by Dr Escobar Aldana on 1/17/2022 6:36 PM    CTA ABDOMINAL AORTA W BILAT RUNOFF W WO CONTRAST    Result Date: 1/10/2022  PROCEDURE: CTA ABDOMINAL AORTA W BILAT RUNOFF W WO CONTRAST CLINICAL INFORMATION: LE wound, eschar, low pulsus, PAD . COMPARISON: No prior study. TECHNIQUE: A noncontrast localizer was obtained. 3 mm axial images were obtained through the abdomen, pelvis and both lower extremities after the administration of intravenous contrast.  3D reconstructions were performed on a dedicated 3-D workstation to  include sagittal and coronal mid images through the vasculature. Centerline reconstructions were also obtained. All CT scans at this facility use dose modulation, iterative reconstruction, and/or weight-based dosing when appropriate to reduce radiation dose to as low as reasonably achievable. FINDINGS: CTA abdominal aorta and runoff : There is no abdominal aortic aneurysm. There is no dissection. The aorta is of normal caliber. The visualized aspects of the lung bases are clear. There is mild diffuse fatty replacement of the liver. . There are punctate calcifications in the spleen. There are small bilateral adrenal nodules. There is an atrophic pancreas disc dilatation of the pancreatic duct. There is a 2.2 cm fluid collection in  the region of the splenic hilum. The gallbladder is normal.   There is no hydronephrosis or stones of either kidney. No contour deforming renal masses are noted. . No abnormalities of the small bowel loops are noted. The IVC and aorta are of normal caliber. There is no adenopathy. The bladder is normal. The prostate gland measures 4.2 x 3.6 cm in size There is no colonic inflammation. There is relatively normal flow in the abdominal aorta to right and single left renal arteries and superior mesenteric and celiac axis arteries, right and left common, internal and external iliac arteries right and left common and proximal superficial femoral arteries. There appears be an area of significant narrowing in the distal right superficial femoral artery. Evaluation of the right leg is limited by motion artifact.  There is flow in the left common and superficial femoral, popliteal, anterior and posterior tibial arteries. There is an intramedullary jaleesa in the right tibia. 1. Limited evaluation of the right leg secondary to patient motion artifact. 2. Relatively normal flow in the abdominal aorta, visceral arteries, right and left common, internal and external iliac arteries common femoral arteries. 3. There is an area of significant narrowing in the distal right superficial femoral artery. 4. There is relatively normal flow in the left common and superficial femoral, popliteal, anterior and posterior tibial arteries. 5. Intramedullary jaleesa in the right tibia-fibula. 6. Mild diffuse fatty replacement in the liver. 7. Punctate calcifications in spleen. 8. Small bilateral adrenal nodules. 9. Atrophic pancreas. Dilated pancreatic duct. 10. 2.2 cm fluid collection in the region of the splenic hilum. 11. Enlarged prostate gland. **This report has been created using voice recognition software. It may contain minor errors which are inherent in voice recognition technology. ** Final report electronically signed by DR Olga Gann on 1/10/2022 9:10 PM    US LIVER    Result Date: 1/13/2022  PROCEDURE: US LIVER, US DUP ABD PEL RETRO SCROT COMPLETE CLINICAL INFORMATION: Alcoholic cirrhosis. COMPARISON: CTA of the abdominal aorta dated 10 January 2022. TECHNIQUE: Multiplanar sonographic images were obtained of the liver. FINDINGS:  Liver - L= 20.7 cm Gallbladder - 9.4 x 2.8 x 2.7 cm Gallbladder Wall - 1.2 cm Common Duct - 0.4 cm Lopez's Sign: neg There is increased echogenicity in the liver. There are no liver masses. There is no intrahepatic biliary ductal dilatation. There is ascites adjacent to the left lobe of the liver. There is normal color flow and spectral analysis in the main portal vein, right and left portal veins, hepatic artery, inferior vena cava and all 3 hepatic veins. The pancreas was not adequately visualized. . There are small gallstones.  There is a edema. PLAFOND/TALAR DOME/ANKLE MORTISE: 1. Only evaluated on the sagittal view. 2. There is no osteochondral fracture. 3. The ankle mortise in intact. 4. Mild degenerative changes of the ankle joint. JOINTS: There is hallux valgus deformity of the first digit. Severe degenerative changes at the first metatarsophalangeal joint. Mild degenerative changes seen in the remainder of the left foot. TENDONS: Limited evaluation. Grossly intact. SINUS TARSI/TARSAL TUNNEL: Unremarkable. MUSCULATURE: No significant atrophy JOINT FLUID: 1. Physiologic. SOFT TISSUES: Susceptibility artifact seen in the region of the proximal phalanx of the first digit likely relates to the presence of a soft tissue gas. The soft tissue ulcer appears to approximate the proximal phalanx of the first digit. Mild thickening  of the proximal plantar fascia. 1. The soft tissue ulcer appears to approximate the proximal phalanx of the first digit. Soft tissue gas is noted. Osteomyelitis cannot be excluded. 2. Increased bone marrow signal on STIR without corresponding T1 hypointensity in the first metatarsal is a nonspecific findings. Likely relates to reactive edema at this time. The finding is equivocal for osteomyelitis. 3. Linear hyperintensity and STIR in the base of the proximal phalanx of the fifth digit without corresponding T1 hypointensity likely relates to reactive bone marrow edema versus a stress reaction. Clinical correlation is recommended. 4. Marked degenerative changes of the first metatarsophalangeal joint. Hallux valgus deformity. **This report has been created using voice recognition software. It may contain minor errors which are inherent in voice recognition technology. ** Final report electronically signed by Dr Tj Loco on 1/13/2022 11:52 AM    VL DUP LOWER EXTREMITY ARTERIES BILATERAL    Result Date: 1/14/2022  PROCEDURE: VL DUP LOWER EXTREMITY ARTERIES BILATERAL CLINICAL INFORMATION: Assess blood flow for healing potential; possible surgical intervention in the near future (right foot TMA), Patient has frostbite to right distal forefoot; patient has full-thickness ulceration to dorsal left forefoot COMPARISON: No comparison study available. TECHNIQUE: Multiple permanent grayscale and color flow sonographic images of the major arteries of both lower extremities were obtained from the level of the groin to the level of the ankle . Spectral Doppler waveforms were obtained and velocity measurements were measured. FINDINGS: RIGHT ARTERY (PSV cm/sec) CFA ---------------> 127 PSV cm/sec PROF -------------> 244 PSV cm/sec SFA PROX -------> 53 PSV cm/sec SFA MID ----------> 0 PSV cm/sec SFA DIST --------> 24 PSV cm/sec POP A PROX ---> 63 PSV cm/sec POP A DIST ----> 56 PSV cm/sec PTA ---------------> 57 PSV cm/sec PERONEAL -----> 17 PSV cm/sec KAREN ----------------> 30 PSV cm/sec LEFT ARTERY (PSV cm/sec) CFA ---------------> 172 PSV cm/sec PROF -------------> 84 PSV cm/sec SFA PROX -------> 84 PSV cm/sec SFA MID ----------> 124 PSV cm/sec SFA DIST --------> 95 PSV cm/sec POP A PROX ---> 121 PSV cm/sec POP A DIST ----> 152 PSV cm/sec PTA ---------------> 68 PSV cm/sec PERONEAL -----> 41 PSV cm/sec KAREN ----------------> 77 PSV cm/sec ROXANE RIGHT KAREN----->0.53 PTA----->0.59 TBI------>0 ROXANE LEFT KAREN----->0.92 PTA----->0.95 TBI------>0.72 VELOCITY MEASUREMENTS: Moderately diminished ABIs right side. Normal ABIs left side. RIGHT LEG: Excellent pulsatility in the common femoral artery. Significant step-off in velocity in the profunda, suggesting a mild stenosis. Moderately dampened pulsatility is seen in the proximal SFA with occlusion of the mid SFA and collateral reconstitution of the distal SFA with severely dampened monophasic flow. Relatively good flow is seen in the popliteal artery, however, as well as in the posterior tibial artery.  There is moderately dampened pulsatility in the anterior tibial artery with  severely dampened pulsatility in the peroneal artery. LEFT LEG: Excellent pulsatility in the common femoral artery. Good pulsatility in the profunda. Good pulsatility is seen in the SFA diffusely as well as in the popliteal artery and all 3 trifurcation vessels. 1. Normal findings in the left leg. 2. Abnormal findings in the right leg,. Findings suggestive of mild stenosis at the origin of the profunda and possibly at the origin right SFA. Total occlusion mid right SFA with distal collateral reconstitution. Also evidence for trifurcation disease right side. 3. Aortofemoral angiography is recommended for further evaluation with possible intervention. If desired, this can be arranged through the interventional scheduling desk of [facility]'s radiology department ([phone]). **This report has been created using voice recognition software. It may contain minor errors which are inherent in voice recognition technology. ** Final report electronically signed by Dr. Christa Cordova on 1/14/2022 11:14 AM    US DUP ABD PEL RETRO SCROT COMPLETE    Result Date: 1/13/2022  PROCEDURE: US LIVER, US DUP ABD PEL RETRO SCROT COMPLETE CLINICAL INFORMATION: Alcoholic cirrhosis. COMPARISON: CTA of the abdominal aorta dated 10 January 2022. TECHNIQUE: Multiplanar sonographic images were obtained of the liver. FINDINGS:  Liver - L= 20.7 cm Gallbladder - 9.4 x 2.8 x 2.7 cm Gallbladder Wall - 1.2 cm Common Duct - 0.4 cm Lopez's Sign: neg There is increased echogenicity in the liver. There are no liver masses. There is no intrahepatic biliary ductal dilatation. There is ascites adjacent to the left lobe of the liver. There is normal color flow and spectral analysis in the main portal vein, right and left portal veins, hepatic artery, inferior vena cava and all 3 hepatic veins. The pancreas was not adequately visualized. . There are small gallstones. There is a thickened gallbladder wall and pericholecystic fluid. . .  The common bile duct is normal and measures 4 mm. There is fluid in the hepatorenal fossa. There is an area of abnormal echogenicity anterior to the inferior vena cava posterior to the liver. These findings may represent abnormal hepatic flexure and ascending colon possibly secondary to colitis. Please correlate clinically. 1. Increased echogenicity in the liver consistent with fatty infiltration. 2. There are gallstones, gallbladder wall thickening and pericholecystic fluid. 3. There is ascites adjacent to the left lobe of liver and fluid in the hepatorenal fossa. 4. Small area of abnormal echogenicity anterior to the inferior vena cava posterior to the liver. In correlation with previous CT angiogram, these findings may represent an abnormal hepatic flexure and ascending colon possibly related to colitis. Please correlate clinically. . **This report has been created using voice recognition software. It may contain minor errors which are inherent in voice recognition technology. ** Final report electronically signed by DR Kylah Vernon on 1/13/2022 1:29 PM    Felipe Dobson DPM

## 2022-01-20 NOTE — PROGRESS NOTES
4105: Shift assessment complete. VSS. Alert and oriented x4. See flowsheets. New dressings applied to bilateral feet/toes. Morning medications givenp er MAR, prn norco given for back pain. The care plan and education has been reviewed and mutually agreed upon with the patient. Pt needs expressed, call light within reach.

## 2022-01-20 NOTE — CARE COORDINATION
DISCHARGE ON GOING EVALUATION    [address] [place] [address] day: 10  Location: [facility]/027-A Reason for admit: Wound infection [T14. 8XXA, L08.9]  Foot pain, bilateral [id number], M79.672]  Frostbite of both feet, initial encounter [X23.311R, T33.822A]   Procedure:   1/17 arteriogram by IR  Barriers to Discharge: Dressing care/changes, up ambulating. IVF, Nitro ointment to right foot daily,  K+ 3.2 with IV replacement. Added po Augmentin x 7 days. Plavix. Patient Goals/Plan/Treatment Preferences: plans SNF with SW following.

## 2022-01-20 NOTE — PROGRESS NOTES
VS: BP (!) 144/69   Pulse 88   Temp 97.6 °F (36.4 °C) (Oral)   Resp 18   Ht 6' (1.829 m)   Wt 140 lb (63.5 kg)   SpO2 95%   BMI 18.99 kg/m²     Writer performed CIWA scale per orders at this time d/t patient presenting with increased anxiety/agitation, visible tremors even at rest and patient c/o nausea with 2 episodes of loose/watery stool this shift. Ativan administered per CIWA scale protocol. Patient scored 9 at this time (see flowsheet). Will continue to monitor.      Fe Dela Cruz RN

## 2022-01-20 NOTE — ADT AUTH CERT
Wound Photos by Alicia Gallardo RN       Review Status Review Entered   In Primary 1/20/2022 16:00      Criteria Review                   Wound and Skin Management [facility] - Care Day 11 (1/20/2022) by Alicia Gallardo RN       Review Status Review Entered   Completed 1/20/2022 15:57      Criteria Review      Care Day: 11 Care Date: 1/20/2022 Level of Care: Inpatient Floor    Guideline Day 3    Level Of Care    ( ) * Activity level acceptable    ( ) * Complete discharge planning    Clinical Status    ( ) * Temperature status acceptable    ( ) * No infection, or status acceptable    ( ) * Pain and nausea absent or adequately managed    ( ) * Wound and ulcer problems absent or status acceptable    ( ) * No burn injury, or status acceptable    ( ) * Traumatic injury absent or status appropriate    ( ) * Skin disease absent or status appropriate    ( ) * General Discharge Criteria met    Interventions    ( ) * Intake acceptable    ( ) * No inpatient interventions needed    * Milestone   Additional Notes   1/20 IM      Blood pressure (!) 142/76, pulse 72, temperature 98.2 °F (36.8 °C), temperature source Oral, resp. rate 18, height 6' (1.829 m), weight 140 lb (63.5 kg), SpO2 94 %.           Recent Labs     01/18/22   0630 01/19/22   0730 01/20/22   0632   WBC 3.0* 2.3* 2.0*   HGB 9.4* 9.0* 9.4*    151 158       BMP:     Recent Labs     01/18/22   0630 01/18/22   0630 01/19/22   0730 01/20/22   0632 01/20/22   1114   * -- 131* 132* --    K 3.3*  < > 3.1* 2.9* 3.2*    -- 103 101 --    CO2 21* -- 21* 20* --    BUN 4* -- 4* 4* --    Recent Labs     01/20/22   1114   MG 1.4*                1. Bilateral LE Ulcers/Eschar/?Frostbite on RLE: Patient presents with what appears to be frostbite of the right foot as well as a full-thickness ulceration of the left foot.  Patient is afebrile however leukocytosis was present on arrival.  All digits of the right foot appear necrotic, foul-smelling and gangrenous which extends to the metatarsal level.  The left foot has an open ulcer on the dorsal aspect near the second digit however does not appear to be necrotic appearing as the right foot.  CTA of the abdominal aorta with bilateral runoff shows an area of significant narrowing in the distal right superficial femoral artery with what appears to be relatively normal flow in the left LE circulation.  Podiatry consulted and performing daily wound care/Betadine debridement.  MRI 1/13/2022 reveals what appears to be osteomyelitis in the first digit and metatarsal and soft tissue gas as noted by radiologist. Kenny Gabriel underwent RLE arteriogram with angioplasty on 1/17/2022 by IR. ? As per podiatry, plan for eventual TMA as outpatient.  No indication for immediate amputation at this time. ? Continue Augmentin.  No further indication for pseudomonal coverage given length of therapy with Zosyn.  Augmentin will continue for another 7 days for full course of treatment for deep skin infections. 2. 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 Sarah Gold studies done suggest inflammatory anemia.  Benign reticulocyte count, however patient has been having a drop in his chronic macrocytic hemoglobin compared to his usual baseline.  We will continue to monitor/trend, unclear if patient will follow up with specialist as outpatient but will make suggestion if need be.   3. History of EtOH Abuse: Patient no longer agitated as seen in alcohol abuse.  Sitter at bedside. 4. Diffuse Alcoholic Fatty Liver Disease without Cirrhosis: Evidence for acute EtOH toxic insult due to mild transaminitis with AST > ALT. Both now downtrending, indicating recovery from EtOH toxic insult.   INR 1.56.  Platelet count greater than 150,000.  Ultrasound of the liver shows fatty infiltration with concurrent hypoechogenicity, normal color flow via Doppler studies.  No CBD dilatation.  Patient at risk for developing fibrotic changes with continued drinking and eventual cirrhosis. 5. Severe Hypoalbuminemia: Due to severe malnutrition and chronic EtOH use. IV albumin x3 doses given on 1/12-1/13/2022    Dietitian following   6. Moderate Acute on Chronic Asymptomatic Hypovolemic Hyponatremia: Plateaued now, stable nearing baseline. Current still Na 131. Urine Na > 20, Urine osm > 100. Continue with IV NS.   7. Mild Rhabdomyolysis: Resolved. 8. Disposition: Patient ok for discharge per podiatry. However patient requested assisted living, which won't be approved due to insurance, and wishes to now go to a nursing home. SW currently investigating options    ================   CM      Barriers to Discharge: Dressing care/changes, up ambulating.  IVF, Nitro ointment to right foot daily,  K+ 3.2 with IV replacement. Added po Augmentin x 7 days. Plavix.    Patient Goals/Plan/Treatment Preferences: plans SNF with SW following      ==========   Meds   0.9 % sodium chloride infusion   Route: IntraVENous Frequency: CONTINUOUS @ 75 mL/hr   01/10/22 2100    enoxaparin (LOVENOX) injection 40 mg  40 mg,   SubCUTAneous,   EVERY 24 HOURS        01/10/22 1739 --   01/10/22 2030    thiamine tablet 100 mg  100 mg,   Oral,   DAILY        01/10/22 1739 --   01/10/22 2030    multivitamin 1 tablet  1 tablet,   Oral,   DAILY        01/19/22 2100    amoxicillin-clavulanate (AUGMENTIN) 875-125 MG per tablet 1 tablet  1 tablet,   Oral,   2 times per day        01/19/22 1741 01/26/22 2059 01/18/22 0900    clopidogrel (PLAVIX) tablet 75 mg  75 mg,   Oral,   DAILY        01/17/22 1513 --   01/16/22 0900    nitroglycerin (NITRO-BID) 2 % ointment 0.5 inch  0.5 inch,   Topical,   DAILY

## 2022-01-21 LAB
ANION GAP SERPL CALCULATED.3IONS-SCNC: 9 MEQ/L (ref 8–16)
ATYPICAL LYMPHOCYTES: ABNORMAL %
BASOPHILS # BLD: 1 %
BASOPHILS ABSOLUTE: 0 THOU/MM3 (ref 0–0.1)
BUN BLDV-MCNC: 5 MG/DL (ref 7–22)
CALCIUM SERPL-MCNC: 8.3 MG/DL (ref 8.5–10.5)
CHLORIDE BLD-SCNC: 105 MEQ/L (ref 98–111)
CO2: 21 MEQ/L (ref 23–33)
CREAT SERPL-MCNC: 0.5 MG/DL (ref 0.4–1.2)
EOSINOPHIL # BLD: 3 %
EOSINOPHILS ABSOLUTE: 0.1 THOU/MM3 (ref 0–0.4)
ERYTHROCYTE [DISTWIDTH] IN BLOOD BY AUTOMATED COUNT: 14.6 % (ref 11.5–14.5)
ERYTHROCYTE [DISTWIDTH] IN BLOOD BY AUTOMATED COUNT: 54.9 FL (ref 35–45)
GFR SERPL CREATININE-BSD FRML MDRD: > 90 ML/MIN/1.73M2
GLUCOSE BLD-MCNC: 108 MG/DL (ref 70–108)
HCT VFR BLD CALC: 31 % (ref 42–52)
HEMOGLOBIN: 10.3 GM/DL (ref 14–18)
IMMATURE GRANS (ABS): 0.02 THOU/MM3 (ref 0–0.07)
IMMATURE GRANULOCYTES: 0.7 %
LYMPHOCYTES # BLD: 55.3 %
LYMPHOCYTES ABSOLUTE: 1.7 THOU/MM3 (ref 1–4.8)
MCH RBC QN AUTO: 33.7 PG (ref 26–33)
MCHC RBC AUTO-ENTMCNC: 33.2 GM/DL (ref 32.2–35.5)
MCV RBC AUTO: 101.3 FL (ref 80–94)
MONOCYTES # BLD: 14 %
MONOCYTES ABSOLUTE: 0.4 THOU/MM3 (ref 0.4–1.3)
NUCLEATED RED BLOOD CELLS: 0 /100 WBC
PLATELET # BLD: 205 THOU/MM3 (ref 130–400)
PLATELET ESTIMATE: ADEQUATE
PMV BLD AUTO: 10.9 FL (ref 9.4–12.4)
POTASSIUM SERPL-SCNC: 3.5 MEQ/L (ref 3.5–5.2)
RBC # BLD: 3.06 MILL/MM3 (ref 4.7–6.1)
SCAN OF BLOOD SMEAR: NORMAL
SEG NEUTROPHILS: 26 %
SEGMENTED NEUTROPHILS ABSOLUTE COUNT: 0.8 THOU/MM3 (ref 1.8–7.7)
SODIUM BLD-SCNC: 135 MEQ/L (ref 135–145)
WBC # BLD: 3 THOU/MM3 (ref 4.8–10.8)

## 2022-01-21 PROCEDURE — 6370000000 HC RX 637 (ALT 250 FOR IP): Performed by: INTERNAL MEDICINE

## 2022-01-21 PROCEDURE — 1200000000 HC SEMI PRIVATE

## 2022-01-21 PROCEDURE — 85025 COMPLETE CBC W/AUTO DIFF WBC: CPT

## 2022-01-21 PROCEDURE — 2580000003 HC RX 258: Performed by: HOSPITALIST

## 2022-01-21 PROCEDURE — 6360000002 HC RX W HCPCS: Performed by: INTERNAL MEDICINE

## 2022-01-21 PROCEDURE — 80048 BASIC METABOLIC PNL TOTAL CA: CPT

## 2022-01-21 PROCEDURE — 36415 COLL VENOUS BLD VENIPUNCTURE: CPT

## 2022-01-21 PROCEDURE — 6370000000 HC RX 637 (ALT 250 FOR IP): Performed by: RADIOLOGY

## 2022-01-21 PROCEDURE — 6370000000 HC RX 637 (ALT 250 FOR IP): Performed by: STUDENT IN AN ORGANIZED HEALTH CARE EDUCATION/TRAINING PROGRAM

## 2022-01-21 PROCEDURE — 99231 SBSQ HOSP IP/OBS SF/LOW 25: CPT | Performed by: INTERNAL MEDICINE

## 2022-01-21 RX ADMIN — AMOXICILLIN AND CLAVULANATE POTASSIUM 1 TABLET: 875; 125 TABLET, FILM COATED ORAL at 08:25

## 2022-01-21 RX ADMIN — HYDROCODONE BITARTRATE AND ACETAMINOPHEN 1 TABLET: 5; 325 TABLET ORAL at 19:24

## 2022-01-21 RX ADMIN — LORAZEPAM 1 MG: 1 TABLET ORAL at 20:12

## 2022-01-21 RX ADMIN — ENOXAPARIN SODIUM 40 MG: 100 INJECTION SUBCUTANEOUS at 20:11

## 2022-01-21 RX ADMIN — AMOXICILLIN AND CLAVULANATE POTASSIUM 1 TABLET: 875; 125 TABLET, FILM COATED ORAL at 20:12

## 2022-01-21 RX ADMIN — SODIUM CHLORIDE: 9 INJECTION, SOLUTION INTRAVENOUS at 12:07

## 2022-01-21 RX ADMIN — CLOPIDOGREL BISULFATE 75 MG: 75 TABLET, FILM COATED ORAL at 08:24

## 2022-01-21 RX ADMIN — Medication 1 TABLET: at 08:26

## 2022-01-21 RX ADMIN — Medication 100 MG: at 08:26

## 2022-01-21 RX ADMIN — HYDROCODONE BITARTRATE AND ACETAMINOPHEN 1 TABLET: 5; 325 TABLET ORAL at 08:25

## 2022-01-21 ASSESSMENT — PAIN DESCRIPTION - LOCATION
LOCATION: BACK

## 2022-01-21 ASSESSMENT — PAIN DESCRIPTION - PAIN TYPE
TYPE: CHRONIC PAIN

## 2022-01-21 ASSESSMENT — PAIN DESCRIPTION - FREQUENCY: FREQUENCY: CONTINUOUS

## 2022-01-21 ASSESSMENT — PAIN SCALES - GENERAL
PAINLEVEL_OUTOF10: 10

## 2022-01-21 ASSESSMENT — PAIN DESCRIPTION - ORIENTATION
ORIENTATION: LOWER

## 2022-01-21 ASSESSMENT — PAIN DESCRIPTION - PROGRESSION: CLINICAL_PROGRESSION: NOT CHANGED

## 2022-01-21 ASSESSMENT — PAIN DESCRIPTION - ONSET: ONSET: ON-GOING

## 2022-01-21 ASSESSMENT — PAIN DESCRIPTION - DESCRIPTORS: DESCRIPTORS: ACHING

## 2022-01-21 NOTE — CARE COORDINATION
DISCHARGE ON GOING EVALUATION    [address] day: 11 [id number]  Procedure:na  Barriers to Discharge: discharge placement continues, podiatry follows     Readmission Risk Score: 14.9 ( )%  Patient Goals/Plan/Treatment Preferences: pt requested ECF: SW assisting    1140 am-  Cindy Harris CM from [employer] called for update: she approved more days and her phone # [phone] for any discharge needs.

## 2022-01-21 NOTE — PROGRESS NOTES
7358: Shift assessment complete. VSS. Alert and oriented x4. Dressing to bilateral toes intact with minimal drainage noted. See flowsheets. Morning medications given per MAR, prn norco given for chronic back pain. The care plan and education has been reviewed and mutually agreed upon with the patient. Pt needs expressed, call light within reach.

## 2022-01-21 NOTE — PROGRESS NOTES
Podiatric Progress Note  Len Rahman  Subjective :   1/21/2022  Patient seen at bedside today on behalf of Dr. Jacy Hudson. Patient is awake and pleasant, and is alert and oriented to person, place, and time. Patient continues to endorse 10/10 pain to his bilateral lower extremities, and states that he has intermittent intense shooting type pains to his bilateral lower extremities. Otherwise, patient states that he is feeling well overall. He denies any N/V/F/C/SOB/CP or bilateral calf tenderness. She denies any other pedal concerns at this time. Patient inquired if we have heard back from any of the facilities regarding his possible discharge. 1/20/2022  Patient seen at bedside today on behalf of Dr. Jacy Hudson. Patient is sleeping. After being awoken, patient is alert and oriented to person, place, and time. Patient denies any interval changes in his symptoms since yesterday's encounter. Patient was curious as to when he will be discharged and where he will be discharged to home. He denies any N/V/F/C/SOB/CP or bilateral calf tenderness. He has no other pedal complaints at this time. 1/19/2022  Patient seen at bedside today on behalf of Dr. Jacy Hudson. Patient is awake, and is alert and oriented to person, place, and time. Patient relates no interval change in his symptoms. Patient is curious when he will have his amputation. Patient is hopeful that he is able to be put into assisted living at discharge as they have a warm room and cable TV. Patient is concerned about his ability to be functional after an amputation. Patient endorses chronic shortness of breath that is unchanged recently, but denies any nausea, vomiting, fever, chills, chest pain, shortness of breath. No other pedal complaints. 1/8/2022  Patient seen at bedside today on behalf of Dr. Jacy Hudson. Patient is awake, and is alert and oriented to person, place, and time.   Patient continues to endorse 10/10 pain to his legs; he also endorses 10/10 pain from his lower back. Otherwise, patient denies any N/V/F/C/SOB/CP or bilateral calf tenderness. Patient is curious to know what the timeline is for his eventual amputation surgery. He has no other pedal complaints at this time. 1/17/2022  Patient seen at bedside this morning on behalf of Dr. Charlette Xiong. Patient is alert and oriented to person, place, and time. He is not in acute distress. Patient states that overall, he is feeling good. He endorses pain to his bilateral lower extremities, and rates the pain as 10/10. Prior to today, patient denied any pain in bilateral lower extremities and stated that he could not feel anything that is bilateral lower extremities were constantly numb. Patient asked if he could be allowed outside to smoke, as he states that the nicotine replacement therapy is not working well for him. Patient would like to know when his right foot will be amputated. Otherwise, he denies any N/V/F/C/SOB/CP or bilateral calf tenderness. He has no other pedal complaints at this time. 1/16/2022  Patient seen at bedside today on behalf of Dr. Charlette Xiong. Patient is alert and oriented to person, place, and time. Patient is extremely displeased that he is still in the hospital. He would like to be able to go smoke. He wants his right foot amputated, and is unsure why the amputation is being delayed. He denies any nausea, vomiting, fever, chills, chest pain, shortness of breath. No other pedal complaints. 1/15/2022  Patient seen at bedside today on behalf of Dr. Charlette Xiong. Patient alert and oriented. Patient continues to deny any pain to bilateral feet. He also denies any N/V/F/C/SOB/CP. That he is still at high risk for losing the digits of the right foot. Otherwise no new complaints. 1/14/2022  Patient seen at bedside today on behalf of Dr. Charlette Xiong. Patient alert and oriented. Patient continues to deny any pain to bilateral feet.   He also denies any N/V/F/C/SOB/CP or bilateral calf tenderness. Patient inquired as to which of his toes he may be a losing; reiterated to patient that he has frostbite involving all 5 digits on the right foot, and that he is going to lose all 5 digits of the right foot. Otherwise, patient has no other pedal complaints at this time. 1/13/2022  Patient seen at bedside today on behalf of Dr. Patrice Burns. Patient was somnolent during encounter; Per sitter, patient has been sleepy all day. Patient denies any pain to bilateral feet. He currently denies any N/V/F/C/SOB/CP or bilateral calf tenderness. He has no new pedal complaints at this time. 1/12/2022  Patient seen at bedside today alongside Dr. Patrice Burns. Patient appeared pleasant, was oriented to person, place and time and in no acute distress. Patient denies any pain to either of his feet. Patient also denies any N/V/F/C/SOB or CP. Patient has no further pedal concerns at this point in time. HPI:  The patient is a homeless 63 y.o. male with significant past medical history of substance abuse (cocaine use, alcohol) tobacco use, and incarcerated left inguinal hernia who being seen at bedside today on behalf of Dr. Patrice uBrns. Patient was admitted yesterday via [facility] ED for frostbite, foot wound, and lab abnormalities. Patient is pleasant, but very poor historian. Patient does not know how long he has had frostbite changes to his right foot nor the wound on his left foot. He thinks they must have been there for several weeks. He relates that his feet have been hurting for several weeks; however when asked by RN prior to getting pain medication, patient stated he cannot feel any pain in his lower legs as they are numb all the time and all of his pain is from his lower back. Patient admits drinking approximately 6 drinks per day daily for the last several years. He currently denies any N/V/F/C/SOB/CP or bilateral calf tenderness.   He has no other pedal complaints at this time.    Current Medications:    Current Facility-Administered Medications: guaiFENesin-dextromethorphan (ROBITUSSIN DM) 100-10 MG/5ML syrup 10 mL, 10 mL, Oral, Q4H PRN  amoxicillin-clavulanate (AUGMENTIN) 875-125 MG per tablet 1 tablet, 1 tablet, Oral, 2 times per day  ketorolac (TORADOL) injection 15 mg, 15 mg, IntraVENous, Q6H PRN  metoprolol (LOPRESSOR) injection 5 mg, 5 mg, IntraVENous, Q6H PRN  clopidogrel (PLAVIX) tablet 75 mg, 75 mg, Oral, Daily  nitroglycerin (NITRO-BID) 2 % ointment 0.5 inch, 0.5 inch, Topical, Daily  melatonin tablet 6 mg, 6 mg, Oral, Nightly PRN  hydrOXYzine (ATARAX) tablet 25 mg, 25 mg, Oral, Nightly PRN  nicotine (NICODERM CQ) 14 MG/24HR 1 patch, 1 patch, TransDERmal, Daily  haloperidol lactate (HALDOL) injection 1 mg, 1 mg, IntraMUSCular, Q6H PRN  0.9 % sodium chloride infusion, , IntraVENous, Continuous  HYDROcodone-acetaminophen (NORCO) 5-325 MG per tablet 1 tablet, 1 tablet, Oral, Q6H PRN  phosphorus replacement protocol, , Other, RX Placeholder  calcium replacement protocol, , Other, RX Placeholder  povidone-iodine (BETADINE) 10 % external solution, , Topical, PRN  sodium chloride flush 0.9 % injection 5-40 mL, 5-40 mL, IntraVENous, 2 times per day  sodium chloride flush 0.9 % injection 5-40 mL, 5-40 mL, IntraVENous, PRN  0.9 % sodium chloride infusion, 25 mL, IntraVENous, PRN  enoxaparin (LOVENOX) injection 40 mg, 40 mg, SubCUTAneous, Q24H  ondansetron (ZOFRAN-ODT) disintegrating tablet 4 mg, 4 mg, Oral, Q8H PRN **OR** ondansetron (ZOFRAN) injection 4 mg, 4 mg, IntraVENous, Q6H PRN  polyethylene glycol (GLYCOLAX) packet 17 g, 17 g, Oral, Daily PRN  acetaminophen (TYLENOL) tablet 650 mg, 650 mg, Oral, Q6H PRN **OR** acetaminophen (TYLENOL) suppository 650 mg, 650 mg, Rectal, Q6H PRN  thiamine tablet 100 mg, 100 mg, Oral, Daily  multivitamin 1 tablet, 1 tablet, Oral, Daily  potassium chloride (KLOR-CON M) extended release tablet 40 mEq, 40 mEq, Oral, PRN **OR** potassium bicarb-citric acid (EFFER-K) effervescent tablet 40 mEq, 40 mEq, Oral, PRN **OR** potassium chloride 10 mEq/100 mL IVPB (Peripheral Line), 10 mEq, IntraVENous, PRN  magnesium sulfate 2000 mg in 50 mL IVPB premix, 2,000 mg, IntraVENous, PRN  LORazepam (ATIVAN) tablet 1 mg, 1 mg, Oral, Q1H PRN **OR** LORazepam (ATIVAN) injection 1 mg, 1 mg, IntraVENous, Q1H PRN **OR** LORazepam (ATIVAN) tablet 2 mg, 2 mg, Oral, Q1H PRN **OR** LORazepam (ATIVAN) injection 2 mg, 2 mg, IntraVENous, Q1H PRN **OR** LORazepam (ATIVAN) tablet 3 mg, 3 mg, Oral, Q1H PRN **OR** LORazepam (ATIVAN) injection 3 mg, 3 mg, IntraVENous, Q1H PRN **OR** LORazepam (ATIVAN) tablet 4 mg, 4 mg, Oral, Q1H PRN **OR** LORazepam (ATIVAN) injection 4 mg, 4 mg, IntraVENous, Q1H PRN    Objective     BP (!) 152/72   Pulse 77   Temp 97.6 °F (36.4 °C) (Oral)   Resp 16   Ht 6' (1.829 m)   Wt 140 lb (63.5 kg)   SpO2 97%   BMI 18.99 kg/m²      I/O:    Intake/Output Summary (Last 24 hours) at 1/21/2022 1751  Last data filed at 1/21/2022 1400  Gross per 24 hour   Intake 1913.36 ml   Output 1000 ml   Net 913.36 ml              Wt Readings from Last 3 Encounters:   01/10/22 140 lb (63.5 kg)   07/06/21 150 lb (68 kg)   05/27/21 160 lb (72.6 kg)       LABS:    Recent Labs     01/20/22  0632 01/21/22  0651   WBC 2.0* 3.0*   HGB 9.4* 10.3*   HCT 27.4* 31.0*    205        Recent Labs     01/21/22  0651      K 3.5      CO2 21*   BUN 5*   CREATININE 0.5        No results for input(s): PROT, INR, APTT in the last 72 hours. No results for input(s): CKTOTAL, CKMB, CKMBINDEX, TROPONINI in the last 72 hours. Focused Lower Extremity Examination:    Vitals:    BP (!) 152/72   Pulse 77   Temp 97.6 °F (36.4 °C) (Oral)   Resp 16   Ht 6' (1.829 m)   Wt 140 lb (63.5 kg)   SpO2 97%   BMI 18.99 kg/m²      Dermatologic: Dressings to BLE intact. There is a full-thickness ulceration noted to the dorsal aspect of left foot, between metatarsal necks 1 and 2. Wound bed with granular tissue with small areas of infarcted tissue. There is very scant serous drainage noted. No purulence or malodor. Wound appears stable at this time. Superficial thickness ulceration noted to distal aspect of left second digit; left second digit is mildly erythematous and edematous; erythema and edema consistent with previous. No drainage or malodor noted to superficial ulcer on the distal left second digit. On the right, there are extensive ischemic and necrotic changes to all 5 digits extending proximally to the distal metatarsal level. There is a deroofed blister noted to the dorsum of the right distal forefoot. There are superficial thickness ulcerations on the dorsal medial aspect of the right hallux and the distal tip of the left fifth digit. Digits 1 through 5 of the distal right forefoot are beginning to harden and turn very dark in coloration. There is increasing duskiness to the rest of the digits extending toward the metatarsophalangeal joints on the right foot. There is increasing necrosis noted to the distal lateral aspect of the patient's right foot. There is increasing demarcation between viable and nonviable tissue as can be seen in the photographs. There is slight malodor noted to the right forefoot. Erythema present on the proximal aspect of frostbitten tissue of right foot.     Vascular: Dorsalis pedis and posterior tibial pulses are palpable bilaterally; DP and PT pulses are diminished to the RLE. Skin temperature is warm to warm from proximal tibial tuberosity to distal digits of left foot; skin temperature is warm to  cool from proximal tibial tuberosity to distal forefoot and digits of right foot. Right foot is appreciably warmer after revascularization procedure. CFT within normal limits to all 5 digits of left foot; CFT absent to distal tips of all 5 digits of right foot. Edema is present to RLE.  Pedal hair is absent bilaterally.     Neurovascular: Light touch sensation grossly diminished to the midfoot bilaterally.      Musculoskeletal: Muscle strength 4/5 and symmetric for all muscle groups crossing the ankle joint bilaterally. Decreased ROM noted at the ankle and ST joints bilaterally. No pain with palpation of any foot or ankle structures. Foot and ankle grossly rectus alignment bilaterally. STUDIES:  XR, right foot  Impression       1. Postoperative changes in the distal tibia. 2. Abnormal density involving the neck of the fifth metacarpal.   3. Degenerative changes. 4. Diffuse osteopenia. 5. Soft tissue swelling over the dorsum of foot. .                   **This report has been created using voice recognition software. It may contain minor errors which are inherent in voice recognition technology. **       Final report electronically signed by DR Arnold Blanco on 1/10/2022 2:03 PM      XR, left foot  Impression       1. Stable left foot radiographs, no interval change since previous study dated 30 January 2020.   2. Abnormal density involving the first metatarsophalangeal joint, unchanged. 3. Degenerative change. 4. No definite evidence of fracture, bony destruction or osteomyelitis. .                   **This report has been created using voice recognition software. It may contain minor errors which are inherent in voice recognition technology. **       Final report electronically signed by DR Arnold Blanco on 1/10/2022 2:00 PM      CTA, abdominal aorta with bilateral runoff  Impression       1. Limited evaluation of the right leg secondary to patient motion artifact. 2. Relatively normal flow in the abdominal aorta, visceral arteries, right and left common, internal and external iliac arteries common femoral arteries. 3. There is an area of significant narrowing in the distal right superficial femoral artery.    4. There is relatively normal flow in the left common and superficial femoral, popliteal, anterior and posterior tibial arteries. 5. Intramedullary jaleesa in the right tibia-fibula. 6. Mild diffuse fatty replacement in the liver. 7. Punctate calcifications in spleen. 8. Small bilateral adrenal nodules. 9. Atrophic pancreas. Dilated pancreatic duct. 10. 2.2 cm fluid collection in the region of the splenic hilum. 11. Enlarged prostate gland.           **This report has been created using voice recognition software. It may contain minor errors which are inherent in voice recognition technology. **       Final report electronically signed by DR Sal Espitia on 1/10/2022 9:10 PM     MRI, left foot  Impression       1. The soft tissue ulcer appears to approximate the proximal phalanx of the first digit. Soft tissue gas is noted. Osteomyelitis cannot be excluded.       2. Increased bone marrow signal on STIR without corresponding T1 hypointensity in the first metatarsal is a nonspecific findings. Likely relates to reactive edema at this time. The finding is equivocal for osteomyelitis.       3. Linear hyperintensity and STIR in the base of the proximal phalanx of the fifth digit without corresponding T1 hypointensity likely relates to reactive bone marrow edema versus a stress reaction. Clinical correlation is recommended.       4. Marked degenerative changes of the first metatarsophalangeal joint. Hallux valgus deformity.               **This report has been created using voice recognition software.  It may contain minor errors which are inherent in voice recognition technology. **       Final report electronically signed by Dr Aram Adler on 1/13/2022 11:52 AM     Lower extremity arterial Doppler, bilateral  Impression   1. Normal findings in the left leg. 2. Abnormal findings in the right leg,. Findings suggestive of mild stenosis at the origin of the profunda and possibly at the origin right SFA. Total occlusion mid right SFA with distal collateral reconstitution. Also evidence for trifurcation disease    right side.    3. Aortofemoral angiography is recommended for further evaluation with possible intervention. If desired, this can be arranged through the interventional scheduling desk of [facility] department ([phone]).             **This report has been created using voice recognition software.  It may contain minor errors which are inherent in voice recognition technology. **       Final report electronically signed by Dr. Radha Maurice on 1/14/2022 11:14 AM       ASSESSMENT: Pt. is a 63 y.o. male with:  Principle  Frostbite, right foot  Full-thickness ulceration, left foot    Chronic  Patient Active Problem List   Diagnosis    Incarcerated left inguinal hernia    Tobacco dependence    Substance induced mood disorder ([address]    Foot pain, bilateral    Wound infection    Moderate malnutrition ([facility] [address]       PLAN:   Frostbite, right foot  Full-thickness ulceration, left foot  -Patient examined and evaluated. -WBC 3.0; patient afebrile  -Blood cultures 1 and 2, preliminary results: No growth  -Swab cultures results: Staphylococcus aureus and Pseudomonas aeruginosa  -IV Zosyn discontinued; patient on p.o. Augmentin  -Reviewed MRI; impression above. Results equivocal for osteomyelitis. No definite changes consistent with osteomyelitis were noted.   -Reviewed lower extremity arterial Doppler; impression above  -Patient is 4 days s/p angiogram with intervention  -Applied nitroglycerin paste to right forefoot, Betadine wet-to-dry to distal right forefoot; wrapped loosely with Kerlix; applied Mesalt packing to wound on dorsal left foot, betadine to wound periphery, and wrapped with loosely applied kerlix.  -Patient okay to weight-bear as tolerated while wearing the postop shoe; encouraged patient to minimize weightbearing  -Placed order for daily dressing changes with nitroglycerin paste to the dorsal aspect of the foot bilaterally, Betadine wet-to-dry to distal right forefoot and wound to dorsum of left forefoot wrapped with Kerlix rolls.  -Discussed with patient that it is dangerous to perform amputation before demarcation of skin. There is significant risk that amputation margins will become necrotic and that he will require more proximal amputation  -We will wait for clean demarcation of viable versus nonviable tissue before performing definitive amputation. There is no urgent need for performing definitive amputation. This can be performed outpatient.  -Awaiting to hear back from SNF/ECF regarding accepting patient when bed becomes available  -Patient is okay for discharge provided that he will be able to receive daily dressing changes as above. Patient may follow-up outpatient with Dr. Jeniffer Rashid for determining level of demarcation of viable versus nonviable tissue, and definitive amputation.  -Patient verbalized understanding and agreement with the treatment plan as stated. All of his questions were answered to satisfaction. DISPO: Patient is stable for discharge per podiatry; follow-up outpatient with Dr. Jeniffer Rashid, will need close follow-up upon discharge to ensure wounds of bilateral feet do not get infected and require more urgent amputation.     Mg Angel DPM, PGY-2  Podiatric Surgical Resident  1/21/2022   5:51 PM

## 2022-01-21 NOTE — PROGRESS NOTES
HOSPITALIST PROGRESS NOTE    Patient:  Aida Serrano    Unit/Bed:7K-27/027-A  YOB: 1960  MRN: [id number]   PCP: No primary care provider on file. Date of Admission: 1/10/2022  Date of Service: 1/21/2022  Chief Complaint:- Foot pain    Assessment and Plan:  1. Bilateral LE Ulcers/Eschar/?Frostbite on RLE: Patient presents with what appears to be frostbite of the right foot as well as a full-thickness ulceration of the left foot. Patient is afebrile however leukocytosis was present on arrival.  All digits of the right foot appear necrotic, foul-smelling and gangrenous which extends to the metatarsal level. The left foot has an open ulcer on the dorsal aspect near the second digit however does not appear to be necrotic appearing as the right foot. CTA of the abdominal aorta with bilateral runoff shows an area of significant narrowing in the distal right superficial femoral artery with what appears to be relatively normal flow in the left LE circulation. Podiatry consulted and performing daily wound care/Betadine debridement. MRI 1/13/2022 reveals what appears to be osteomyelitis in the first digit and metatarsal and soft tissue gas as noted by radiologist.  Patient underwent RLE arteriogram with angioplasty on 1/17/2022 by IR. · As per podiatry, plan for eventual TMA as outpatient. No indication for immediate amputation at this time. · Continue Augmentin. No further indication for pseudomonal coverage given length of therapy with Zosyn. Augmentin will continue for another 7 days for full course of treatment for deep skin infections. 2. 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 Iron studies done suggest inflammatory anemia. Benign reticulocyte count, however patient has been having a drop in his chronic macrocytic hemoglobin compared to his usual baseline.   We will continue to monitor/trend, unclear if patient will follow up with specialist as outpatient but will make suggestion if need be.  3. History of EtOH Abuse: Patient no longer agitated as seen in alcohol abuse. Sitter at bedside. 4. Diffuse Alcoholic Fatty Liver Disease without Cirrhosis: Evidence for acute EtOH toxic insult due to mild transaminitis with AST > ALT. Both now downtrending, indicating recovery from EtOH toxic insult. INR 1.56. Platelet count greater than 150,000. Ultrasound of the liver shows fatty infiltration with concurrent hypoechogenicity, normal color flow via Doppler studies. No CBD dilatation. Patient at risk for developing fibrotic changes with continued drinking and eventual cirrhosis. 5. Severe Hypoalbuminemia: Due to severe malnutrition and chronic EtOH use. · IV albumin x3 doses given on 1/12-1/13/2022   · Dietitian following  6. Moderate Acute on Chronic Asymptomatic Hypovolemic Hyponatremia: Plateaued now, stable nearing baseline. Current still Na 131. Urine Na > 20, Urine osm > 100. · Continue with IV NS.  7. Mild Rhabdomyolysis: Resolved. 8. Disposition: Patient ok for discharge per podiatry. However patient requested assisted living, which won't be approved due to insurance, and wishes to now go to a nursing home. SW currently investigating options for ECF. Subjective (Past 24 hour events):  No acute events overnight. Sitter at bedside. Patient quiet as usual, no new complaints or changes in ROS. HPI:  70-year-old homeless male with PMH of EtOH and cocaine abuse who came to the ED on 1/10/2022 due to bilateral lower extremity pain and numbness. On arrival patient was noted to be an extremely poor historian. Patient did state that he has had lower extremity numbness and pain for about 2 weeks. Not sure how long he has had ulcers in the foot. Admits to drinking every day, about 6 drinks per day for several years. Denied any chronic medical problems.   Physical exam in the ED showed bilateral lower extremity eschars along with ulcers and discolored toes. Admission labs showed multiple significant abnormalities which included hyponatremia, hypokalemia, hyperbilirubinemia leukocytosis, macrocytic anemia, rhabdomyolysis. Patient admitted for these bilateral ulcers/eschar and multiple lab abnormalities. Please see Assessment and Plan for further details on hospital course. Scheduled Meds:   amoxicillin-clavulanate  1 tablet Oral 2 times per day    clopidogrel  75 mg Oral Daily    nitroglycerin  0.5 inch Topical Daily    nicotine  1 patch TransDERmal Daily    phosphorus replacement protocol   Other RX Placeholder    calcium replacement protocol   Other RX Placeholder    sodium chloride flush  5-40 mL IntraVENous 2 times per day    enoxaparin  40 mg SubCUTAneous Q24H    thiamine  100 mg Oral Daily    multivitamin  1 tablet Oral Daily     Continuous Infusions:   sodium chloride 75 mL/hr at 01/20/22 0403    sodium chloride         PHYSICAL EXAMINATION:  Blood pressure 139/72, pulse 82, temperature 98 °F (36.7 °C), temperature source Oral, resp. rate 16, height 6' (1.829 m), weight 140 lb (63.5 kg), SpO2 98 %. Oxygen Delivery -    General: Acute on chronic ill appearing. Appears disheveled. In no acute distress. HEENT:  normocephalic and atraumatic. No scleral icterus. PERR  Neck: supple. No Thyromegaly. Lungs: clear to auscultation. No retractions  Cardiac: RRR. No JVD. Abdomen: soft. Nontender. Extremities: Open ulceration on the dorsum of the left foot. Right foot from metatarsals to toes are covered in Ace wraps soaked in Betadine. See below for appearance of feet prior to bandaging/wound care. Vasculature: delayed capillary refill. Skin: cool and dry in the LE b/l  Psych:  Alert and oriented x3. Lymph:  No supraclavicular adenopathy. Neurologic:  No focal deficit.  No seizures                        Diagnostic Data: (All radiographs, EKGs, PFTs, and imaging are personally viewed and interpreted unless otherwise noted). CBC:   Recent Labs     01/19/22  0730 01/20/22  0632 01/21/22  0651   WBC 2.3* 2.0* 3.0*   HGB 9.0* 9.4* 10.3*    158 205     BMP:    Recent Labs     01/19/22  0730 01/19/22  0730 01/20/22  0632 01/20/22  1114 01/21/22  0651   *  --  132*  --  135   K 3.1*   < > 2.9* 3.2* 3.5     --  101  --  105   CO2 21*  --  20*  --  21*   BUN 4*  --  4*  --  5*   CREATININE 0.6  --  0.5  --  0.5   GLUCOSE 101  --  94  --  108    < > = values in this interval not displayed. Ionized Calcium: No results for input(s): IONCA in the last 72 hours. Magnesium:   Recent Labs     01/20/22  1114   MG 1.4*     Phosphorus:   No results for input(s): PHOS in the last 72 hours. Hepatic:   No results for input(s): AST, ALT, ALB, BILITOT, ALKPHOS in the last 72 hours. Vitamin B12: No components found for: B12  Folate:   Lab Results   Component Value Date    FOLATE 9.8 01/10/2022     IRON:   Lab Results   Component Value Date    IRON 21 01/17/2022     Iron Saturation: No components found for: PERCENTFE  TIBC:   Lab Results   Component Value Date    TIBC 117 01/17/2022     Ferritin:   Lab Results   Component Value Date    FERRITIN 806 01/17/2022     Troponin:   Lab Results   Component Value Date    TROPONINT < 0.010 07/06/2021    TROPONINT < 0.010 09/01/2014     BNP: No results found for: BNP  Lipids: No results found for: LDL  INR:   Lab Results   Component Value Date    INR 1.56 01/12/2022     ABG: No results found for: PH, PCO2, PO2, HCO3, O2SAT  TSH:   Lab Results   Component Value Date    TSH 1.650 06/27/2017     Sed Rate: No results found for: SEDRATE  CRP:   Lab Results   Component Value Date    CRP 12.22 01/10/2022     UA:   No results for input(s): Coreen Gash, COLORU, CLARITYU, MUCUS, PROTEINU, BLOODU, RBCUA, [address], BACTERIA, NITRU, GLUCOSEU, BILIRUBINUR, UROBILINOGEN, KETUA, LABCAST, LABCASTTY, AMORPHOS in the last 72 hours.     Invalid input(s): CRYSTALS  Micro:   Lab Results   Component Diffuse osteopenia. 5. Soft tissue swelling over the dorsum of foot. . **This report has been created using voice recognition software. It may contain minor errors which are inherent in voice recognition technology. ** Final report electronically signed by DR Arnold Blanco on 1/10/2022 2:03 PM    IR ANGIOGRAM EXTREMITY RIGHT    Result Date: 1/17/2022  PROCEDURE: AORTOFEMORAL ANGIOGRAM/with runoff procedure/angioplasty: CLINICAL INFORMATION: 68-year-old male with right lower extremity pain and balloons. Abnormal findings and previous right lower extremity vascular ultrasound. PERFORMED BY:  Dr. Immanuel Pandey. Luna Medina MD APPROACH: Left common femoral artery, micropuncture technique. CATHETERS: 5 Western Silva VCF, 5 Lao angled glide catheter, 4 mm x 25 cm inpact admiral balloon STENTS: None. VESSELS CATHETERIZED: Upper abdominal aorta, lower abdominal aorta, right and left external iliac arteries. Deep Brocks DIAGNOSTIC PROCEDURES: Abdominal aortogram, pelvic angiography, right leg arteriogram. INTERVENTIONS: Recanalization of the right superficial femoral artery. Angioplasty of multiple segments stenosis throughout the right superficial femoral and popliteal arteries. FLUOROSCOPY TIME: 19 minutes 7 seconds. FLUOROSCOPIC IMAGES: 109 SEDATION: Versed 2mg ; fentanyl 200mcg , IV; the patient was sedated for 90 minutes during this procedure and monitored with EKG and pulse ox monitoring devices by registered nurse. Face-to-face time with the patient was 90 minutes. OTHER MEDICATIONS: Heparin, 4000 units, IV. Patient was started on Plavix regimen, 75 mg, by mouth, daily, X. 45 days, starting with a 300 mg loading dose today. CONTRAST: 195 ml, Isovue-300. CLOSURE: Angio-Seal device, successful without complication. TECHNIQUE: Signed informed consent was obtained prior to performing this procedure. The patient was sedated, as indicated above. Access was obtained and a 5 Western Silva vascular sheath was inserted.  The procedures as above were then performed. FINDINGS: AORTA: The aorta is unremarkable. The renal arteries are widely patent. PELVIC VESSELS: Both common, internal, and external iliac arteries are widely patent. Angiogram right leg: CFA AND PROFUNDA: The common and deep femoral arteries are widely patent. SFA AND POPLITEAL ARTERY: There is complete occlusion of the mid right SFA. There are extensive collateral vessels which reconstitute the distal SFA/popliteal artery. There are innumerable additional areas of multifocal stenosis throughout the superficial femoral artery as well as the popliteal artery. TRIFURCATION VESSELS: All 3 trifurcation vessels are widely patent. At this point it was then decided to perform an intervention. A exchange length 0.035 inch stiff Glidewire was advanced over the aortic bifurcation into the superficial femoral artery. The existing 5 Noreen Chalk vascular sheath was removed and a long 6 Noreen Chalk  ANL sheath was advanced. The wire in conjunction with a glide catheter was successfully advanced through the stenotic portions of the right SFA and popliteal artery. Angioplasty was subsequently performed throughout the right leg. Post angioplasty images demonstrate significantly improved flow through the right lower extremity. The patient tolerated the procedure well with no immediate postprocedural complication. An Angio-Seal device was used for hemostasis at the left groin with no complication. Complete occlusion of the mid right SFA as well as multifocal stenosis throughout the SFA and popliteal artery, successfully treated with angioplasty with an excellent result. **This report has been created using voice recognition software. It may contain minor errors which are inherent in voice recognition technology. ** Final report electronically signed by Dr Eva Swanson on 1/17/2022 6:36 PM    CTA ABDOMINAL AORTA W BILAT RUNOFF W WO CONTRAST    Result Date: 1/10/2022  PROCEDURE: CTA ABDOMINAL AORTA W BILAT RUNOFF W WO superficial femoral, popliteal, anterior and posterior tibial arteries. There is an intramedullary jaleesa in the right tibia. 1. Limited evaluation of the right leg secondary to patient motion artifact. 2. Relatively normal flow in the abdominal aorta, visceral arteries, right and left common, internal and external iliac arteries common femoral arteries. 3. There is an area of significant narrowing in the distal right superficial femoral artery. 4. There is relatively normal flow in the left common and superficial femoral, popliteal, anterior and posterior tibial arteries. 5. Intramedullary jaleesa in the right tibia-fibula. 6. Mild diffuse fatty replacement in the liver. 7. Punctate calcifications in spleen. 8. Small bilateral adrenal nodules. 9. Atrophic pancreas. Dilated pancreatic duct. 10. 2.2 cm fluid collection in the region of the splenic hilum. 11. Enlarged prostate gland. **This report has been created using voice recognition software. It may contain minor errors which are inherent in voice recognition technology. ** Final report electronically signed by DR Kylah Vernon on 1/10/2022 9:10 PM    US LIVER    Result Date: 1/13/2022  PROCEDURE: US LIVER, US DUP ABD PEL RETRO SCROT COMPLETE CLINICAL INFORMATION: Alcoholic cirrhosis. COMPARISON: CTA of the abdominal aorta dated 10 January 2022. TECHNIQUE: Multiplanar sonographic images were obtained of the liver. FINDINGS:  Liver - L= 20.7 cm Gallbladder - 9.4 x 2.8 x 2.7 cm Gallbladder Wall - 1.2 cm Common Duct - 0.4 cm Lopez's Sign: neg There is increased echogenicity in the liver. There are no liver masses. There is no intrahepatic biliary ductal dilatation. There is ascites adjacent to the left lobe of the liver. There is normal color flow and spectral analysis in the main portal vein, right and left portal veins, hepatic artery, inferior vena cava and all 3 hepatic veins. The pancreas was not adequately visualized. . There are small gallstones.  There is a thickened gallbladder wall and pericholecystic fluid. . . The common bile duct is normal and measures 4 mm. There is fluid in the hepatorenal fossa. There is an area of abnormal echogenicity anterior to the inferior vena cava posterior to the liver. These findings may represent abnormal hepatic flexure and ascending colon possibly secondary to colitis. Please correlate clinically. 1. Increased echogenicity in the liver consistent with fatty infiltration. 2. There are gallstones, gallbladder wall thickening and pericholecystic fluid. 3. There is ascites adjacent to the left lobe of liver and fluid in the hepatorenal fossa. 4. Small area of abnormal echogenicity anterior to the inferior vena cava posterior to the liver. In correlation with previous CT angiogram, these findings may represent an abnormal hepatic flexure and ascending colon possibly related to colitis. Please correlate clinically. . **This report has been created using voice recognition software. It may contain minor errors which are inherent in voice recognition technology. ** Final report electronically signed by DR Wes Jurado on 1/13/2022 1:29 PM    MRI FOOT LEFT WO CONTRAST    Result Date: 1/13/2022  PROCEDURE: MRI FOOT LEFT WO CONTRAST CLINICAL INFORMATION: Ulcer to dorsal left foot between metatarsals 1 and 2; ulcer probes to periosteum; rule out osteomyelitis COMPARISON: Left foot radiographs 1/10/2022 TECHNIQUE: Routine MRI of the left foot without contrast. FINDINGS: POSTOPERATIVE CHANGES: None. ALIGNMENT: Anatomic. MARROW SIGNAL/MARROW EDEMA/FRACTURE: 1. Fatty marrow signal is seen. 2. There is mild increase signal on STIR within the first metatarsal without a corresponding abnormality on T1 weighted imaging. Findings likely relate to reactive edema.  3. There is a linear hyperintensity at the base of the proximal phalanx of the fifth digit without corresponding T1 hypointensity that would suggest a stress reaction or reactive bone marrow edema. PLAFOND/TALAR DOME/ANKLE MORTISE: 1. Only evaluated on the sagittal view. 2. There is no osteochondral fracture. 3. The ankle mortise in intact. 4. Mild degenerative changes of the ankle joint. JOINTS: There is hallux valgus deformity of the first digit. Severe degenerative changes at the first metatarsophalangeal joint. Mild degenerative changes seen in the remainder of the left foot. TENDONS: Limited evaluation. Grossly intact. SINUS TARSI/TARSAL TUNNEL: Unremarkable. MUSCULATURE: No significant atrophy JOINT FLUID: 1. Physiologic. SOFT TISSUES: Susceptibility artifact seen in the region of the proximal phalanx of the first digit likely relates to the presence of a soft tissue gas. The soft tissue ulcer appears to approximate the proximal phalanx of the first digit. Mild thickening  of the proximal plantar fascia. 1. The soft tissue ulcer appears to approximate the proximal phalanx of the first digit. Soft tissue gas is noted. Osteomyelitis cannot be excluded. 2. Increased bone marrow signal on STIR without corresponding T1 hypointensity in the first metatarsal is a nonspecific findings. Likely relates to reactive edema at this time. The finding is equivocal for osteomyelitis. 3. Linear hyperintensity and STIR in the base of the proximal phalanx of the fifth digit without corresponding T1 hypointensity likely relates to reactive bone marrow edema versus a stress reaction. Clinical correlation is recommended. 4. Marked degenerative changes of the first metatarsophalangeal joint. Hallux valgus deformity. **This report has been created using voice recognition software. It may contain minor errors which are inherent in voice recognition technology. ** Final report electronically signed by Dr Lorna Barrett on 1/13/2022 11:52 AM    VL DUP LOWER EXTREMITY ARTERIES BILATERAL    Result Date: 1/14/2022  PROCEDURE: VL DUP LOWER EXTREMITY ARTERIES BILATERAL CLINICAL INFORMATION: Assess blood flow for healing potential; possible surgical intervention in the near future (right foot TMA), Patient has frostbite to right distal forefoot; patient has full-thickness ulceration to dorsal left forefoot COMPARISON: No comparison study available. TECHNIQUE: Multiple permanent grayscale and color flow sonographic images of the major arteries of both lower extremities were obtained from the level of the groin to the level of the ankle . Spectral Doppler waveforms were obtained and velocity measurements were measured. FINDINGS: RIGHT ARTERY (PSV cm/sec) CFA ---------------> 127 PSV cm/sec PROF -------------> 244 PSV cm/sec SFA PROX -------> 53 PSV cm/sec SFA MID ----------> 0 PSV cm/sec SFA DIST --------> 24 PSV cm/sec POP A PROX ---> 63 PSV cm/sec POP A DIST ----> 56 PSV cm/sec PTA ---------------> 57 PSV cm/sec PERONEAL -----> 17 PSV cm/sec KAREN ----------------> 30 PSV cm/sec LEFT ARTERY (PSV cm/sec) CFA ---------------> 172 PSV cm/sec PROF -------------> 84 PSV cm/sec SFA PROX -------> 84 PSV cm/sec SFA MID ----------> 124 PSV cm/sec SFA DIST --------> 95 PSV cm/sec POP A PROX ---> 121 PSV cm/sec POP A DIST ----> 152 PSV cm/sec PTA ---------------> 68 PSV cm/sec PERONEAL -----> 41 PSV cm/sec KAREN ----------------> 77 PSV cm/sec ROXANE RIGHT KAREN----->0.53 PTA----->0.59 TBI------>0 RXOANE LEFT KAREN----->0.92 PTA----->0.95 TBI------>0.72 VELOCITY MEASUREMENTS: Moderately diminished ABIs right side. Normal ABIs left side. RIGHT LEG: Excellent pulsatility in the common femoral artery. Significant step-off in velocity in the profunda, suggesting a mild stenosis. Moderately dampened pulsatility is seen in the proximal SFA with occlusion of the mid SFA and collateral reconstitution of the distal SFA with severely dampened monophasic flow. Relatively good flow is seen in the popliteal artery, however, as well as in the posterior tibial artery.  There is moderately dampened pulsatility in the anterior tibial artery with  severely dampened pulsatility in the peroneal artery. LEFT LEG: Excellent pulsatility in the common femoral artery. Good pulsatility in the profunda. Good pulsatility is seen in the SFA diffusely as well as in the popliteal artery and all 3 trifurcation vessels. 1. Normal findings in the left leg. 2. Abnormal findings in the right leg,. Findings suggestive of mild stenosis at the origin of the profunda and possibly at the origin right SFA. Total occlusion mid right SFA with distal collateral reconstitution. Also evidence for trifurcation disease right side. 3. Aortofemoral angiography is recommended for further evaluation with possible intervention. If desired, this can be arranged through the interventional scheduling desk of [facility]'s radiology department ([phone]). **This report has been created using voice recognition software. It may contain minor errors which are inherent in voice recognition technology. ** Final report electronically signed by Dr. Ana Lilia Presley on 1/14/2022 11:14 AM    US DUP ABD PEL RETRO SCROT COMPLETE    Result Date: 1/13/2022  PROCEDURE: US LIVER, US DUP ABD PEL RETRO SCROT COMPLETE CLINICAL INFORMATION: Alcoholic cirrhosis. COMPARISON: CTA of the abdominal aorta dated 10 January 2022. TECHNIQUE: Multiplanar sonographic images were obtained of the liver. FINDINGS:  Liver - L= 20.7 cm Gallbladder - 9.4 x 2.8 x 2.7 cm Gallbladder Wall - 1.2 cm Common Duct - 0.4 cm Lopez's Sign: neg There is increased echogenicity in the liver. There are no liver masses. There is no intrahepatic biliary ductal dilatation. There is ascites adjacent to the left lobe of the liver. There is normal color flow and spectral analysis in the main portal vein, right and left portal veins, hepatic artery, inferior vena cava and all 3 hepatic veins. The pancreas was not adequately visualized. . There are small gallstones. There is a thickened gallbladder wall and pericholecystic fluid. . .  The common bile duct is normal and measures 4 mm. There is fluid in the hepatorenal fossa. There is an area of abnormal echogenicity anterior to the inferior vena cava posterior to the liver. These findings may represent abnormal hepatic flexure and ascending colon possibly secondary to colitis. Please correlate clinically. 1. Increased echogenicity in the liver consistent with fatty infiltration. 2. There are gallstones, gallbladder wall thickening and pericholecystic fluid. 3. There is ascites adjacent to the left lobe of liver and fluid in the hepatorenal fossa. 4. Small area of abnormal echogenicity anterior to the inferior vena cava posterior to the liver. In correlation with previous CT angiogram, these findings may represent an abnormal hepatic flexure and ascending colon possibly related to colitis. Please correlate clinically. . **This report has been created using voice recognition software. It may contain minor errors which are inherent in voice recognition technology. ** Final report electronically signed by DR Yeison Tejeda on 1/13/2022 1:29 PM      Aziza Iraheta DPM

## 2022-01-21 NOTE — CARE COORDINATION
1/21/22, 12:05 PM EST    DISCHARGE PLANNING EVALUATION      Referral made to [address] [place] [address].   Facility is reviewing

## 2022-01-22 LAB
ANION GAP SERPL CALCULATED.3IONS-SCNC: 10 MEQ/L (ref 8–16)
BASOPHILS # BLD: 0.7 %
BASOPHILS ABSOLUTE: 0 THOU/MM3 (ref 0–0.1)
BUN BLDV-MCNC: 4 MG/DL (ref 7–22)
CALCIUM SERPL-MCNC: 8.2 MG/DL (ref 8.5–10.5)
CHLORIDE BLD-SCNC: 104 MEQ/L (ref 98–111)
CO2: 21 MEQ/L (ref 23–33)
CREAT SERPL-MCNC: 0.4 MG/DL (ref 0.4–1.2)
EOSINOPHIL # BLD: 1.7 %
EOSINOPHILS ABSOLUTE: 0.1 THOU/MM3 (ref 0–0.4)
ERYTHROCYTE [DISTWIDTH] IN BLOOD BY AUTOMATED COUNT: 14.6 % (ref 11.5–14.5)
ERYTHROCYTE [DISTWIDTH] IN BLOOD BY AUTOMATED COUNT: 54.4 FL (ref 35–45)
GFR SERPL CREATININE-BSD FRML MDRD: > 90 ML/MIN/1.73M2
GLUCOSE BLD-MCNC: 88 MG/DL (ref 70–108)
HCT VFR BLD CALC: 30 % (ref 42–52)
HEMOGLOBIN: 10.1 GM/DL (ref 14–18)
IMMATURE GRANS (ABS): 0.02 THOU/MM3 (ref 0–0.07)
IMMATURE GRANULOCYTES: 0.5 %
LYMPHOCYTES # BLD: 47.3 %
LYMPHOCYTES ABSOLUTE: 1.9 THOU/MM3 (ref 1–4.8)
MCH RBC QN AUTO: 33.9 PG (ref 26–33)
MCHC RBC AUTO-ENTMCNC: 33.7 GM/DL (ref 32.2–35.5)
MCV RBC AUTO: 100.7 FL (ref 80–94)
MONOCYTES # BLD: 13.5 %
MONOCYTES ABSOLUTE: 0.6 THOU/MM3 (ref 0.4–1.3)
NUCLEATED RED BLOOD CELLS: 0 /100 WBC
PLATELET # BLD: 227 THOU/MM3 (ref 130–400)
PMV BLD AUTO: 10.9 FL (ref 9.4–12.4)
POTASSIUM SERPL-SCNC: 3 MEQ/L (ref 3.5–5.2)
POTASSIUM SERPL-SCNC: 3.6 MEQ/L (ref 3.5–5.2)
RBC # BLD: 2.98 MILL/MM3 (ref 4.7–6.1)
SEG NEUTROPHILS: 36.3 %
SEGMENTED NEUTROPHILS ABSOLUTE COUNT: 1.5 THOU/MM3 (ref 1.8–7.7)
SODIUM BLD-SCNC: 135 MEQ/L (ref 135–145)
WBC # BLD: 4.1 THOU/MM3 (ref 4.8–10.8)

## 2022-01-22 PROCEDURE — 84132 ASSAY OF SERUM POTASSIUM: CPT

## 2022-01-22 PROCEDURE — 80048 BASIC METABOLIC PNL TOTAL CA: CPT

## 2022-01-22 PROCEDURE — 6370000000 HC RX 637 (ALT 250 FOR IP): Performed by: STUDENT IN AN ORGANIZED HEALTH CARE EDUCATION/TRAINING PROGRAM

## 2022-01-22 PROCEDURE — 99232 SBSQ HOSP IP/OBS MODERATE 35: CPT | Performed by: FAMILY MEDICINE

## 2022-01-22 PROCEDURE — 6370000000 HC RX 637 (ALT 250 FOR IP): Performed by: PHYSICIAN ASSISTANT

## 2022-01-22 PROCEDURE — 85025 COMPLETE CBC W/AUTO DIFF WBC: CPT

## 2022-01-22 PROCEDURE — 2580000003 HC RX 258: Performed by: HOSPITALIST

## 2022-01-22 PROCEDURE — 1200000000 HC SEMI PRIVATE

## 2022-01-22 PROCEDURE — 6370000000 HC RX 637 (ALT 250 FOR IP): Performed by: INTERNAL MEDICINE

## 2022-01-22 PROCEDURE — 2580000003 HC RX 258: Performed by: INTERNAL MEDICINE

## 2022-01-22 PROCEDURE — 6370000000 HC RX 637 (ALT 250 FOR IP): Performed by: RADIOLOGY

## 2022-01-22 PROCEDURE — 6360000002 HC RX W HCPCS: Performed by: INTERNAL MEDICINE

## 2022-01-22 PROCEDURE — 36415 COLL VENOUS BLD VENIPUNCTURE: CPT

## 2022-01-22 PROCEDURE — 2500000003 HC RX 250 WO HCPCS: Performed by: STUDENT IN AN ORGANIZED HEALTH CARE EDUCATION/TRAINING PROGRAM

## 2022-01-22 RX ADMIN — NITROGLYCERIN 0.5 INCH: 20 OINTMENT TOPICAL at 09:46

## 2022-01-22 RX ADMIN — HYDROCODONE BITARTRATE AND ACETAMINOPHEN 1 TABLET: 5; 325 TABLET ORAL at 08:42

## 2022-01-22 RX ADMIN — AMOXICILLIN AND CLAVULANATE POTASSIUM 1 TABLET: 875; 125 TABLET, FILM COATED ORAL at 19:55

## 2022-01-22 RX ADMIN — CLOPIDOGREL BISULFATE 75 MG: 75 TABLET, FILM COATED ORAL at 08:42

## 2022-01-22 RX ADMIN — SODIUM CHLORIDE, PRESERVATIVE FREE 10 ML: 5 INJECTION INTRAVENOUS at 20:19

## 2022-01-22 RX ADMIN — Medication 100 MG: at 08:42

## 2022-01-22 RX ADMIN — Medication 1 TABLET: at 08:42

## 2022-01-22 RX ADMIN — HYDROCODONE BITARTRATE AND ACETAMINOPHEN 1 TABLET: 5; 325 TABLET ORAL at 19:55

## 2022-01-22 RX ADMIN — POTASSIUM CHLORIDE 10 MEQ: 7.46 INJECTION, SOLUTION INTRAVENOUS at 11:22

## 2022-01-22 RX ADMIN — SODIUM CHLORIDE: 9 INJECTION, SOLUTION INTRAVENOUS at 01:31

## 2022-01-22 RX ADMIN — POTASSIUM CHLORIDE 10 MEQ: 7.46 INJECTION, SOLUTION INTRAVENOUS at 15:53

## 2022-01-22 RX ADMIN — POTASSIUM CHLORIDE 10 MEQ: 7.46 INJECTION, SOLUTION INTRAVENOUS at 12:47

## 2022-01-22 RX ADMIN — Medication 6 MG: at 19:55

## 2022-01-22 RX ADMIN — POTASSIUM CHLORIDE 10 MEQ: 7.46 INJECTION, SOLUTION INTRAVENOUS at 14:15

## 2022-01-22 RX ADMIN — HYDROXYZINE HYDROCHLORIDE 25 MG: 25 TABLET ORAL at 19:55

## 2022-01-22 RX ADMIN — POTASSIUM CHLORIDE 10 MEQ: 7.46 INJECTION, SOLUTION INTRAVENOUS at 17:08

## 2022-01-22 RX ADMIN — ENOXAPARIN SODIUM 40 MG: 100 INJECTION SUBCUTANEOUS at 19:54

## 2022-01-22 RX ADMIN — METOPROLOL TARTRATE 5 MG: 5 INJECTION INTRAVENOUS at 20:06

## 2022-01-22 RX ADMIN — AMOXICILLIN AND CLAVULANATE POTASSIUM 1 TABLET: 875; 125 TABLET, FILM COATED ORAL at 08:42

## 2022-01-22 RX ADMIN — POTASSIUM CHLORIDE 10 MEQ: 7.46 INJECTION, SOLUTION INTRAVENOUS at 08:40

## 2022-01-22 ASSESSMENT — PAIN SCALES - GENERAL
PAINLEVEL_OUTOF10: 10

## 2022-01-22 ASSESSMENT — PAIN DESCRIPTION - LOCATION
LOCATION: BACK
LOCATION: BACK

## 2022-01-22 ASSESSMENT — PAIN DESCRIPTION - PAIN TYPE
TYPE: CHRONIC PAIN
TYPE: CHRONIC PAIN

## 2022-01-22 ASSESSMENT — PAIN DESCRIPTION - ORIENTATION
ORIENTATION: LOWER
ORIENTATION: LOWER

## 2022-01-22 NOTE — PROGRESS NOTES
put into assisted living at discharge as they have a warm room and cable TV. Patient is concerned about his ability to be functional after an amputation. Patient endorses chronic shortness of breath that is unchanged recently, but denies any nausea, vomiting, fever, chills, chest pain, shortness of breath. No other pedal complaints. 1/8/2022  Patient seen at bedside today on behalf of Dr. Gregorio Burrows. Patient is awake, and is alert and oriented to person, place, and time. Patient continues to endorse 10/10 pain to his legs; he also endorses 10/10 pain from his lower back. Otherwise, patient denies any N/V/F/C/SOB/CP or bilateral calf tenderness. Patient is curious to know what the timeline is for his eventual amputation surgery. He has no other pedal complaints at this time. 1/17/2022  Patient seen at bedside this morning on behalf of Dr. Gregorio Burrows. Patient is alert and oriented to person, place, and time. He is not in acute distress. Patient states that overall, he is feeling good. He endorses pain to his bilateral lower extremities, and rates the pain as 10/10. Prior to today, patient denied any pain in bilateral lower extremities and stated that he could not feel anything that is bilateral lower extremities were constantly numb. Patient asked if he could be allowed outside to smoke, as he states that the nicotine replacement therapy is not working well for him. Patient would like to know when his right foot will be amputated. Otherwise, he denies any N/V/F/C/SOB/CP or bilateral calf tenderness. He has no other pedal complaints at this time. 1/16/2022  Patient seen at bedside today on behalf of Dr. Gregorio Burrows. Patient is alert and oriented to person, place, and time. Patient is extremely displeased that he is still in the hospital. He would like to be able to go smoke. He wants his right foot amputated, and is unsure why the amputation is being delayed.  He denies any nausea, vomiting, fever, chills, chest pain, shortness of breath. No other pedal complaints. 1/15/2022  Patient seen at bedside today on behalf of Dr. Nabila Hillman. Patient alert and oriented. Patient continues to deny any pain to bilateral feet. He also denies any N/V/F/C/SOB/CP. That he is still at high risk for losing the digits of the right foot. Otherwise no new complaints. 1/14/2022  Patient seen at bedside today on behalf of Dr. Nabila Hillman. Patient alert and oriented. Patient continues to deny any pain to bilateral feet. He also denies any N/V/F/C/SOB/CP or bilateral calf tenderness. Patient inquired as to which of his toes he may be a losing; reiterated to patient that he has frostbite involving all 5 digits on the right foot, and that he is going to lose all 5 digits of the right foot. Otherwise, patient has no other pedal complaints at this time. 1/13/2022  Patient seen at bedside today on behalf of Dr. Nabila Hillman. Patient was somnolent during encounter; Per sitter, patient has been sleepy all day. Patient denies any pain to bilateral feet. He currently denies any N/V/F/C/SOB/CP or bilateral calf tenderness. He has no new pedal complaints at this time. 1/12/2022  Patient seen at bedside today alongside Dr. Nabila Hillman. Patient appeared pleasant, was oriented to person, place and time and in no acute distress. Patient denies any pain to either of his feet. Patient also denies any N/V/F/C/SOB or CP. Patient has no further pedal concerns at this point in time. HPI:  The patient is a homeless 63 y.o. male with significant past medical history of substance abuse (cocaine use, alcohol) tobacco use, and incarcerated left inguinal hernia who being seen at bedside today on behalf of Dr. Nabila Hillman. Patient was admitted yesterday via [facility] ED for frostbite, foot wound, and lab abnormalities. Patient is pleasant, but very poor historian.   Patient does not know how long he has had frostbite changes to his right foot nor the wound on his left foot. He thinks they must have been there for several weeks. He relates that his feet have been hurting for several weeks; however when asked by RN prior to getting pain medication, patient stated he cannot feel any pain in his lower legs as they are numb all the time and all of his pain is from his lower back. Patient admits drinking approximately 6 drinks per day daily for the last several years. He currently denies any N/V/F/C/SOB/CP or bilateral calf tenderness. He has no other pedal complaints at this time.     Current Medications:    Current Facility-Administered Medications: guaiFENesin-dextromethorphan (ROBITUSSIN DM) 100-10 MG/5ML syrup 10 mL, 10 mL, Oral, Q4H PRN  amoxicillin-clavulanate (AUGMENTIN) 875-125 MG per tablet 1 tablet, 1 tablet, Oral, 2 times per day  ketorolac (TORADOL) injection 15 mg, 15 mg, IntraVENous, Q6H PRN  metoprolol (LOPRESSOR) injection 5 mg, 5 mg, IntraVENous, Q6H PRN  clopidogrel (PLAVIX) tablet 75 mg, 75 mg, Oral, Daily  nitroglycerin (NITRO-BID) 2 % ointment 0.5 inch, 0.5 inch, Topical, Daily  melatonin tablet 6 mg, 6 mg, Oral, Nightly PRN  hydrOXYzine (ATARAX) tablet 25 mg, 25 mg, Oral, Nightly PRN  nicotine (NICODERM CQ) 14 MG/24HR 1 patch, 1 patch, TransDERmal, Daily  haloperidol lactate (HALDOL) injection 1 mg, 1 mg, IntraMUSCular, Q6H PRN  0.9 % sodium chloride infusion, , IntraVENous, Continuous  HYDROcodone-acetaminophen (NORCO) 5-325 MG per tablet 1 tablet, 1 tablet, Oral, Q6H PRN  phosphorus replacement protocol, , Other, RX Placeholder  calcium replacement protocol, , Other, RX Placeholder  povidone-iodine (BETADINE) 10 % external solution, , Topical, PRN  sodium chloride flush 0.9 % injection 5-40 mL, 5-40 mL, IntraVENous, 2 times per day  sodium chloride flush 0.9 % injection 5-40 mL, 5-40 mL, IntraVENous, PRN  0.9 % sodium chloride infusion, 25 mL, IntraVENous, PRN  enoxaparin (LOVENOX) injection 40 mg, 40 mg, SubCUTAneous, Q24H  ondansetron (ZOFRAN-ODT) disintegrating tablet 4 mg, 4 mg, Oral, Q8H PRN **OR** ondansetron (ZOFRAN) injection 4 mg, 4 mg, IntraVENous, Q6H PRN  polyethylene glycol (GLYCOLAX) packet 17 g, 17 g, Oral, Daily PRN  acetaminophen (TYLENOL) tablet 650 mg, 650 mg, Oral, Q6H PRN **OR** acetaminophen (TYLENOL) suppository 650 mg, 650 mg, Rectal, Q6H PRN  thiamine tablet 100 mg, 100 mg, Oral, Daily  multivitamin 1 tablet, 1 tablet, Oral, Daily  potassium chloride (KLOR-CON M) extended release tablet 40 mEq, 40 mEq, Oral, PRN **OR** potassium bicarb-citric acid (EFFER-K) effervescent tablet 40 mEq, 40 mEq, Oral, PRN **OR** potassium chloride 10 mEq/100 mL IVPB (Peripheral Line), 10 mEq, IntraVENous, PRN  magnesium sulfate 2000 mg in 50 mL IVPB premix, 2,000 mg, IntraVENous, PRN  LORazepam (ATIVAN) tablet 1 mg, 1 mg, Oral, Q1H PRN **OR** LORazepam (ATIVAN) injection 1 mg, 1 mg, IntraVENous, Q1H PRN **OR** LORazepam (ATIVAN) tablet 2 mg, 2 mg, Oral, Q1H PRN **OR** LORazepam (ATIVAN) injection 2 mg, 2 mg, IntraVENous, Q1H PRN **OR** LORazepam (ATIVAN) tablet 3 mg, 3 mg, Oral, Q1H PRN **OR** LORazepam (ATIVAN) injection 3 mg, 3 mg, IntraVENous, Q1H PRN **OR** LORazepam (ATIVAN) tablet 4 mg, 4 mg, Oral, Q1H PRN **OR** LORazepam (ATIVAN) injection 4 mg, 4 mg, IntraVENous, Q1H PRN    Objective     BP (!) 153/73   Pulse 80   Temp 97.7 °F (36.5 °C) (Oral)   Resp 18   Ht 6' (1.829 m)   Wt 140 lb (63.5 kg)   SpO2 97%   BMI 18.99 kg/m²      I/O:    Intake/Output Summary (Last 24 hours) at 1/22/2022 1114  Last data filed at 1/22/2022 0709  Gross per 24 hour   Intake 3144.59 ml   Output 2475 ml   Net 669.59 ml              Wt Readings from Last 3 Encounters:   01/10/22 140 lb (63.5 kg)   07/06/21 150 lb (68 kg)   05/27/21 160 lb (72.6 kg)       LABS:    Recent Labs     01/21/22  0651 01/22/22  0426   WBC 3.0* 4.1*   HGB 10.3* 10.1*   HCT 31.0* 30.0*    227        Recent Labs     01/22/22  0426      K 3.0*      CO2 21*   BUN 4*   CREATININE 0.4        No results for input(s): PROT, INR, APTT in the last 72 hours. No results for input(s): CKTOTAL, CKMB, CKMBINDEX, TROPONINI in the last 72 hours. Focused Lower Extremity Examination:    Vitals:    BP (!) 153/73   Pulse 80   Temp 97.7 °F (36.5 °C) (Oral)   Resp 18   Ht 6' (1.829 m)   Wt 140 lb (63.5 kg)   SpO2 97%   BMI 18.99 kg/m²      Dermatologic: Dressings to BLE intact. There is a full-thickness ulceration noted to the dorsal aspect of left foot, between metatarsal necks 1 and 2. Wound bed with granular tissue with small areas of infarcted tissue. There is very scant serous drainage noted. No purulence or malodor noted. Wound appears stable at this time. Superficial thickness ulceration noted to distal aspect of left second digit; left second digit is mildly erythematous and edematous; erythema and edema appear to be improving. No drainage or malodor noted to superficial ulcer on the distal left second digit. On the right, there are extensive ischemic and necrotic changes to all 5 digits extending proximally to the distal metatarsal level. There is a deroofed blister noted to the dorsum of the right distal forefoot. There are superficial thickness ulcerations on the dorsal medial aspect of the right hallux and the distal tip of the left fifth digit. Digits 1 through 5 of the distal right forefoot are beginning to harden and turn very dark in coloration. There is increasing duskiness to the rest of the digits extending toward the metatarsophalangeal joints on the right foot. There is increasing necrosis noted to the distal lateral aspect of the patient's right foot. There is increasing demarcation noted between viable and nonviable tissue (seen in the photographs). There is slight malodor noted to the right forefoot.  Erythema present on the proximal aspect of frostbitten tissue of right foot.     Vascular: Dorsalis pedis and posterior tibial pulses are palpable bilaterally; DP and PT pulses are diminished to the RLE. Skin temperature is warm to warm from proximal tibial tuberosity to distal digits of left foot; skin temperature is warm to  cool from proximal tibial tuberosity to distal forefoot and digits of right foot. Right foot is appreciably warmer after revascularization procedure. CFT within normal limits to all 5 digits of left foot; CFT absent to distal tips of all 5 digits of right foot. Edema is present to RLE. Pedal hair is absent bilaterally.     Neurovascular: Light touch sensation grossly diminished to the midfoot bilaterally.      Musculoskeletal: Muscle strength 4/5 and symmetric for all muscle groups crossing the ankle joint bilaterally. Decreased ROM noted at the ankle and ST joints bilaterally. No pain with palpation of any foot or ankle structures. Foot and ankle grossly rectus alignment bilaterally. STUDIES:  XR, right foot  Impression       1. Postoperative changes in the distal tibia. 2. Abnormal density involving the neck of the fifth metacarpal.   3. Degenerative changes. 4. Diffuse osteopenia. 5. Soft tissue swelling over the dorsum of foot. .                   **This report has been created using voice recognition software. It may contain minor errors which are inherent in voice recognition technology. **       Final report electronically signed by DR Sal Espitia on 1/10/2022 2:03 PM      XR, left foot  Impression       1. Stable left foot radiographs, no interval change since previous study dated 30 January 2020.   2. Abnormal density involving the first metatarsophalangeal joint, unchanged. 3. Degenerative change. 4. No definite evidence of fracture, bony destruction or osteomyelitis. .                   **This report has been created using voice recognition software. It may contain minor errors which are inherent in voice recognition technology. **       Final report electronically signed by DR Sal Espitia on 1/10/2022 2:00 PM      CTA, abdominal aorta with bilateral runoff  Impression       1. Limited evaluation of the right leg secondary to patient motion artifact. 2. Relatively normal flow in the abdominal aorta, visceral arteries, right and left common, internal and external iliac arteries common femoral arteries. 3. There is an area of significant narrowing in the distal right superficial femoral artery. 4. There is relatively normal flow in the left common and superficial femoral, popliteal, anterior and posterior tibial arteries. 5. Intramedullary jaleesa in the right tibia-fibula. 6. Mild diffuse fatty replacement in the liver. 7. Punctate calcifications in spleen. 8. Small bilateral adrenal nodules. 9. Atrophic pancreas. Dilated pancreatic duct. 10. 2.2 cm fluid collection in the region of the splenic hilum. 11. Enlarged prostate gland.           **This report has been created using voice recognition software. It may contain minor errors which are inherent in voice recognition technology. **       Final report electronically signed by DR Jennifer Alcocer on 1/10/2022 9:10 PM     MRI, left foot  Impression       1. The soft tissue ulcer appears to approximate the proximal phalanx of the first digit. Soft tissue gas is noted. Osteomyelitis cannot be excluded.       2. Increased bone marrow signal on STIR without corresponding T1 hypointensity in the first metatarsal is a nonspecific findings. Likely relates to reactive edema at this time. The finding is equivocal for osteomyelitis.       3. Linear hyperintensity and STIR in the base of the proximal phalanx of the fifth digit without corresponding T1 hypointensity likely relates to reactive bone marrow edema versus a stress reaction. Clinical correlation is recommended.       4. Marked degenerative changes of the first metatarsophalangeal joint.  Hallux valgus deformity.               **This report has been created using voice recognition software.  It may wet-to-dry to distal right forefoot; wrapped loosely with Kerlix; applied Mesalt packing to wound on dorsal left foot, betadine to wound periphery, and wrapped with loosely applied kerlix.  -Patient okay to weight-bear as tolerated while wearing the postop shoe; encouraged patient to minimize weightbearing  -Placed order for daily dressing changes with nitroglycerin paste to the dorsal aspect of the foot bilaterally, Betadine wet-to-dry to distal right forefoot and wound to dorsum of left forefoot wrapped with Kerlix rolls.  -Discussed with patient that it is dangerous to perform amputation before demarcation of skin. There is significant risk that amputation margins will become necrotic and that he will require more proximal amputation  -We will wait for clean demarcation of viable versus nonviable tissue before performing definitive amputation. There is no urgent need for performing definitive amputation. This can be performed outpatient.  -Awaiting to hear back from [facility] [place]) regarding accepting patient when bed becomes available  -Patient is okay for discharge provided that he will be able to receive daily dressing changes as above. Patient may follow-up outpatient with Dr. Jeniffer Rashid for determining level of demarcation of viable versus nonviable tissue, and definitive amputation.  -Patient verbalized understanding and agreement with the treatment plan as stated. All of his questions were answered to satisfaction. DISPO: Patient is stable for discharge per podiatry; follow-up outpatient with Dr. Jeniffer Rashid, will need close follow-up upon discharge to ensure wounds of bilateral feet do not get infected and require more urgent amputation. Awaiting to hear back from [facility]) regarding placement.     Mg Angel DPM, PGY-2  Podiatric Surgical Resident  1/22/2022   11:14 AM

## 2022-01-22 NOTE — PROGRESS NOTES
HOSPITALIST PROGRESS NOTE    Patient:  Concepcion Rodriguez    Unit/Bed:7K-27/027-A  YOB: 1960  MRN: [id number]   PCP: No primary care provider on file. Date of Admission: 1/10/2022  Date of Service: 1/22/2022  Chief Complaint:- Foot pain    Assessment and Plan:  1. Bilateral LE Ulcers/Eschar/?Frostbite on RLE: Patient presents with what appears to be frostbite of the right foot as well as a full-thickness ulceration of the left foot. Patient is afebrile however leukocytosis was present on arrival.  All digits of the right foot appear necrotic, foul-smelling and gangrenous which extends to the metatarsal level. The left foot has an open ulcer on the dorsal aspect near the second digit however does not appear to be necrotic appearing as the right foot. CTA of the abdominal aorta with bilateral runoff shows an area of significant narrowing in the distal right superficial femoral artery with what appears to be relatively normal flow in the left LE circulation. Podiatry consulted and performing daily wound care/Betadine debridement. MRI 1/13/2022 reveals what appears to be osteomyelitis in the first digit and metatarsal and soft tissue gas as noted by radiologist.  Patient underwent RLE arteriogram with angioplasty on 1/17/2022 by IR. · As per podiatry, plan for eventual TMA as outpatient. No indication for immediate amputation at this time. · Continue Augmentin. No further indication for pseudomonal coverage given length of therapy with Zosyn. Augmentin will continue for another 7 days for full course of treatment for deep skin infections. 2. 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 Iron studies done suggest inflammatory anemia. Benign reticulocyte count, however patient has been having a drop in his chronic macrocytic hemoglobin compared to his usual baseline.   We will continue to monitor/trend, unclear if patient will follow up with specialist as outpatient but will make suggestion if need be.  3. History of EtOH Abuse: Patient no longer agitated as seen in alcohol abuse. Sitter at bedside. 4. Diffuse Alcoholic Fatty Liver Disease without Cirrhosis: Evidence for acute EtOH toxic insult due to mild transaminitis with AST > ALT. Both now downtrending, indicating recovery from EtOH toxic insult. INR 1.56. Platelet count greater than 150,000. Ultrasound of the liver shows fatty infiltration with concurrent hypoechogenicity, normal color flow via Doppler studies. No CBD dilatation. Patient at risk for developing fibrotic changes with continued drinking and eventual cirrhosis. 5. Severe Hypoalbuminemia: Due to severe malnutrition and chronic EtOH use. · IV albumin x3 doses given on 1/12-1/13/2022   · Dietitian following  6. Moderate Acute on Chronic Asymptomatic Hypovolemic Hyponatremia: Plateaued now, stable nearing baseline. Current still Na 131. Urine Na > 20, Urine osm > 100. · Continue with IV NS.  7. Mild Rhabdomyolysis: Resolved. 8. Disposition: Patient ok for discharge per podiatry. However patient requested assisted living, which won't be approved due to insurance, and wishes to now go to a nursing home. SW currently investigating options for ECF. No change in plan. Awaiting placement. Subjective (Past 24 hour events):  No acute events overnight. Sitter at bedside. Patient quiet as usual, no new complaints or changes in ROS. States he is fine. HPI:  61-year-old homeless male with PMH of EtOH and cocaine abuse who came to the ED on 1/10/2022 due to bilateral lower extremity pain and numbness. On arrival patient was noted to be an extremely poor historian. Patient did state that he has had lower extremity numbness and pain for about 2 weeks. Not sure how long he has had ulcers in the foot. Admits to drinking every day, about 6 drinks per day for several years. Denied any chronic medical problems.   Physical exam in the ED showed bilateral lower extremity eschars along with ulcers and discolored toes. Admission labs showed multiple significant abnormalities which included hyponatremia, hypokalemia, hyperbilirubinemia leukocytosis, macrocytic anemia, rhabdomyolysis. Patient admitted for these bilateral ulcers/eschar and multiple lab abnormalities. Please see Assessment and Plan for further details on hospital course. Scheduled Meds:   amoxicillin-clavulanate  1 tablet Oral 2 times per day    clopidogrel  75 mg Oral Daily    nitroglycerin  0.5 inch Topical Daily    nicotine  1 patch TransDERmal Daily    phosphorus replacement protocol   Other RX Placeholder    calcium replacement protocol   Other RX Placeholder    sodium chloride flush  5-40 mL IntraVENous 2 times per day    enoxaparin  40 mg SubCUTAneous Q24H    thiamine  100 mg Oral Daily    multivitamin  1 tablet Oral Daily     Continuous Infusions:   sodium chloride 75 mL/hr at 01/22/22 0445    sodium chloride         PHYSICAL EXAMINATION:  Blood pressure (!) 153/73, pulse 80, temperature 97.7 °F (36.5 °C), temperature source Oral, resp. rate 18, height 6' (1.829 m), weight 140 lb (63.5 kg), SpO2 97 %. Oxygen Delivery -    General: Acute on chronic ill appearing. Appears disheveled. In no acute distress. HEENT:  normocephalic and atraumatic. No scleral icterus. PERR  Neck: supple. No Thyromegaly. Lungs: clear to auscultation. No retractions  Cardiac: RRR. No JVD. Abdomen: soft. Nontender. Extremities: Open ulceration on the dorsum of the left foot. Right foot from metatarsals to toes are covered in Ace wraps soaked in Betadine. See below for appearance of feet prior to bandaging/wound care. Vasculature: delayed capillary refill. Skin: cool and dry in the LE b/l  Psych:  Alert and oriented x3. Lymph:  No supraclavicular adenopathy. Neurologic:  No focal deficit.  No seizures                        Diagnostic Data: (All radiographs, EKGs, PFTs, and imaging are personally viewed and interpreted unless otherwise noted). CBC:   Recent Labs     01/20/22  0632 01/21/22  0651 01/22/22  0426   WBC 2.0* 3.0* 4.1*   HGB 9.4* 10.3* 10.1*    205 227     BMP:    Recent Labs     01/20/22  0632 01/20/22  0632 01/20/22  1114 01/21/22  0651 01/22/22  0426   *  --   --  135 135   K 2.9*   < > 3.2* 3.5 3.0*     --   --  105 104   CO2 20*  --   --  21* 21*   BUN 4*  --   --  5* 4*   CREATININE 0.5  --   --  0.5 0.4   GLUCOSE 94  --   --  108 88    < > = values in this interval not displayed. Ionized Calcium: No results for input(s): IONCA in the last 72 hours. Magnesium:   Recent Labs     01/20/22  1114   MG 1.4*     Phosphorus:   No results for input(s): PHOS in the last 72 hours. Hepatic:   No results for input(s): AST, ALT, ALB, BILITOT, ALKPHOS in the last 72 hours.   Vitamin B12: No components found for: B12  Folate:   Lab Results   Component Value Date    FOLATE 9.8 01/10/2022     IRON:   Lab Results   Component Value Date    IRON 21 01/17/2022     Iron Saturation: No components found for: PERCENTFE  TIBC:   Lab Results   Component Value Date    TIBC 117 01/17/2022     Ferritin:   Lab Results   Component Value Date    FERRITIN 806 01/17/2022     Troponin:   Lab Results   Component Value Date    TROPONINT < 0.010 07/06/2021    TROPONINT < 0.010 09/01/2014     BNP: No results found for: BNP  Lipids: No results found for: LDL  INR:   Lab Results   Component Value Date    INR 1.56 01/12/2022     ABG: No results found for: PH, PCO2, PO2, HCO3, O2SAT  TSH:   Lab Results   Component Value Date    TSH 1.650 06/27/2017     Sed Rate: No results found for: SEDRATE  CRP:   Lab Results   Component Value Date    CRP 12.22 01/10/2022     UA:   No results for input(s): Avelino Marco Antonio, COLORU, CLARITYU, MUCUS, PROTEINU, BLOODU, RBCUA, WBCUA, BACTERIA, NITRU, GLUCOSEU, BILIRUBINUR, UROBILINOGEN, KETUA, LABCAST, LABCASTTY, AMORPHOS in the last 72 hours. Invalid input(s): CRYSTALS  Micro:   Lab Results   Component Value Date    BC No growth-preliminary No growth  01/10/2022     Imaging Quick Reads from Previous 7 Days:  XR FOOT LEFT (MIN 3 VIEWS)    Result Date: 1/10/2022  PROCEDURE: XR FOOT LEFT (MIN 3 VIEWS) CLINICAL INFORMATION: frostbite. COMPARISON: Left foot radiographs dated 30 January 2020. TECHNIQUE: 4 views of the left foot. FINDINGS:  There is abnormal density involving the first metatarsophalangeal joint, unchanged since remote radiographs dated 30 January 2020. There is degenerative change. There is no fracture. There is no definite bony destruction noted. There is no radiographic evidence for osteomyelitis. The calcaneus is normal. The soft tissues are normal.      1. Stable left foot radiographs, no interval change since previous study dated 30 January 2020. 2. Abnormal density involving the first metatarsophalangeal joint, unchanged. 3. Degenerative change. 4. No definite evidence of fracture, bony destruction or osteomyelitis. . **This report has been created using voice recognition software. It may contain minor errors which are inherent in voice recognition technology. ** Final report electronically signed by DR Silvina Alfredo on 1/10/2022 2:00 PM    XR FOOT RIGHT (MIN 3 VIEWS)    Result Date: 1/10/2022  PROCEDURE: XR FOOT RIGHT (MIN 3 VIEWS) CLINICAL INFORMATION: frostbite. COMPARISON: No prior study. TECHNIQUE: 4 views of the right foot. FINDINGS:  There are postoperative changes with an intramedullary jaleesa and screw in the distal tibia. There is abnormal density involving the neck of the fifth metacarpal. There are degenerative changes. There is diffuse osteopenia. There is soft tissue swelling over the dorsum of the foot. There are small calcifications adjacent to the calcaneocuboid articulation. The remaining bony structures are intact. . The soft tissues are normal.      1. Postoperative changes in the distal tibia.  2. Abnormal density involving the neck of the fifth metacarpal. 3. Degenerative changes. 4. Diffuse osteopenia. 5. Soft tissue swelling over the dorsum of foot. . **This report has been created using voice recognition software. It may contain minor errors which are inherent in voice recognition technology. ** Final report electronically signed by DR Wes Jurado on 1/10/2022 2:03 PM    IR ANGIOGRAM EXTREMITY RIGHT    Result Date: 1/17/2022  PROCEDURE: AORTOFEMORAL ANGIOGRAM/with runoff procedure/angioplasty: CLINICAL INFORMATION: 81-year-old male with right lower extremity pain and balloons. Abnormal findings and previous right lower extremity vascular ultrasound. PERFORMED BY:  Dr. Magen Aviles. John Sprague MD APPROACH: Left common femoral artery, micropuncture technique. CATHETERS: 5 Western Silva VCF, 5 Greenlandic angled glide catheter, 4 mm x 25 cm inpact admiral balloon STENTS: None. VESSELS CATHETERIZED: Upper abdominal aorta, lower abdominal aorta, right and left external iliac arteries. Raghu Richter DIAGNOSTIC PROCEDURES: Abdominal aortogram, pelvic angiography, right leg arteriogram. INTERVENTIONS: Recanalization of the right superficial femoral artery. Angioplasty of multiple segments stenosis throughout the right superficial femoral and popliteal arteries. FLUOROSCOPY TIME: 19 minutes 7 seconds. FLUOROSCOPIC IMAGES: 109 SEDATION: Versed 2mg ; fentanyl 200mcg , IV; the patient was sedated for 90 minutes during this procedure and monitored with EKG and pulse ox monitoring devices by registered nurse. Face-to-face time with the patient was 90 minutes. OTHER MEDICATIONS: Heparin, 4000 units, IV. Patient was started on Plavix regimen, 75 mg, by mouth, daily, X. 45 days, starting with a 300 mg loading dose today. CONTRAST: 195 ml, Isovue-300. CLOSURE: Angio-Seal device, successful without complication. TECHNIQUE: Signed informed consent was obtained prior to performing this procedure. The patient was sedated, as indicated above.  Access was obtained and a 5 Western Silva vascular sheath was inserted. The procedures as above were then performed. FINDINGS: AORTA: The aorta is unremarkable. The renal arteries are widely patent. PELVIC VESSELS: Both common, internal, and external iliac arteries are widely patent. Angiogram right leg: CFA AND PROFUNDA: The common and deep femoral arteries are widely patent. SFA AND POPLITEAL ARTERY: There is complete occlusion of the mid right SFA. There are extensive collateral vessels which reconstitute the distal SFA/popliteal artery. There are innumerable additional areas of multifocal stenosis throughout the superficial femoral artery as well as the popliteal artery. TRIFURCATION VESSELS: All 3 trifurcation vessels are widely patent. At this point it was then decided to perform an intervention. A exchange length 0.035 inch stiff Glidewire was advanced over the aortic bifurcation into the superficial femoral artery. The existing 5 Western Silva vascular sheath was removed and a long 6 Western Silva  ANL sheath was advanced. The wire in conjunction with a glide catheter was successfully advanced through the stenotic portions of the right SFA and popliteal artery. Angioplasty was subsequently performed throughout the right leg. Post angioplasty images demonstrate significantly improved flow through the right lower extremity. The patient tolerated the procedure well with no immediate postprocedural complication. An Angio-Seal device was used for hemostasis at the left groin with no complication. Complete occlusion of the mid right SFA as well as multifocal stenosis throughout the SFA and popliteal artery, successfully treated with angioplasty with an excellent result. **This report has been created using voice recognition software. It may contain minor errors which are inherent in voice recognition technology. ** Final report electronically signed by Dr Bae on 1/17/2022 6:36 PM    CTA ABDOMINAL AORTA W BILAT RUNOFF W WO CONTRAST    Result Date: 1/10/2022  PROCEDURE: CTA ABDOMINAL AORTA W BILAT RUNOFF W WO CONTRAST CLINICAL INFORMATION: LE wound, eschar, low pulsus, PAD . COMPARISON: No prior study. TECHNIQUE: A noncontrast localizer was obtained. 3 mm axial images were obtained through the abdomen, pelvis and both lower extremities after the administration of intravenous contrast.  3D reconstructions were performed on a dedicated 3-D workstation to  include sagittal and coronal mid images through the vasculature. Centerline reconstructions were also obtained. All CT scans at this facility use dose modulation, iterative reconstruction, and/or weight-based dosing when appropriate to reduce radiation dose to as low as reasonably achievable. FINDINGS: CTA abdominal aorta and runoff : There is no abdominal aortic aneurysm. There is no dissection. The aorta is of normal caliber. The visualized aspects of the lung bases are clear. There is mild diffuse fatty replacement of the liver. . There are punctate calcifications in the spleen. There are small bilateral adrenal nodules. There is an atrophic pancreas disc dilatation of the pancreatic duct. There is a 2.2 cm fluid collection in  the region of the splenic hilum. The gallbladder is normal.   There is no hydronephrosis or stones of either kidney. No contour deforming renal masses are noted. . No abnormalities of the small bowel loops are noted. The IVC and aorta are of normal caliber. There is no adenopathy. The bladder is normal. The prostate gland measures 4.2 x 3.6 cm in size There is no colonic inflammation. There is relatively normal flow in the abdominal aorta to right and single left renal arteries and superior mesenteric and celiac axis arteries, right and left common, internal and external iliac arteries right and left common and proximal superficial femoral arteries. There appears be an area of significant narrowing in the distal right superficial femoral artery.  Evaluation of the right leg is limited by motion artifact. There is flow in the left common and superficial femoral, popliteal, anterior and posterior tibial arteries. There is an intramedullary jaleesa in the right tibia. 1. Limited evaluation of the right leg secondary to patient motion artifact. 2. Relatively normal flow in the abdominal aorta, visceral arteries, right and left common, internal and external iliac arteries common femoral arteries. 3. There is an area of significant narrowing in the distal right superficial femoral artery. 4. There is relatively normal flow in the left common and superficial femoral, popliteal, anterior and posterior tibial arteries. 5. Intramedullary jaleesa in the right tibia-fibula. 6. Mild diffuse fatty replacement in the liver. 7. Punctate calcifications in spleen. 8. Small bilateral adrenal nodules. 9. Atrophic pancreas. Dilated pancreatic duct. 10. 2.2 cm fluid collection in the region of the splenic hilum. 11. Enlarged prostate gland. **This report has been created using voice recognition software. It may contain minor errors which are inherent in voice recognition technology. ** Final report electronically signed by DR Keiko Le on 1/10/2022 9:10 PM    US LIVER    Result Date: 1/13/2022  PROCEDURE: US LIVER, US DUP ABD PEL RETRO SCROT COMPLETE CLINICAL INFORMATION: Alcoholic cirrhosis. COMPARISON: CTA of the abdominal aorta dated 10 January 2022. TECHNIQUE: Multiplanar sonographic images were obtained of the liver. FINDINGS:  Liver - L= 20.7 cm Gallbladder - 9.4 x 2.8 x 2.7 cm Gallbladder Wall - 1.2 cm Common Duct - 0.4 cm Lopez's Sign: neg There is increased echogenicity in the liver. There are no liver masses. There is no intrahepatic biliary ductal dilatation. There is ascites adjacent to the left lobe of the liver. There is normal color flow and spectral analysis in the main portal vein, right and left portal veins, hepatic artery, inferior vena cava and all 3 hepatic veins.  The pancreas was not adequately visualized. . There are small gallstones. There is a thickened gallbladder wall and pericholecystic fluid. . . The common bile duct is normal and measures 4 mm. There is fluid in the hepatorenal fossa. There is an area of abnormal echogenicity anterior to the inferior vena cava posterior to the liver. These findings may represent abnormal hepatic flexure and ascending colon possibly secondary to colitis. Please correlate clinically. 1. Increased echogenicity in the liver consistent with fatty infiltration. 2. There are gallstones, gallbladder wall thickening and pericholecystic fluid. 3. There is ascites adjacent to the left lobe of liver and fluid in the hepatorenal fossa. 4. Small area of abnormal echogenicity anterior to the inferior vena cava posterior to the liver. In correlation with previous CT angiogram, these findings may represent an abnormal hepatic flexure and ascending colon possibly related to colitis. Please correlate clinically. . **This report has been created using voice recognition software. It may contain minor errors which are inherent in voice recognition technology. ** Final report electronically signed by DR Davy Montana on 1/13/2022 1:29 PM    MRI FOOT LEFT WO CONTRAST    Result Date: 1/13/2022  PROCEDURE: MRI FOOT LEFT WO CONTRAST CLINICAL INFORMATION: Ulcer to dorsal left foot between metatarsals 1 and 2; ulcer probes to periosteum; rule out osteomyelitis COMPARISON: Left foot radiographs 1/10/2022 TECHNIQUE: Routine MRI of the left foot without contrast. FINDINGS: POSTOPERATIVE CHANGES: None. ALIGNMENT: Anatomic. MARROW SIGNAL/MARROW EDEMA/FRACTURE: 1. Fatty marrow signal is seen. 2. There is mild increase signal on STIR within the first metatarsal without a corresponding abnormality on T1 weighted imaging. Findings likely relate to reactive edema.  3. There is a linear hyperintensity at the base of the proximal phalanx of the fifth digit without corresponding T1 hypointensity that would suggest a stress reaction or reactive bone marrow edema. PLAFOND/TALAR DOME/ANKLE MORTISE: 1. Only evaluated on the sagittal view. 2. There is no osteochondral fracture. 3. The ankle mortise in intact. 4. Mild degenerative changes of the ankle joint. JOINTS: There is hallux valgus deformity of the first digit. Severe degenerative changes at the first metatarsophalangeal joint. Mild degenerative changes seen in the remainder of the left foot. TENDONS: Limited evaluation. Grossly intact. SINUS TARSI/TARSAL TUNNEL: Unremarkable. MUSCULATURE: No significant atrophy JOINT FLUID: 1. Physiologic. SOFT TISSUES: Susceptibility artifact seen in the region of the proximal phalanx of the first digit likely relates to the presence of a soft tissue gas. The soft tissue ulcer appears to approximate the proximal phalanx of the first digit. Mild thickening  of the proximal plantar fascia. 1. The soft tissue ulcer appears to approximate the proximal phalanx of the first digit. Soft tissue gas is noted. Osteomyelitis cannot be excluded. 2. Increased bone marrow signal on STIR without corresponding T1 hypointensity in the first metatarsal is a nonspecific findings. Likely relates to reactive edema at this time. The finding is equivocal for osteomyelitis. 3. Linear hyperintensity and STIR in the base of the proximal phalanx of the fifth digit without corresponding T1 hypointensity likely relates to reactive bone marrow edema versus a stress reaction. Clinical correlation is recommended. 4. Marked degenerative changes of the first metatarsophalangeal joint. Hallux valgus deformity. **This report has been created using voice recognition software. It may contain minor errors which are inherent in voice recognition technology. ** Final report electronically signed by Dr Lia Hurd on 1/13/2022 11:52 AM    VL DUP LOWER EXTREMITY ARTERIES BILATERAL    Result Date: 1/14/2022  PROCEDURE: VL DUP LOWER EXTREMITY ARTERIES BILATERAL CLINICAL INFORMATION: Assess blood flow for healing potential; possible surgical intervention in the near future (right foot TMA), Patient has frostbite to right distal forefoot; patient has full-thickness ulceration to dorsal left forefoot COMPARISON: No comparison study available. TECHNIQUE: Multiple permanent grayscale and color flow sonographic images of the major arteries of both lower extremities were obtained from the level of the groin to the level of the ankle . Spectral Doppler waveforms were obtained and velocity measurements were measured. FINDINGS: RIGHT ARTERY (PSV cm/sec) CFA ---------------> 127 PSV cm/sec PROF -------------> 244 PSV cm/sec SFA PROX -------> 53 PSV cm/sec SFA MID ----------> 0 PSV cm/sec SFA DIST --------> 24 PSV cm/sec POP A PROX ---> 63 PSV cm/sec POP A DIST ----> 56 PSV cm/sec PTA ---------------> 57 PSV cm/sec PERONEAL -----> 17 PSV cm/sec KAREN ----------------> 30 PSV cm/sec LEFT ARTERY (PSV cm/sec) CFA ---------------> 172 PSV cm/sec PROF -------------> 84 PSV cm/sec SFA PROX -------> 84 PSV cm/sec SFA MID ----------> 124 PSV cm/sec SFA DIST --------> 95 PSV cm/sec POP A PROX ---> 121 PSV cm/sec POP A DIST ----> 152 PSV cm/sec PTA ---------------> 68 PSV cm/sec PERONEAL -----> 41 PSV cm/sec KAREN ----------------> 77 PSV cm/sec ROXANE RIGHT KAREN----->0.53 PTA----->0.59 TBI------>0 ROXANE LEFT KAREN----->0.92 PTA----->0.95 TBI------>0.72 VELOCITY MEASUREMENTS: Moderately diminished ABIs right side. Normal ABIs left side. RIGHT LEG: Excellent pulsatility in the common femoral artery. Significant step-off in velocity in the profunda, suggesting a mild stenosis. Moderately dampened pulsatility is seen in the proximal SFA with occlusion of the mid SFA and collateral reconstitution of the distal SFA with severely dampened monophasic flow. Relatively good flow is seen in the popliteal artery, however, as well as in the posterior tibial artery.  There is moderately dampened pulsatility in the anterior tibial artery with  severely dampened pulsatility in the peroneal artery. LEFT LEG: Excellent pulsatility in the common femoral artery. Good pulsatility in the profunda. Good pulsatility is seen in the SFA diffusely as well as in the popliteal artery and all 3 trifurcation vessels. 1. Normal findings in the left leg. 2. Abnormal findings in the right leg,. Findings suggestive of mild stenosis at the origin of the profunda and possibly at the origin right SFA. Total occlusion mid right SFA with distal collateral reconstitution. Also evidence for trifurcation disease right side. 3. Aortofemoral angiography is recommended for further evaluation with possible intervention. If desired, this can be arranged through the interventional scheduling desk of [facility]'s radiology department ([phone]). **This report has been created using voice recognition software. It may contain minor errors which are inherent in voice recognition technology. ** Final report electronically signed by Dr. Debby Cook on 1/14/2022 11:14 AM    US DUP ABD PEL RETRO SCROT COMPLETE    Result Date: 1/13/2022  PROCEDURE: US LIVER, US DUP ABD PEL RETRO SCROT COMPLETE CLINICAL INFORMATION: Alcoholic cirrhosis. COMPARISON: CTA of the abdominal aorta dated 10 January 2022. TECHNIQUE: Multiplanar sonographic images were obtained of the liver. FINDINGS:  Liver - L= 20.7 cm Gallbladder - 9.4 x 2.8 x 2.7 cm Gallbladder Wall - 1.2 cm Common Duct - 0.4 cm Lopez's Sign: neg There is increased echogenicity in the liver. There are no liver masses. There is no intrahepatic biliary ductal dilatation. There is ascites adjacent to the left lobe of the liver. There is normal color flow and spectral analysis in the main portal vein, right and left portal veins, hepatic artery, inferior vena cava and all 3 hepatic veins. The pancreas was not adequately visualized. . There are small gallstones.  There is a thickened gallbladder wall and pericholecystic fluid. . . The common bile duct is normal and measures 4 mm. There is fluid in the hepatorenal fossa. There is an area of abnormal echogenicity anterior to the inferior vena cava posterior to the liver. These findings may represent abnormal hepatic flexure and ascending colon possibly secondary to colitis. Please correlate clinically. 1. Increased echogenicity in the liver consistent with fatty infiltration. 2. There are gallstones, gallbladder wall thickening and pericholecystic fluid. 3. There is ascites adjacent to the left lobe of liver and fluid in the hepatorenal fossa. 4. Small area of abnormal echogenicity anterior to the inferior vena cava posterior to the liver. In correlation with previous CT angiogram, these findings may represent an abnormal hepatic flexure and ascending colon possibly related to colitis. Please correlate clinically. . **This report has been created using voice recognition software. It may contain minor errors which are inherent in voice recognition technology. ** Final report electronically signed by DR Ann Adair on 1/13/2022 1:29 PM      Zoraida Presley MD

## 2022-01-23 LAB
ANION GAP SERPL CALCULATED.3IONS-SCNC: 13 MEQ/L (ref 8–16)
BASOPHILS # BLD: 0.8 %
BASOPHILS ABSOLUTE: 0 THOU/MM3 (ref 0–0.1)
BUN BLDV-MCNC: 3 MG/DL (ref 7–22)
CALCIUM SERPL-MCNC: 8.4 MG/DL (ref 8.5–10.5)
CHLORIDE BLD-SCNC: 107 MEQ/L (ref 98–111)
CO2: 19 MEQ/L (ref 23–33)
CREAT SERPL-MCNC: 0.4 MG/DL (ref 0.4–1.2)
EOSINOPHIL # BLD: 2.1 %
EOSINOPHILS ABSOLUTE: 0.1 THOU/MM3 (ref 0–0.4)
ERYTHROCYTE [DISTWIDTH] IN BLOOD BY AUTOMATED COUNT: 14.7 % (ref 11.5–14.5)
ERYTHROCYTE [DISTWIDTH] IN BLOOD BY AUTOMATED COUNT: 55.9 FL (ref 35–45)
GFR SERPL CREATININE-BSD FRML MDRD: > 90 ML/MIN/1.73M2
GLUCOSE BLD-MCNC: 107 MG/DL (ref 70–108)
HCT VFR BLD CALC: 29.6 % (ref 42–52)
HEMOGLOBIN: 9.9 GM/DL (ref 14–18)
IMMATURE GRANS (ABS): 0.02 THOU/MM3 (ref 0–0.07)
IMMATURE GRANULOCYTES: 0.4 %
LYMPHOCYTES # BLD: 40 %
LYMPHOCYTES ABSOLUTE: 1.9 THOU/MM3 (ref 1–4.8)
MCH RBC QN AUTO: 34.4 PG (ref 26–33)
MCHC RBC AUTO-ENTMCNC: 33.4 GM/DL (ref 32.2–35.5)
MCV RBC AUTO: 102.8 FL (ref 80–94)
MONOCYTES # BLD: 15 %
MONOCYTES ABSOLUTE: 0.7 THOU/MM3 (ref 0.4–1.3)
NUCLEATED RED BLOOD CELLS: 0 /100 WBC
PLATELET # BLD: 270 THOU/MM3 (ref 130–400)
PMV BLD AUTO: 10.6 FL (ref 9.4–12.4)
POTASSIUM SERPL-SCNC: 3.5 MEQ/L (ref 3.5–5.2)
RBC # BLD: 2.88 MILL/MM3 (ref 4.7–6.1)
SEG NEUTROPHILS: 41.7 %
SEGMENTED NEUTROPHILS ABSOLUTE COUNT: 2 THOU/MM3 (ref 1.8–7.7)
SODIUM BLD-SCNC: 139 MEQ/L (ref 135–145)
WBC # BLD: 4.8 THOU/MM3 (ref 4.8–10.8)

## 2022-01-23 PROCEDURE — 6370000000 HC RX 637 (ALT 250 FOR IP): Performed by: STUDENT IN AN ORGANIZED HEALTH CARE EDUCATION/TRAINING PROGRAM

## 2022-01-23 PROCEDURE — 85025 COMPLETE CBC W/AUTO DIFF WBC: CPT

## 2022-01-23 PROCEDURE — 80048 BASIC METABOLIC PNL TOTAL CA: CPT

## 2022-01-23 PROCEDURE — 36415 COLL VENOUS BLD VENIPUNCTURE: CPT

## 2022-01-23 PROCEDURE — 2580000003 HC RX 258: Performed by: HOSPITALIST

## 2022-01-23 PROCEDURE — 2500000003 HC RX 250 WO HCPCS: Performed by: STUDENT IN AN ORGANIZED HEALTH CARE EDUCATION/TRAINING PROGRAM

## 2022-01-23 PROCEDURE — 1200000000 HC SEMI PRIVATE

## 2022-01-23 PROCEDURE — 6370000000 HC RX 637 (ALT 250 FOR IP): Performed by: PHYSICIAN ASSISTANT

## 2022-01-23 PROCEDURE — 99232 SBSQ HOSP IP/OBS MODERATE 35: CPT | Performed by: NURSE PRACTITIONER

## 2022-01-23 PROCEDURE — 6370000000 HC RX 637 (ALT 250 FOR IP): Performed by: RADIOLOGY

## 2022-01-23 PROCEDURE — 6370000000 HC RX 637 (ALT 250 FOR IP): Performed by: INTERNAL MEDICINE

## 2022-01-23 RX ADMIN — METOPROLOL TARTRATE 5 MG: 5 INJECTION INTRAVENOUS at 20:46

## 2022-01-23 RX ADMIN — AMOXICILLIN AND CLAVULANATE POTASSIUM 1 TABLET: 875; 125 TABLET, FILM COATED ORAL at 20:46

## 2022-01-23 RX ADMIN — AMOXICILLIN AND CLAVULANATE POTASSIUM 1 TABLET: 875; 125 TABLET, FILM COATED ORAL at 08:19

## 2022-01-23 RX ADMIN — HYDROCODONE BITARTRATE AND ACETAMINOPHEN 1 TABLET: 5; 325 TABLET ORAL at 03:34

## 2022-01-23 RX ADMIN — HYDROCODONE BITARTRATE AND ACETAMINOPHEN 1 TABLET: 5; 325 TABLET ORAL at 15:39

## 2022-01-23 RX ADMIN — HYDROCODONE BITARTRATE AND ACETAMINOPHEN 1 TABLET: 5; 325 TABLET ORAL at 09:31

## 2022-01-23 RX ADMIN — HYDROXYZINE HYDROCHLORIDE 25 MG: 25 TABLET ORAL at 20:46

## 2022-01-23 RX ADMIN — CLOPIDOGREL BISULFATE 75 MG: 75 TABLET, FILM COATED ORAL at 08:19

## 2022-01-23 RX ADMIN — Medication 1 TABLET: at 08:20

## 2022-01-23 RX ADMIN — SODIUM CHLORIDE: 9 INJECTION, SOLUTION INTRAVENOUS at 03:37

## 2022-01-23 RX ADMIN — Medication 100 MG: at 08:20

## 2022-01-23 RX ADMIN — Medication 6 MG: at 20:46

## 2022-01-23 ASSESSMENT — PAIN - FUNCTIONAL ASSESSMENT
PAIN_FUNCTIONAL_ASSESSMENT: PREVENTS OR INTERFERES SOME ACTIVE ACTIVITIES AND ADLS
PAIN_FUNCTIONAL_ASSESSMENT: 0-10

## 2022-01-23 ASSESSMENT — PAIN SCALES - GENERAL
PAINLEVEL_OUTOF10: 10

## 2022-01-23 ASSESSMENT — PAIN DESCRIPTION - DESCRIPTORS: DESCRIPTORS: ACHING

## 2022-01-23 NOTE — PROGRESS NOTES
HOSPITALIST PROGRESS NOTE    Patient:  Rianna Andrea    Unit/Bed:7K-27/027-A  YOB: 1960  MRN: [id number]   PCP: No primary care provider on file. Date of Admission: 1/10/2022  Date of Service: 1/23/2022  Chief Complaint:- Foot pain    Assessment and Plan:  1. Bilateral LE Ulcers/Eschar/?Frostbite on RLE: Patient presents with what appears to be frostbite of the right foot as well as a full-thickness ulceration of the left foot. Patient is afebrile however leukocytosis was present on arrival.  All digits of the right foot appear necrotic, foul-smelling and gangrenous which extends to the metatarsal level. The left foot has an open ulcer on the dorsal aspect near the second digit however does not appear to be necrotic appearing as the right foot. CTA of the abdominal aorta with bilateral runoff shows an area of significant narrowing in the distal right superficial femoral artery with what appears to be relatively normal flow in the left LE circulation. Podiatry consulted and performing daily wound care/Betadine debridement. MRI 1/13/2022 reveals what appears to be osteomyelitis in the first digit and metatarsal and soft tissue gas as noted by radiologist.  Patient underwent RLE arteriogram with angioplasty on 1/17/2022 by IR. · As per podiatry, plan for eventual TMA as outpatient. No indication for immediate amputation at this time. · Continue Augmentin for 7 days. No further indication for pseudomonal coverage given length of therapy with Zosyn. 2. 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 Iron studies done suggest inflammatory anemia. Benign reticulocyte count, however patient has been having a drop in his chronic macrocytic hemoglobin compared to his usual baseline. Leukopenia has resolved. Hbg is stable at 9.9.  3. History of EtOH Abuse: Patient no longer agitated as seen in alcohol abuse.     4. Diffuse Alcoholic Fatty nitroglycerin  0.5 inch Topical Daily    nicotine  1 patch TransDERmal Daily    phosphorus replacement protocol   Other RX Placeholder    calcium replacement protocol   Other RX Placeholder    sodium chloride flush  5-40 mL IntraVENous 2 times per day    enoxaparin  40 mg SubCUTAneous Q24H    thiamine  100 mg Oral Daily    multivitamin  1 tablet Oral Daily     Continuous Infusions:   sodium chloride 75 mL/hr at 01/23/22 0545    sodium chloride         PHYSICAL EXAMINATION:  Blood pressure (!) 154/78, pulse 81, temperature 97.9 °F (36.6 °C), temperature source Oral, resp. rate 18, height 6' (1.829 m), weight 140 lb (63.5 kg), SpO2 98 %. Oxygen Delivery -    General: Acute on chronic ill appearing. Appears disheveled. In no acute distress. HEENT:  normocephalic and atraumatic. No scleral icterus. PERR  Neck: supple. No Thyromegaly. Lungs: clear to auscultation. No retractions  Cardiac: RRR. No JVD. Abdomen: soft. Nontender. Extremities: Open ulceration on the dorsum of the left foot. Right foot from metatarsals to toes are covered in Ace wraps soaked in Betadine. See below for appearance of feet prior to bandaging/wound care. Vasculature: delayed capillary refill. Skin: cool and dry in the LE b/l  Psych:  Alert and oriented x3. Lymph:  No supraclavicular adenopathy. Neurologic:  No focal deficit. No seizures                        Diagnostic Data: (All radiographs, EKGs, PFTs, and imaging are personally viewed and interpreted unless otherwise noted).    CBC:   Recent Labs     01/21/22  0651 01/22/22  0426 01/23/22  0547   WBC 3.0* 4.1* 4.8   HGB 10.3* 10.1* 9.9*    227 270     BMP:    Recent Labs     01/21/22  0651 01/21/22  0651 01/22/22  0426 01/22/22  1922 01/23/22  0547     --  135  --  139   K 3.5   < > 3.0* 3.6 3.5     --  104  --  107   CO2 21*  --  21*  --  19*   BUN 5*  --  4*  --  3*   CREATININE 0.5  --  0.4  --  0.4   GLUCOSE 108  --  88  --  107    < > = values in this interval not displayed. Ionized Calcium: No results for input(s): IONCA in the last 72 hours. Magnesium:   No results for input(s): MG in the last 72 hours. Phosphorus:   No results for input(s): PHOS in the last 72 hours. Hepatic:   No results for input(s): AST, ALT, ALB, BILITOT, ALKPHOS in the last 72 hours. Vitamin B12: No components found for: B12  Folate:   Lab Results   Component Value Date    FOLATE 9.8 01/10/2022     IRON:   Lab Results   Component Value Date    IRON 21 01/17/2022     Iron Saturation: No components found for: PERCENTFE  TIBC:   Lab Results   Component Value Date    TIBC 117 01/17/2022     Ferritin:   Lab Results   Component Value Date    FERRITIN 806 01/17/2022     Troponin:   Lab Results   Component Value Date    TROPONINT < 0.010 07/06/2021    TROPONINT < 0.010 09/01/2014     BNP: No results found for: BNP  Lipids: No results found for: LDL  INR:   Lab Results   Component Value Date    INR 1.56 01/12/2022     ABG: No results found for: PH, PCO2, PO2, HCO3, O2SAT  TSH:   Lab Results   Component Value Date    TSH 1.650 06/27/2017     Sed Rate: No results found for: SEDRATE  CRP:   Lab Results   Component Value Date    CRP 12.22 01/10/2022     UA:   No results for input(s): Avelino Marco Antonio, COLORU, CLARITYU, MUCUS, PROTEINU, BLOODU, RBCUA, [address], BACTERIA, NITRU, GLUCOSEU, BILIRUBINUR, UROBILINOGEN, KETUA, LABCAST, LABCASTTY, AMORPHOS in the last 72 hours. Invalid input(s): CRYSTALS  Micro:   Lab Results   Component Value Date    BC No growth-preliminary No growth  01/10/2022     Imaging Quick Reads from Previous 7 Days:  XR FOOT LEFT (MIN 3 VIEWS)    Result Date: 1/10/2022  PROCEDURE: XR FOOT LEFT (MIN 3 VIEWS) CLINICAL INFORMATION: frostbite. COMPARISON: Left foot radiographs dated 30 January 2020. TECHNIQUE: 4 views of the left foot.  FINDINGS:  There is abnormal density involving the first metatarsophalangeal joint, unchanged since remote radiographs dated 30 January 2020. There is degenerative change. There is no fracture. There is no definite bony destruction noted. There is no radiographic evidence for osteomyelitis. The calcaneus is normal. The soft tissues are normal.      1. Stable left foot radiographs, no interval change since previous study dated 30 January 2020. 2. Abnormal density involving the first metatarsophalangeal joint, unchanged. 3. Degenerative change. 4. No definite evidence of fracture, bony destruction or osteomyelitis. . **This report has been created using voice recognition software. It may contain minor errors which are inherent in voice recognition technology. ** Final report electronically signed by DR Sherren Crosser on 1/10/2022 2:00 PM    XR FOOT RIGHT (MIN 3 VIEWS)    Result Date: 1/10/2022  PROCEDURE: XR FOOT RIGHT (MIN 3 VIEWS) CLINICAL INFORMATION: frostbite. COMPARISON: No prior study. TECHNIQUE: 4 views of the right foot. FINDINGS:  There are postoperative changes with an intramedullary jaleesa and screw in the distal tibia. There is abnormal density involving the neck of the fifth metacarpal. There are degenerative changes. There is diffuse osteopenia. There is soft tissue swelling over the dorsum of the foot. There are small calcifications adjacent to the calcaneocuboid articulation. The remaining bony structures are intact. . The soft tissues are normal.      1. Postoperative changes in the distal tibia. 2. Abnormal density involving the neck of the fifth metacarpal. 3. Degenerative changes. 4. Diffuse osteopenia. 5. Soft tissue swelling over the dorsum of foot. . **This report has been created using voice recognition software. It may contain minor errors which are inherent in voice recognition technology. ** Final report electronically signed by DR Sherren Crosser on 1/10/2022 2:03 PM    IR ANGIOGRAM EXTREMITY RIGHT    Result Date: 1/17/2022  PROCEDURE: AORTOFEMORAL ANGIOGRAM/with runoff procedure/angioplasty: CLINICAL INFORMATION: 63year-old male with right lower extremity pain and balloons. Abnormal findings and previous right lower extremity vascular ultrasound. PERFORMED BY:  Dr. Liban Gomez. Travis Olszewski, MD APPROACH: Left common femoral artery, micropuncture technique. CATHETERS: 5 Western Silva VCF, 5 Wolof angled glide catheter, 4 mm x 25 cm inpact admiral balloon STENTS: None. VESSELS CATHETERIZED: Upper abdominal aorta, lower abdominal aorta, right and left external iliac arteries. Gwen Maid DIAGNOSTIC PROCEDURES: Abdominal aortogram, pelvic angiography, right leg arteriogram. INTERVENTIONS: Recanalization of the right superficial femoral artery. Angioplasty of multiple segments stenosis throughout the right superficial femoral and popliteal arteries. FLUOROSCOPY TIME: 19 minutes 7 seconds. FLUOROSCOPIC IMAGES: 109 SEDATION: Versed 2mg ; fentanyl 200mcg , IV; the patient was sedated for 90 minutes during this procedure and monitored with EKG and pulse ox monitoring devices by registered nurse. Face-to-face time with the patient was 90 minutes. OTHER MEDICATIONS: Heparin, 4000 units, IV. Patient was started on Plavix regimen, 75 mg, by mouth, daily, X. 45 days, starting with a 300 mg loading dose today. CONTRAST: 195 ml, Isovue-300. CLOSURE: Angio-Seal device, successful without complication. TECHNIQUE: Signed informed consent was obtained prior to performing this procedure. The patient was sedated, as indicated above. Access was obtained and a 5 Western Silva vascular sheath was inserted. The procedures as above were then performed. FINDINGS: AORTA: The aorta is unremarkable. The renal arteries are widely patent. PELVIC VESSELS: Both common, internal, and external iliac arteries are widely patent. Angiogram right leg: CFA AND PROFUNDA: The common and deep femoral arteries are widely patent. SFA AND POPLITEAL ARTERY: There is complete occlusion of the mid right SFA. There are extensive collateral vessels which reconstitute the distal SFA/popliteal artery.  There are innumerable additional areas of multifocal stenosis throughout the superficial femoral artery as well as the popliteal artery. TRIFURCATION VESSELS: All 3 trifurcation vessels are widely patent. At this point it was then decided to perform an intervention. A exchange length 0.035 inch stiff Glidewire was advanced over the aortic bifurcation into the superficial femoral artery. The existing 5 Western Silva vascular sheath was removed and a long 6 Western Silva  ANL sheath was advanced. The wire in conjunction with a glide catheter was successfully advanced through the stenotic portions of the right SFA and popliteal artery. Angioplasty was subsequently performed throughout the right leg. Post angioplasty images demonstrate significantly improved flow through the right lower extremity. The patient tolerated the procedure well with no immediate postprocedural complication. An Angio-Seal device was used for hemostasis at the left groin with no complication. Complete occlusion of the mid right SFA as well as multifocal stenosis throughout the SFA and popliteal artery, successfully treated with angioplasty with an excellent result. **This report has been created using voice recognition software. It may contain minor errors which are inherent in voice recognition technology. ** Final report electronically signed by Dr Galindo Lemus on 1/17/2022 6:36 PM    CTA ABDOMINAL AORTA W BILAT RUNOFF W WO CONTRAST    Result Date: 1/10/2022  PROCEDURE: CTA ABDOMINAL AORTA W BILAT RUNOFF W WO CONTRAST CLINICAL INFORMATION: LE wound, eschar, low pulsus, PAD . COMPARISON: No prior study. TECHNIQUE: A noncontrast localizer was obtained. 3 mm axial images were obtained through the abdomen, pelvis and both lower extremities after the administration of intravenous contrast.  3D reconstructions were performed on a dedicated 3-D workstation to  include sagittal and coronal mid images through the vasculature.  Centerline reconstructions were also obtained. All CT scans at this facility use dose modulation, iterative reconstruction, and/or weight-based dosing when appropriate to reduce radiation dose to as low as reasonably achievable. FINDINGS: CTA abdominal aorta and runoff : There is no abdominal aortic aneurysm. There is no dissection. The aorta is of normal caliber. The visualized aspects of the lung bases are clear. There is mild diffuse fatty replacement of the liver. . There are punctate calcifications in the spleen. There are small bilateral adrenal nodules. There is an atrophic pancreas disc dilatation of the pancreatic duct. There is a 2.2 cm fluid collection in  the region of the splenic hilum. The gallbladder is normal.   There is no hydronephrosis or stones of either kidney. No contour deforming renal masses are noted. . No abnormalities of the small bowel loops are noted. The IVC and aorta are of normal caliber. There is no adenopathy. The bladder is normal. The prostate gland measures 4.2 x 3.6 cm in size There is no colonic inflammation. There is relatively normal flow in the abdominal aorta to right and single left renal arteries and superior mesenteric and celiac axis arteries, right and left common, internal and external iliac arteries right and left common and proximal superficial femoral arteries. There appears be an area of significant narrowing in the distal right superficial femoral artery. Evaluation of the right leg is limited by motion artifact. There is flow in the left common and superficial femoral, popliteal, anterior and posterior tibial arteries. There is an intramedullary jaleesa in the right tibia. 1. Limited evaluation of the right leg secondary to patient motion artifact. 2. Relatively normal flow in the abdominal aorta, visceral arteries, right and left common, internal and external iliac arteries common femoral arteries. 3. There is an area of significant narrowing in the distal right superficial femoral artery.  4. There is relatively normal flow in the left common and superficial femoral, popliteal, anterior and posterior tibial arteries. 5. Intramedullary jaleesa in the right tibia-fibula. 6. Mild diffuse fatty replacement in the liver. 7. Punctate calcifications in spleen. 8. Small bilateral adrenal nodules. 9. Atrophic pancreas. Dilated pancreatic duct. 10. 2.2 cm fluid collection in the region of the splenic hilum. 11. Enlarged prostate gland. **This report has been created using voice recognition software. It may contain minor errors which are inherent in voice recognition technology. ** Final report electronically signed by DR Yesenia Steele on 1/10/2022 9:10 PM    US LIVER    Result Date: 1/13/2022  PROCEDURE: US LIVER, US DUP ABD PEL RETRO SCROT COMPLETE CLINICAL INFORMATION: Alcoholic cirrhosis. COMPARISON: CTA of the abdominal aorta dated 10 January 2022. TECHNIQUE: Multiplanar sonographic images were obtained of the liver. FINDINGS:  Liver - L= 20.7 cm Gallbladder - 9.4 x 2.8 x 2.7 cm Gallbladder Wall - 1.2 cm Common Duct - 0.4 cm Lopez's Sign: neg There is increased echogenicity in the liver. There are no liver masses. There is no intrahepatic biliary ductal dilatation. There is ascites adjacent to the left lobe of the liver. There is normal color flow and spectral analysis in the main portal vein, right and left portal veins, hepatic artery, inferior vena cava and all 3 hepatic veins. The pancreas was not adequately visualized. . There are small gallstones. There is a thickened gallbladder wall and pericholecystic fluid. . . The common bile duct is normal and measures 4 mm. There is fluid in the hepatorenal fossa. There is an area of abnormal echogenicity anterior to the inferior vena cava posterior to the liver. These findings may represent abnormal hepatic flexure and ascending colon possibly secondary to colitis. Please correlate clinically.       1. Increased echogenicity in the liver consistent with fatty infiltration. 2. There are gallstones, gallbladder wall thickening and pericholecystic fluid. 3. There is ascites adjacent to the left lobe of liver and fluid in the hepatorenal fossa. 4. Small area of abnormal echogenicity anterior to the inferior vena cava posterior to the liver. In correlation with previous CT angiogram, these findings may represent an abnormal hepatic flexure and ascending colon possibly related to colitis. Please correlate clinically. . **This report has been created using voice recognition software. It may contain minor errors which are inherent in voice recognition technology. ** Final report electronically signed by DR Joanie Daniel on 1/13/2022 1:29 PM    MRI FOOT LEFT WO CONTRAST    Result Date: 1/13/2022  PROCEDURE: MRI FOOT LEFT WO CONTRAST CLINICAL INFORMATION: Ulcer to dorsal left foot between metatarsals 1 and 2; ulcer probes to periosteum; rule out osteomyelitis COMPARISON: Left foot radiographs 1/10/2022 TECHNIQUE: Routine MRI of the left foot without contrast. FINDINGS: POSTOPERATIVE CHANGES: None. ALIGNMENT: Anatomic. MARROW SIGNAL/MARROW EDEMA/FRACTURE: 1. Fatty marrow signal is seen. 2. There is mild increase signal on STIR within the first metatarsal without a corresponding abnormality on T1 weighted imaging. Findings likely relate to reactive edema. 3. There is a linear hyperintensity at the base of the proximal phalanx of the fifth digit without corresponding T1 hypointensity that would suggest a stress reaction or reactive bone marrow edema. PLAFOND/TALAR DOME/ANKLE MORTISE: 1. Only evaluated on the sagittal view. 2. There is no osteochondral fracture. 3. The ankle mortise in intact. 4. Mild degenerative changes of the ankle joint. JOINTS: There is hallux valgus deformity of the first digit. Severe degenerative changes at the first metatarsophalangeal joint. Mild degenerative changes seen in the remainder of the left foot. TENDONS: Limited evaluation. Grossly intact.  SINUS TARSI/TARSAL TUNNEL: Unremarkable. MUSCULATURE: No significant atrophy JOINT FLUID: 1. Physiologic. SOFT TISSUES: Susceptibility artifact seen in the region of the proximal phalanx of the first digit likely relates to the presence of a soft tissue gas. The soft tissue ulcer appears to approximate the proximal phalanx of the first digit. Mild thickening  of the proximal plantar fascia. 1. The soft tissue ulcer appears to approximate the proximal phalanx of the first digit. Soft tissue gas is noted. Osteomyelitis cannot be excluded. 2. Increased bone marrow signal on STIR without corresponding T1 hypointensity in the first metatarsal is a nonspecific findings. Likely relates to reactive edema at this time. The finding is equivocal for osteomyelitis. 3. Linear hyperintensity and STIR in the base of the proximal phalanx of the fifth digit without corresponding T1 hypointensity likely relates to reactive bone marrow edema versus a stress reaction. Clinical correlation is recommended. 4. Marked degenerative changes of the first metatarsophalangeal joint. Hallux valgus deformity. **This report has been created using voice recognition software. It may contain minor errors which are inherent in voice recognition technology. ** Final report electronically signed by Dr Jarred Chaves on 1/13/2022 11:52 AM    VL DUP LOWER EXTREMITY ARTERIES BILATERAL    Result Date: 1/14/2022  PROCEDURE: VL DUP LOWER EXTREMITY ARTERIES BILATERAL CLINICAL INFORMATION: Assess blood flow for healing potential; possible surgical intervention in the near future (right foot TMA), Patient has frostbite to right distal forefoot; patient has full-thickness ulceration to dorsal left forefoot COMPARISON: No comparison study available. TECHNIQUE: Multiple permanent grayscale and color flow sonographic images of the major arteries of both lower extremities were obtained from the level of the groin to the level of the ankle .  Spectral Doppler waveforms were obtained and velocity measurements were measured. FINDINGS: RIGHT ARTERY (PSV cm/sec) CFA ---------------> 127 PSV cm/sec PROF -------------> 244 PSV cm/sec SFA PROX -------> 53 PSV cm/sec SFA MID ----------> 0 PSV cm/sec SFA DIST --------> 24 PSV cm/sec POP A PROX ---> 63 PSV cm/sec POP A DIST ----> 56 PSV cm/sec PTA ---------------> 57 PSV cm/sec PERONEAL -----> 17 PSV cm/sec KAREN ----------------> 30 PSV cm/sec LEFT ARTERY (PSV cm/sec) CFA ---------------> 172 PSV cm/sec PROF -------------> 84 PSV cm/sec SFA PROX -------> 84 PSV cm/sec SFA MID ----------> 124 PSV cm/sec SFA DIST --------> 95 PSV cm/sec POP A PROX ---> 121 PSV cm/sec POP A DIST ----> 152 PSV cm/sec PTA ---------------> 68 PSV cm/sec PERONEAL -----> 41 PSV cm/sec KAREN ----------------> 77 PSV cm/sec ROXANE RIGHT KAREN----->0.53 PTA----->0.59 TBI------>0 ROXANE LEFT KAREN----->0.92 PTA----->0.95 TBI------>0.72 VELOCITY MEASUREMENTS: Moderately diminished ABIs right side. Normal ABIs left side. RIGHT LEG: Excellent pulsatility in the common femoral artery. Significant step-off in velocity in the profunda, suggesting a mild stenosis. Moderately dampened pulsatility is seen in the proximal SFA with occlusion of the mid SFA and collateral reconstitution of the distal SFA with severely dampened monophasic flow. Relatively good flow is seen in the popliteal artery, however, as well as in the posterior tibial artery. There is moderately dampened pulsatility in the anterior tibial artery with  severely dampened pulsatility in the peroneal artery. LEFT LEG: Excellent pulsatility in the common femoral artery. Good pulsatility in the profunda. Good pulsatility is seen in the SFA diffusely as well as in the popliteal artery and all 3 trifurcation vessels. 1. Normal findings in the left leg. 2. Abnormal findings in the right leg,. Findings suggestive of mild stenosis at the origin of the profunda and possibly at the origin right SFA.  Total occlusion mid right SFA with distal collateral reconstitution. Also evidence for trifurcation disease right side. 3. Aortofemoral angiography is recommended for further evaluation with possible intervention. If desired, this can be arranged through the interventional scheduling desk of [facility]. [facility]'s radiology department ([phone]). **This report has been created using voice recognition software. It may contain minor errors which are inherent in voice recognition technology. ** Final report electronically signed by Dr. Manuel Uriarte on 1/14/2022 11:14 AM    US DUP ABD PEL RETRO SCROT COMPLETE    Result Date: 1/13/2022  PROCEDURE: US LIVER, US DUP ABD PEL RETRO SCROT COMPLETE CLINICAL INFORMATION: Alcoholic cirrhosis. COMPARISON: CTA of the abdominal aorta dated 10 January 2022. TECHNIQUE: Multiplanar sonographic images were obtained of the liver. FINDINGS:  Liver - L= 20.7 cm Gallbladder - 9.4 x 2.8 x 2.7 cm Gallbladder Wall - 1.2 cm Common Duct - 0.4 cm Lopez's Sign: neg There is increased echogenicity in the liver. There are no liver masses. There is no intrahepatic biliary ductal dilatation. There is ascites adjacent to the left lobe of the liver. There is normal color flow and spectral analysis in the main portal vein, right and left portal veins, hepatic artery, inferior vena cava and all 3 hepatic veins. The pancreas was not adequately visualized. . There are small gallstones. There is a thickened gallbladder wall and pericholecystic fluid. . . The common bile duct is normal and measures 4 mm. There is fluid in the hepatorenal fossa. There is an area of abnormal echogenicity anterior to the inferior vena cava posterior to the liver. These findings may represent abnormal hepatic flexure and ascending colon possibly secondary to colitis. Please correlate clinically. 1. Increased echogenicity in the liver consistent with fatty infiltration. 2. There are gallstones, gallbladder wall thickening and pericholecystic fluid.  3. There is ascites adjacent to the left lobe of liver and fluid in the hepatorenal fossa. 4. Small area of abnormal echogenicity anterior to the inferior vena cava posterior to the liver. In correlation with previous CT angiogram, these findings may represent an abnormal hepatic flexure and ascending colon possibly related to colitis. Please correlate clinically. . **This report has been created using voice recognition software. It may contain minor errors which are inherent in voice recognition technology. ** Final report electronically signed by DR Jay Jay Mcmanus on 1/13/2022 1:29 PM      Beau Baeza, SUSI - CNP

## 2022-01-23 NOTE — PROGRESS NOTES
Podiatric Progress Note  Edna Brenner  Subjective :   1/23/2022  Patient seen at bedside this morning on behalf of Dr. Yarely Rodriguez. Patient is pleasant, and is alert and oriented. He continues to endorse same intermittent, shooting-type pain to bilateral lower extremities. He denies any N/V/F/C/SOB/Cp or bilateral calf tenderness. Patient wanted to know if he could have the bed alarm disabled; patient expressed frustration that whenever he got up to use the bathroom, the alarm would go off and that a nurse wound enter the room to assist him. Patient states he can use the bathroom by himself. Patient again asked for the location of [address] [place] [address] facility. He has no other pedal complaints at this time. 1/22/2022  Patient seen at bedside this morning on behalf of Dr. Yarely Rodriguez. Patient is pleasant, and is alert and oriented. Patient continues to endorse intermittent shooting type pain; patient states that he feels the pain is going to his toes even though his toes are numb. Otherwise, patient denies any N/V/F/C/SOB/CP or bilateral calf tenderness. He has no other pedal complaints at this time. Patient again asked where care [facility] is, and if he be allowed to smoke outside if he is transferred there. No other questions. 1/21/2022  Patient seen at bedside today on behalf of Dr. Yarely Rodriguez. Patient is awake and pleasant, and is alert and oriented to person, place, and time. Patient continues to endorse 10/10 pain to his bilateral lower extremities, and states that he has intermittent intense shooting type pains to his bilateral lower extremities. Otherwise, patient states that he is feeling well overall. He denies any N/V/F/C/SOB/CP or bilateral calf tenderness. She denies any other pedal concerns at this time. Patient inquired if we have heard back from any of the facilities regarding his possible discharge. 1/20/2022  Patient seen at bedside today on behalf of Dr. Yarely Rodriguez. Patient is sleeping. After being awoken, patient is alert and oriented to person, place, and time. Patient denies any interval changes in his symptoms since yesterday's encounter. Patient was curious as to when he will be discharged and where he will be discharged to home. He denies any N/V/F/C/SOB/CP or bilateral calf tenderness. He has no other pedal complaints at this time. 1/19/2022  Patient seen at bedside today on behalf of Dr. Jeniffer Rashid. Patient is awake, and is alert and oriented to person, place, and time. Patient relates no interval change in his symptoms. Patient is curious when he will have his amputation. Patient is hopeful that he is able to be put into assisted living at discharge as they have a warm room and cable TV. Patient is concerned about his ability to be functional after an amputation. Patient endorses chronic shortness of breath that is unchanged recently, but denies any nausea, vomiting, fever, chills, chest pain, shortness of breath. No other pedal complaints. 1/8/2022  Patient seen at bedside today on behalf of Dr. Jeniffer Rashid. Patient is awake, and is alert and oriented to person, place, and time. Patient continues to endorse 10/10 pain to his legs; he also endorses 10/10 pain from his lower back. Otherwise, patient denies any N/V/F/C/SOB/CP or bilateral calf tenderness. Patient is curious to know what the timeline is for his eventual amputation surgery. He has no other pedal complaints at this time. 1/17/2022  Patient seen at bedside this morning on behalf of Dr. Jeniffer Rashid. Patient is alert and oriented to person, place, and time. He is not in acute distress. Patient states that overall, he is feeling good. He endorses pain to his bilateral lower extremities, and rates the pain as 10/10. Prior to today, patient denied any pain in bilateral lower extremities and stated that he could not feel anything that is bilateral lower extremities were constantly numb.   Patient asked if he could be allowed outside to smoke, as he states that the nicotine replacement therapy is not working well for him. Patient would like to know when his right foot will be amputated. Otherwise, he denies any N/V/F/C/SOB/CP or bilateral calf tenderness. He has no other pedal complaints at this time. 1/16/2022  Patient seen at bedside today on behalf of Dr. Leonidas Angel. Patient is alert and oriented to person, place, and time. Patient is extremely displeased that he is still in the hospital. He would like to be able to go smoke. He wants his right foot amputated, and is unsure why the amputation is being delayed. He denies any nausea, vomiting, fever, chills, chest pain, shortness of breath. No other pedal complaints. 1/15/2022  Patient seen at bedside today on behalf of Dr. Leonidas Angel. Patient alert and oriented. Patient continues to deny any pain to bilateral feet. He also denies any N/V/F/C/SOB/CP. That he is still at high risk for losing the digits of the right foot. Otherwise no new complaints. 1/14/2022  Patient seen at bedside today on behalf of Dr. Leonidas Angel. Patient alert and oriented. Patient continues to deny any pain to bilateral feet. He also denies any N/V/F/C/SOB/CP or bilateral calf tenderness. Patient inquired as to which of his toes he may be a losing; reiterated to patient that he has frostbite involving all 5 digits on the right foot, and that he is going to lose all 5 digits of the right foot. Otherwise, patient has no other pedal complaints at this time. 1/13/2022  Patient seen at bedside today on behalf of Dr. Leonidas Angel. Patient was somnolent during encounter; Per sitter, patient has been sleepy all day. Patient denies any pain to bilateral feet. He currently denies any N/V/F/C/SOB/CP or bilateral calf tenderness. He has no new pedal complaints at this time. 1/12/2022  Patient seen at bedside today alongside Dr. Leonidas Angel.  Patient appeared pleasant, was oriented to person, place and time and in no acute distress. Patient denies any pain to either of his feet. Patient also denies any N/V/F/C/SOB or CP. Patient has no further pedal concerns at this point in time. HPI:  The patient is a homeless 63 y.o. male with significant past medical history of substance abuse (cocaine use, alcohol) tobacco use, and incarcerated left inguinal hernia who being seen at bedside today on behalf of Dr. Charlette Xiong. Patient was admitted yesterday via [place] [address] ED for frostbite, foot wound, and lab abnormalities. Patient is pleasant, but very poor historian. Patient does not know how long he has had frostbite changes to his right foot nor the wound on his left foot. He thinks they must have been there for several weeks. He relates that his feet have been hurting for several weeks; however when asked by RN prior to getting pain medication, patient stated he cannot feel any pain in his lower legs as they are numb all the time and all of his pain is from his lower back. Patient admits drinking approximately 6 drinks per day daily for the last several years. He currently denies any N/V/F/C/SOB/CP or bilateral calf tenderness. He has no other pedal complaints at this time.     Current Medications:    Current Facility-Administered Medications: guaiFENesin-dextromethorphan (ROBITUSSIN DM) 100-10 MG/5ML syrup 10 mL, 10 mL, Oral, Q4H PRN  amoxicillin-clavulanate (AUGMENTIN) 875-125 MG per tablet 1 tablet, 1 tablet, Oral, 2 times per day  metoprolol (LOPRESSOR) injection 5 mg, 5 mg, IntraVENous, Q6H PRN  clopidogrel (PLAVIX) tablet 75 mg, 75 mg, Oral, Daily  nitroglycerin (NITRO-BID) 2 % ointment 0.5 inch, 0.5 inch, Topical, Daily  melatonin tablet 6 mg, 6 mg, Oral, Nightly PRN  hydrOXYzine (ATARAX) tablet 25 mg, 25 mg, Oral, Nightly PRN  nicotine (NICODERM CQ) 14 MG/24HR 1 patch, 1 patch, TransDERmal, Daily  haloperidol lactate (HALDOL) injection 1 mg, 1 mg, IntraMUSCular, Q6H PRN  0.9 % sodium chloride infusion, , IntraVENous, Continuous  HYDROcodone-acetaminophen (NORCO) 5-325 MG per tablet 1 tablet, 1 tablet, Oral, Q6H PRN  phosphorus replacement protocol, , Other, RX Placeholder  calcium replacement protocol, , Other, RX Placeholder  povidone-iodine (BETADINE) 10 % external solution, , Topical, PRN  sodium chloride flush 0.9 % injection 5-40 mL, 5-40 mL, IntraVENous, 2 times per day  sodium chloride flush 0.9 % injection 5-40 mL, 5-40 mL, IntraVENous, PRN  0.9 % sodium chloride infusion, 25 mL, IntraVENous, PRN  enoxaparin (LOVENOX) injection 40 mg, 40 mg, SubCUTAneous, Q24H  ondansetron (ZOFRAN-ODT) disintegrating tablet 4 mg, 4 mg, Oral, Q8H PRN **OR** ondansetron (ZOFRAN) injection 4 mg, 4 mg, IntraVENous, Q6H PRN  polyethylene glycol (GLYCOLAX) packet 17 g, 17 g, Oral, Daily PRN  acetaminophen (TYLENOL) tablet 650 mg, 650 mg, Oral, Q6H PRN **OR** acetaminophen (TYLENOL) suppository 650 mg, 650 mg, Rectal, Q6H PRN  thiamine tablet 100 mg, 100 mg, Oral, Daily  multivitamin 1 tablet, 1 tablet, Oral, Daily  potassium chloride (KLOR-CON M) extended release tablet 40 mEq, 40 mEq, Oral, PRN **OR** potassium bicarb-citric acid (EFFER-K) effervescent tablet 40 mEq, 40 mEq, Oral, PRN **OR** potassium chloride 10 mEq/100 mL IVPB (Peripheral Line), 10 mEq, IntraVENous, PRN  magnesium sulfate 2000 mg in 50 mL IVPB premix, 2,000 mg, IntraVENous, PRN  LORazepam (ATIVAN) tablet 1 mg, 1 mg, Oral, Q1H PRN **OR** LORazepam (ATIVAN) injection 1 mg, 1 mg, IntraVENous, Q1H PRN **OR** LORazepam (ATIVAN) tablet 2 mg, 2 mg, Oral, Q1H PRN **OR** LORazepam (ATIVAN) injection 2 mg, 2 mg, IntraVENous, Q1H PRN **OR** LORazepam (ATIVAN) tablet 3 mg, 3 mg, Oral, Q1H PRN **OR** LORazepam (ATIVAN) injection 3 mg, 3 mg, IntraVENous, Q1H PRN **OR** LORazepam (ATIVAN) tablet 4 mg, 4 mg, Oral, Q1H PRN **OR** LORazepam (ATIVAN) injection 4 mg, 4 mg, IntraVENous, Q1H PRN    Objective     BP (!) 154/78   Pulse 81   Temp 97.9 °F (36.6 °C) (Oral)   Resp 18 Ht 6' (1.829 m)   Wt 140 lb (63.5 kg)   SpO2 98%   BMI 18.99 kg/m²      I/O:    Intake/Output Summary (Last 24 hours) at 1/23/2022 1114  Last data filed at 1/23/2022 1020  Gross per 24 hour   Intake 3155.56 ml   Output 2300 ml   Net 855.56 ml              Wt Readings from Last 3 Encounters:   01/10/22 140 lb (63.5 kg)   07/06/21 150 lb (68 kg)   05/27/21 160 lb (72.6 kg)       LABS:    Recent Labs     01/22/22  0426 01/23/22  0547   WBC 4.1* 4.8   HGB 10.1* 9.9*   HCT 30.0* 29.6*    270        Recent Labs     01/23/22  0547      K 3.5      CO2 19*   BUN 3*   CREATININE 0.4        No results for input(s): PROT, INR, APTT in the last 72 hours. No results for input(s): CKTOTAL, CKMB, CKMBINDEX, TROPONINI in the last 72 hours. Focused Lower Extremity Examination:    Vitals:    BP (!) 154/78   Pulse 81   Temp 97.9 °F (36.6 °C) (Oral)   Resp 18   Ht 6' (1.829 m)   Wt 140 lb (63.5 kg)   SpO2 98%   BMI 18.99 kg/m²      Dermatologic: Dressings to BLE intact. There is a stable full-thickness ulceration noted to the dorsal aspect of left foot, between metatarsal necks 1 and 2. Wound bed with granular tissue with small areas of infarcted tissue. There is very scant serous drainage noted. No purulence or malodor noted. Wound appears stable at this time. Superficial thickness ulceration noted to distal aspect of left second digit; left second digit is mildly erythematous and edematous; erythema and edema appear to be improving. No drainage or malodor noted to superficial ulcer on the distal left second digit. On the right, there are extensive ischemic and necrotic changes to all 5 digits extending proximally to the distal metatarsal level. There is a deroofed blister noted to the dorsum of the right distal forefoot. There are superficial thickness ulcerations on the dorsal medial aspect of the right hallux and the distal tip of the left fifth digit.  Digits 1 through 5 of the distal right forefoot are beginning to harden and turn very dark in coloration. There is increasing duskiness to the rest of the digits extending toward the metatarsophalangeal joints on the right foot. There is increasing necrosis noted to the distal lateral aspect of the patient's right foot. There is increasing demarcation noted between viable and nonviable tissue (seen in the photographs). There is slight maceration noted to the dorsal aspect of the bases of digits 1-5 of right foot. There is slight malodor noted to the right forefoot. Erythema present on the proximal aspect of frostbitten tissue of right foot.     Vascular: Dorsalis pedis and posterior tibial pulses are palpable bilaterally; DP and PT pulses are diminished to the RLE. Skin temperature is warm to warm from proximal tibial tuberosity to distal digits of left foot; skin temperature is warm to  cool from proximal tibial tuberosity to distal forefoot and digits of right foot. Right foot is appreciably warmer after revascularization procedure. CFT within normal limits to all 5 digits of left foot; CFT absent to distal tips of all 5 digits of right foot. Edema is present to RLE. Pedal hair is absent bilaterally.     Neurovascular: Light touch sensation grossly diminished to the midfoot bilaterally.      Musculoskeletal: Muscle strength 4/5 and symmetric for all muscle groups crossing the ankle joint bilaterally. Decreased ROM noted at the ankle and ST joints bilaterally. No pain with palpation of any foot or ankle structures. Foot and ankle grossly rectus alignment bilaterally. STUDIES:  XR, right foot  Impression       1. Postoperative changes in the distal tibia. 2. Abnormal density involving the neck of the fifth metacarpal.   3. Degenerative changes. 4. Diffuse osteopenia. 5. Soft tissue swelling over the dorsum of foot. .                   **This report has been created using voice recognition software.  It may contain minor errors which are inherent in voice recognition technology. **       Final report electronically signed by DR Landy Jackson on 1/10/2022 2:03 PM      XR, left foot  Impression       1. Stable left foot radiographs, no interval change since previous study dated 30 January 2020.   2. Abnormal density involving the first metatarsophalangeal joint, unchanged. 3. Degenerative change. 4. No definite evidence of fracture, bony destruction or osteomyelitis. .                   **This report has been created using voice recognition software. It may contain minor errors which are inherent in voice recognition technology. **       Final report electronically signed by DR Landy Jackson on 1/10/2022 2:00 PM      CTA, abdominal aorta with bilateral runoff  Impression       1. Limited evaluation of the right leg secondary to patient motion artifact. 2. Relatively normal flow in the abdominal aorta, visceral arteries, right and left common, internal and external iliac arteries common femoral arteries. 3. There is an area of significant narrowing in the distal right superficial femoral artery. 4. There is relatively normal flow in the left common and superficial femoral, popliteal, anterior and posterior tibial arteries. 5. Intramedullary jaleesa in the right tibia-fibula. 6. Mild diffuse fatty replacement in the liver. 7. Punctate calcifications in spleen. 8. Small bilateral adrenal nodules. 9. Atrophic pancreas. Dilated pancreatic duct. 10. 2.2 cm fluid collection in the region of the splenic hilum. 11. Enlarged prostate gland.           **This report has been created using voice recognition software. It may contain minor errors which are inherent in voice recognition technology. **       Final report electronically signed by DR Landy Jackson on 1/10/2022 9:10 PM     MRI, left foot  Impression       1. The soft tissue ulcer appears to approximate the proximal phalanx of the first digit. Soft tissue gas is noted. Osteomyelitis cannot be excluded.       2. Increased bone marrow signal on STIR without corresponding T1 hypointensity in the first metatarsal is a nonspecific findings. Likely relates to reactive edema at this time. The finding is equivocal for osteomyelitis.       3. Linear hyperintensity and STIR in the base of the proximal phalanx of the fifth digit without corresponding T1 hypointensity likely relates to reactive bone marrow edema versus a stress reaction. Clinical correlation is recommended.       4. Marked degenerative changes of the first metatarsophalangeal joint. Hallux valgus deformity.               **This report has been created using voice recognition software.  It may contain minor errors which are inherent in voice recognition technology. **       Final report electronically signed by Dr Alfredo Ferrer on 1/13/2022 11:52 AM     Lower extremity arterial Doppler, bilateral  Impression   1. Normal findings in the left leg. 2. Abnormal findings in the right leg,. Findings suggestive of mild stenosis at the origin of the profunda and possibly at the origin right SFA. Total occlusion mid right SFA with distal collateral reconstitution. Also evidence for trifurcation disease    right side. 3. Aortofemoral angiography is recommended for further evaluation with possible intervention. If desired, this can be arranged through the interventional scheduling desk of [facility]'s radiology department ([phone]).             **This report has been created using voice recognition software.  It may contain minor errors which are inherent in voice recognition technology. **       Final report electronically signed by Dr. Radha Hawley on 1/14/2022 11:14 AM       ASSESSMENT: Pt. is a 63 y.o. male with:  Principle  Frostbite, right foot  Full-thickness ulceration, left foot    Chronic  Patient Active Problem List   Diagnosis    Incarcerated left inguinal hernia    Tobacco dependence    Substance induced mood disorder ([address]    Foot pain, bilateral    Wound infection    Moderate malnutrition (HCC)       PLAN:   Frostbite, right foot  Full-thickness ulceration, left foot  -Patient examined and evaluated. -WBC 4.8; patient afebrile  -Blood cultures 1 and 2, preliminary results: No growth  -Swab cultures results: Staphylococcus aureus and Pseudomonas aeruginosa  -Patient on p.o. Augmentin  -Reviewed MRI; impression above. Results equivocal for osteomyelitis. No definite changes consistent with osteomyelitis were noted. -Reviewed lower extremity arterial Doppler; impression above  -Patient is 6 days s/p angiogram with intervention  -Applied nitroglycerin paste to right forefoot, Betadine wet-to-dry to distal right forefoot; wrapped loosely with Kerlix; applied Mesalt packing to wound on dorsal left foot, betadine to wound periphery, and wrapped with loosely applied kerlix.  -Discontinued the Nitrobid paste  -Patient okay to weight-bear as tolerated while wearing the postop shoe; encouraged patient to minimize weightbearing  -Placed order for daily dressing changes with Betadine wet-to-dry to distal right forefoot and wound to dorsum of left forefoot wrapped with Kerlix rolls.  -Discussed with patient that it is dangerous to perform amputation before demarcation of skin. There is significant risk that amputation margins will become necrotic and that he will require more proximal amputation  -We will wait for clean demarcation of viable versus nonviable tissue before performing definitive amputation. There is no urgent need for performing definitive amputation. This can be performed outpatient.  -Awaiting to hear back from [facility] [place]) regarding accepting patient when bed becomes available  -Patient is okay for discharge provided that he will be able to receive daily dressing changes as above.   Patient may follow-up outpatient with Dr. Jarad Muse for determining level of demarcation of viable versus nonviable tissue, and definitive amputation.  -Patient verbalized understanding and agreement with the treatment plan as stated. All of his questions were answered to satisfaction. DISPO: Patient is stable for discharge per podiatry; follow-up outpatient with Dr. Callie Schrader, will need close follow-up upon discharge to ensure wounds of bilateral feet do not get infected and require more urgent amputation. Awaiting to hear back from [facility]) regarding placement.     Eitan Aranda DPM, PGY-2  Podiatric Surgical Resident  1/23/2022   11:14 AM

## 2022-01-24 VITALS
OXYGEN SATURATION: 96 % | HEART RATE: 71 BPM | BODY MASS INDEX: 18.96 KG/M2 | RESPIRATION RATE: 16 BRPM | DIASTOLIC BLOOD PRESSURE: 71 MMHG | SYSTOLIC BLOOD PRESSURE: 147 MMHG | HEIGHT: 72 IN | WEIGHT: 140 LBS | TEMPERATURE: 98 F

## 2022-01-24 LAB — SARS-COV-2, NAAT: NOT  DETECTED

## 2022-01-24 PROCEDURE — 2500000003 HC RX 250 WO HCPCS: Performed by: STUDENT IN AN ORGANIZED HEALTH CARE EDUCATION/TRAINING PROGRAM

## 2022-01-24 PROCEDURE — 99232 SBSQ HOSP IP/OBS MODERATE 35: CPT

## 2022-01-24 PROCEDURE — 6370000000 HC RX 637 (ALT 250 FOR IP): Performed by: RADIOLOGY

## 2022-01-24 PROCEDURE — 87635 SARS-COV-2 COVID-19 AMP PRB: CPT

## 2022-01-24 PROCEDURE — 6370000000 HC RX 637 (ALT 250 FOR IP): Performed by: STUDENT IN AN ORGANIZED HEALTH CARE EDUCATION/TRAINING PROGRAM

## 2022-01-24 PROCEDURE — 6370000000 HC RX 637 (ALT 250 FOR IP): Performed by: INTERNAL MEDICINE

## 2022-01-24 RX ORDER — LANOLIN ALCOHOL/MO/W.PET/CERES
100 CREAM (GRAM) TOPICAL DAILY
Qty: 30 TABLET | Refills: 0
Start: 2022-01-25

## 2022-01-24 RX ORDER — LISINOPRIL 5 MG/1
5 TABLET ORAL DAILY
Qty: 30 TABLET | Refills: 0
Start: 2022-01-25

## 2022-01-24 RX ORDER — MULTIVITAMIN WITH IRON
1 TABLET ORAL DAILY
Qty: 30 TABLET | Refills: 0
Start: 2022-01-25

## 2022-01-24 RX ORDER — LISINOPRIL 5 MG/1
5 TABLET ORAL DAILY
Status: DISCONTINUED | OUTPATIENT
Start: 2022-01-25 | End: 2022-01-24 | Stop reason: HOSPADM

## 2022-01-24 RX ORDER — LANOLIN ALCOHOL/MO/W.PET/CERES
6 CREAM (GRAM) TOPICAL NIGHTLY PRN
Qty: 10 TABLET | Refills: 0
Start: 2022-01-24

## 2022-01-24 RX ORDER — AMOXICILLIN AND CLAVULANATE POTASSIUM 875; 125 MG/1; MG/1
1 TABLET, FILM COATED ORAL EVERY 12 HOURS SCHEDULED
Qty: 4 TABLET | Refills: 0
Start: 2022-01-24 | End: 2022-01-26

## 2022-01-24 RX ORDER — CLOPIDOGREL BISULFATE 75 MG/1
75 TABLET ORAL DAILY
Qty: 30 TABLET | Refills: 0
Start: 2022-01-25 | End: 2022-09-26

## 2022-01-24 RX ORDER — HYDROCODONE BITARTRATE AND ACETAMINOPHEN 5; 325 MG/1; MG/1
1 TABLET ORAL EVERY 6 HOURS PRN
Qty: 12 TABLET | Refills: 0 | Status: SHIPPED | OUTPATIENT
Start: 2022-01-24 | End: 2022-01-27

## 2022-01-24 RX ORDER — PANTOPRAZOLE SODIUM 20 MG/1
40 TABLET, DELAYED RELEASE ORAL DAILY
Qty: 60 TABLET | Refills: 0
Start: 2022-01-24 | End: 2022-10-03

## 2022-01-24 RX ADMIN — Medication 1 TABLET: at 08:56

## 2022-01-24 RX ADMIN — AMOXICILLIN AND CLAVULANATE POTASSIUM 1 TABLET: 875; 125 TABLET, FILM COATED ORAL at 08:56

## 2022-01-24 RX ADMIN — METOPROLOL TARTRATE 5 MG: 5 INJECTION INTRAVENOUS at 07:36

## 2022-01-24 RX ADMIN — HYDROCODONE BITARTRATE AND ACETAMINOPHEN 1 TABLET: 5; 325 TABLET ORAL at 05:55

## 2022-01-24 RX ADMIN — CLOPIDOGREL BISULFATE 75 MG: 75 TABLET, FILM COATED ORAL at 08:56

## 2022-01-24 RX ADMIN — Medication 100 MG: at 08:56

## 2022-01-24 ASSESSMENT — PAIN DESCRIPTION - ORIENTATION: ORIENTATION: RIGHT

## 2022-01-24 ASSESSMENT — PAIN SCALES - GENERAL
PAINLEVEL_OUTOF10: 10
PAINLEVEL_OUTOF10: 5

## 2022-01-24 ASSESSMENT — PAIN DESCRIPTION - PAIN TYPE: TYPE: CHRONIC PAIN

## 2022-01-24 ASSESSMENT — PAIN DESCRIPTION - LOCATION: LOCATION: FOOT

## 2022-01-24 NOTE — PROGRESS NOTES
Report called to care core. All paperwork faxed over. Patient belongings including money and cigarettes. Patient left via wheelchair by LACP.

## 2022-01-24 NOTE — CARE COORDINATION
1/24/22, 10:35 AM EST    [facility] [address] will accept, can take today. Need negative covid test before discharge. Discussed with patient, and he is in agreement. Transport available at 6:30 pm.    Radha Knox faxed    1/24/22, 2:01 PM EST    Patient goals/plan/ treatment preferences discussed by  and . Patient goals/plan/ treatment preferences reviewed with patient/ family. Patient/ family verbalize understanding of discharge plan and are in agreement with goal/plan/treatment preferences. Understanding was demonstrated using the teach back method. AVS provided by RN at time of discharge, which includes all necessary medical information pertaining to the patients current course of illness, treatment, post-discharge goals of care, and treatment preferences.     Services After Discharge  Services At/After Discharge: Renato Hadley 7 ([address] [facility]STINA VALADE[facility] II.[place]ERT[place])

## 2022-01-24 NOTE — CARE COORDINATION
1/24/22, 9:05 AM EST    DISCHARGE PLANNING EVALUATION      Spoke with [facility] Admissions. Facility staff is reviewing today for possible admission.

## 2022-01-24 NOTE — DISCHARGE SUMMARY
Discharge Summary    Patient:  Kelvin Limon  YOB: 1960    MRN: [id number]   Acct: [de-identified]    Primary Care Physician: No primary care provider on file. Admit date:  1/10/2022    Discharge date:   01/24/2022      Kika Swift is a 63year old male patient with PMH of EtOH and cocaine abuse who presented with bilateral lower extremity pain and numbness. In ER, the patient was found to have bilateral lower extremity eschars along with ulcers and discolored toes.  Admission labs showed multiple significant abnormalities which included hyponatremia, hypokalemia, hyperbilirubinemia leukocytosis, macrocytic anemia, rhabdomyolysis. Discharge Diagnoses Hospital Course:   Assessment and Plan:  1. Bilateral LE Ulcers/Eschar/?Frostbite on RLE: Patient presents with what appears to be frostbite of the right foot as well as a full-thickness ulceration of the left foot.  Patient is afebrile however leukocytosis was present on arrival.  All digits of the right foot appear necrotic, foul-smelling and gangrenous which extends to the metatarsal level.  The left foot has an open ulcer on the dorsal aspect near the second digit however does not appear to be necrotic appearing as the right foot.  CTA of the abdominal aorta with bilateral runoff shows an area of significant narrowing in the distal right superficial femoral artery with what appears to be relatively normal flow in the left LE circulation.  Podiatry consulted and performing daily wound care/Betadine debridement.  MRI 1/13/2022 reveals what appears to be osteomyelitis in the first digit and metatarsal and soft tissue gas as noted by radiologist. Terri Haleard underwent RLE arteriogram with angioplasty on 1/17/2022 by IR. Broad spectrum antibiotics were administered.     ? As per podiatry, plan for eventual TMA as outpatient.  No indication for immediate amputation at this time. ?  Continue Augmentin for 7 days.  No further indication for pseudomonal coverage given length of therapy with Zosyn.  Four doses left of Augmentin on discharge. 2. Essential Hypertension Uncontrolled: Patient reports having history of hypertension but noncompliant with medications prescribed in the past. Blood pressures in the 651U systolic overnight. Patient has received several doses of IV lopressor during this admission. Will add daily low dose Lisinopril to begin tomorrow morning at [facility]. 3. 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 Charl Res studies done suggest inflammatory anemia.  Benign reticulocyte count, however patient has been having a drop in his chronic macrocytic hemoglobin compared to his usual baseline.  Leukopenia has resolved. Hbg is stable at 9.9 at last draw. 4. History of EtOH Abuse: CIWA protocol, IV fluids, thiamine, and folic acid during admission. Patient no longer agitated as seen in alcohol abuse.    5. Diffuse Alcoholic Fatty Liver Disease without Cirrhosis: Evidence for acute EtOH toxic insult due to mild transaminitis with AST > ALT. Both now downtrending, indicating recovery from EtOH toxic insult.  INR 1.56.  Platelet count greater than 150,000.  Ultrasound of the liver shows fatty infiltration with concurrent hypoechogenicity, normal color flow via Doppler studies.  No CBD dilatation.  Patient at risk for developing fibrotic changes with continued drinking and eventual cirrhosis. 6. Severe Hypoalbuminemia: Due to severe malnutrition and chronic EtOH use.   ? IV albumin x3 doses given on 1/12-1/13/2022 and dietitian following during admission. 7. Moderate Acute on Chronic Asymptomatic Hypovolemic Hyponatremia, resolved. ? Sodium on admission was 127, maintenance fluids administered. Sodium 139 on last draw before discharge. 8. Mild Rhabdomyolysis: Resolved with IV hydration during admission. Patient will be discharged today to skilled nursing facility.  Follow up with podiatry/wound care. Prescription for Norco on discharge. Continue Augmentin for four doses. Will start Lisinopril tomorrow for hypertension. Consultants:  Podiatry, Wound care    Discharge Medications:       Medication List      START taking these medications    amoxicillin-clavulanate 875-125 MG per tablet  Commonly known as: AUGMENTIN  Take 1 tablet by mouth every 12 hours for 4 doses     clopidogrel 75 MG tablet  Commonly known as: PLAVIX  Take 1 tablet by mouth daily  Start taking on: January 25, 2022     HYDROcodone-acetaminophen 5-325 MG per tablet  Commonly known as: NORCO  Take 1 tablet by mouth every 6 hours as needed for Pain for up to 3 days. lisinopril 5 MG tablet  Commonly known as: PRINIVIL;ZESTRIL  Take 1 tablet by mouth daily  Start taking on: January 25, 2022     melatonin 3 MG Tabs tablet  Take 2 tablets by mouth nightly as needed (sleep)     multivitamin Tabs tablet  Take 1 tablet by mouth daily  Start taking on: January 25, 2022     thiamine 100 MG tablet  Take 1 tablet by mouth daily  Start taking on: January 25, 2022        [employer] taking these medications    pantoprazole 20 MG tablet  Commonly known as: Protonix  Take 2 tablets by mouth daily for 30 doses           Where to Get Your Medications      You can get these medications from any pharmacy    Bring a paper prescription for each of these medications  · HYDROcodone-acetaminophen 5-325 MG per tablet     Information about where to get these medications is not yet available    Ask your nurse or doctor about these medications  · amoxicillin-clavulanate 875-125 MG per tablet  · clopidogrel 75 MG tablet  · lisinopril 5 MG tablet  · melatonin 3 MG Tabs tablet  · multivitamin Tabs tablet  · pantoprazole 20 MG tablet  · thiamine 100 MG tablet           Physical Exam:  General appearance: No apparent distress, chronically ill appearing  HEENT: Pupils equal, round, and reactive to light. Conjunctivae/corneas clear.   Neck: Supple, with full range of motion. No jugular venous distention. Trachea midline. Respiratory:  Normal respiratory effort. Clear to auscultation  Cardiovascular: Regular rate and rhythm without murmurs, rubs or gallops. Abdomen: Soft, non-tender, non-distended with normal bowel sounds. Extremities: Bilateral feet wrapped with kerlix dressing, left CDI, right, beadine soaked at toes. Skin: Skin color, texture, turgor normal.  No rashes or lesions. Neurologic:  No focal deficits. Psychiatric: Alert and oriented, concerns about placement at discharge  Capillary Refill: delayed    Vitals:  Vitals:    01/23/22 2030 01/23/22 2345 01/24/22 0330 01/24/22 0842   BP: (!) 166/80 (!) 154/82 (!) 181/84 (!) 147/71   Pulse: 76 69 74 71   Resp: 16  18 16   Temp: 98.5 °F (36.9 °C)  97.7 °F (36.5 °C) 98 °F (36.7 °C)   TempSrc: Oral  Oral Oral   SpO2: 96%  98% 96%   Weight:       Height:         Weight: Weight: 140 lb (63.5 kg)     24 hour intake/output:    Intake/Output Summary (Last 24 hours) at 1/24/2022 1255  Last data filed at 1/24/2022 0844  Gross per 24 hour   Intake 1170 ml   Output 2700 ml   Net -1530 ml             Radiology reports as per the Radiologist  Radiology: XR FOOT LEFT (MIN 3 VIEWS)    Result Date: 1/10/2022  PROCEDURE: XR FOOT LEFT (MIN 3 VIEWS) CLINICAL INFORMATION: frostbite. COMPARISON: Left foot radiographs dated 30 January 2020. TECHNIQUE: 4 views of the left foot. FINDINGS:  There is abnormal density involving the first metatarsophalangeal joint, unchanged since remote radiographs dated 30 January 2020. There is degenerative change. There is no fracture. There is no definite bony destruction noted. There is no radiographic evidence for osteomyelitis. The calcaneus is normal. The soft tissues are normal.      1. Stable left foot radiographs, no interval change since previous study dated 30 January 2020. 2. Abnormal density involving the first metatarsophalangeal joint, unchanged. 3. Degenerative change.  4. No definite evidence of fracture, bony destruction or osteomyelitis. . **This report has been created using voice recognition software. It may contain minor errors which are inherent in voice recognition technology. ** Final report electronically signed by DR Sherren Crosser on 1/10/2022 2:00 PM    XR FOOT RIGHT (MIN 3 VIEWS)    Result Date: 1/10/2022  PROCEDURE: XR FOOT RIGHT (MIN 3 VIEWS) CLINICAL INFORMATION: frostbite. COMPARISON: No prior study. TECHNIQUE: 4 views of the right foot. FINDINGS:  There are postoperative changes with an intramedullary jaleesa and screw in the distal tibia. There is abnormal density involving the neck of the fifth metacarpal. There are degenerative changes. There is diffuse osteopenia. There is soft tissue swelling over the dorsum of the foot. There are small calcifications adjacent to the calcaneocuboid articulation. The remaining bony structures are intact. . The soft tissues are normal.      1. Postoperative changes in the distal tibia. 2. Abnormal density involving the neck of the fifth metacarpal. 3. Degenerative changes. 4. Diffuse osteopenia. 5. Soft tissue swelling over the dorsum of foot. . **This report has been created using voice recognition software. It may contain minor errors which are inherent in voice recognition technology. ** Final report electronically signed by DR Sherren Crosser on 1/10/2022 2:03 PM    CTA ABDOMINAL AORTA W BILAT RUNOFF W WO CONTRAST    Result Date: 1/10/2022  PROCEDURE: CTA ABDOMINAL AORTA W BILAT RUNOFF W WO CONTRAST CLINICAL INFORMATION: LE wound, eschar, low pulsus, PAD . COMPARISON: No prior study. TECHNIQUE: A noncontrast localizer was obtained. 3 mm axial images were obtained through the abdomen, pelvis and both lower extremities after the administration of intravenous contrast.  3D reconstructions were performed on a dedicated 3-D workstation to  include sagittal and coronal mid images through the vasculature. Centerline reconstructions were also obtained. All CT scans at this facility use dose modulation, iterative reconstruction, and/or weight-based dosing when appropriate to reduce radiation dose to as low as reasonably achievable. FINDINGS: CTA abdominal aorta and runoff : There is no abdominal aortic aneurysm. There is no dissection. The aorta is of normal caliber. The visualized aspects of the lung bases are clear. There is mild diffuse fatty replacement of the liver. . There are punctate calcifications in the spleen. There are small bilateral adrenal nodules. There is an atrophic pancreas disc dilatation of the pancreatic duct. There is a 2.2 cm fluid collection in  the region of the splenic hilum. The gallbladder is normal.   There is no hydronephrosis or stones of either kidney. No contour deforming renal masses are noted. . No abnormalities of the small bowel loops are noted. The IVC and aorta are of normal caliber. There is no adenopathy. The bladder is normal. The prostate gland measures 4.2 x 3.6 cm in size There is no colonic inflammation. There is relatively normal flow in the abdominal aorta to right and single left renal arteries and superior mesenteric and celiac axis arteries, right and left common, internal and external iliac arteries right and left common and proximal superficial femoral arteries. There appears be an area of significant narrowing in the distal right superficial femoral artery. Evaluation of the right leg is limited by motion artifact. There is flow in the left common and superficial femoral, popliteal, anterior and posterior tibial arteries. There is an intramedullary jaleesa in the right tibia. 1. Limited evaluation of the right leg secondary to patient motion artifact. 2. Relatively normal flow in the abdominal aorta, visceral arteries, right and left common, internal and external iliac arteries common femoral arteries. 3. There is an area of significant narrowing in the distal right superficial femoral artery.  4. There is relatively normal flow in the left common and superficial femoral, popliteal, anterior and posterior tibial arteries. 5. Intramedullary jaleesa in the right tibia-fibula. 6. Mild diffuse fatty replacement in the liver. 7. Punctate calcifications in spleen. 8. Small bilateral adrenal nodules. 9. Atrophic pancreas. Dilated pancreatic duct. 10. 2.2 cm fluid collection in the region of the splenic hilum. 11. Enlarged prostate gland. **This report has been created using voice recognition software. It may contain minor errors which are inherent in voice recognition technology. ** Final report electronically signed by DR Evie Ferreira on 1/10/2022 9:10 PM       Results for orders placed or performed during the hospital encounter of 01/10/22   COVID-19, Rapid    Specimen: Nasopharyngeal Swab   Result Value Ref Range    SARS-CoV-2, NAAT NOT  DETECTED NOT DETECTED   Culture, Blood 1    Specimen: Blood   Result Value Ref Range    Blood Culture, Routine No growth-preliminary No growth     Culture, Blood 2    Specimen: Blood   Result Value Ref Range    Blood Culture, Routine No growth-preliminary No growth     Culture, Urine    Specimen: Urine, clean catch   Result Value Ref Range    Urine Culture, Routine (A)      Mixed growth. The mixture of organisms present represents both organisms that may cause urinary tract infections and organisms that are not a common cause of urinary tract infections and are possibly skin cristhian or distal urethral cristhian. Organism Mixed Growth (A)    Culture, Anaerobic and Aerobic    Specimen: Ulcer   Result Value Ref Range    Anaerobic Culture       Culture yielded light mixed growth, including multiple colony types of anaerobic gram positive cocci. If a true mixed aerobic and anaerobic infection is suspected, then broad spectrum empiric antibiotic therapy is indicated and should include coverage for anaerobic organisms. Gram Stain Result       Rare segmented neutrophils observed.  No epithelial cells observed. No bacteria seen. Organism Staphylococcus aureus (A)     Aerobic Culture       light growth In the treatment of gram positive infections, GENTAMICIN should be CONSIDERED a SYNERGYSTIC agent ONLY. Moxifloxacin, regardless of in vitro sensitivity, should not be used for staphylococcal infections. Organism Pseudomonas aeruginosa (A)     Aerobic Culture light growth         Susceptibility    Staphylococcus aureus - BACTERIAL SUSCEPTIBILITY PANEL BY MIGUEL ANGEL     gentamicin <=0.5 Sensitive mcg/mL     clindamycin <=0.25 Sensitive mcg/mL     vancomycin 1 Sensitive mcg/mL     tetracycline <=1 Sensitive mcg/mL     oxacillin <=0.25 Sensitive mcg/mL     trimethoprim-sulfamethoxazole <=10 Sensitive mcg/mL    Pseudomonas aeruginosa - BACTERIAL SUSCEPTIBILITY PANEL BY MIGUEL ANGEL     gentamicin 4 Sensitive mcg/mL     ciprofloxacin 0.5 Sensitive mcg/mL     piperacillin-tazobactam 8 Sensitive mcg/mL     cefepime 4 Sensitive mcg/mL     tobramycin <=1 Sensitive mcg/mL   Culture, Anaerobic and Aerobic    Specimen: Tissue   Result Value Ref Range    Anaerobic Culture       Culture yielded light growth of anaerobic gram positive cocci. If a true mixed aerobic and anaerobic infection is suspected, then broad spectrum empiric antibiotic therapy is indicated and should include coverage for anaerobic organisms. Gram Stain Result       Rare segmented neutrophils observed. No epithelial cells observed. No bacteria seen. Organism Staphylococcus aureus (A)     Aerobic Culture       light growth In the treatment of gram positive infections, GENTAMICIN should be CONSIDERED a SYNERGYSTIC agent ONLY. Moxifloxacin, regardless of in vitro sensitivity, should not be used for staphylococcal infections.      Organism Pseudomonas aeruginosa (A)     Aerobic Culture very light growth         Susceptibility    Staphylococcus aureus - BACTERIAL SUSCEPTIBILITY PANEL BY MIGUEL ANGEL     gentamicin <=0.5 Sensitive mcg/mL     clindamycin <=0.25 Sensitive mcg/mL     vancomycin 1 Sensitive mcg/mL     tetracycline <=1 Sensitive mcg/mL     oxacillin <=0.25 Sensitive mcg/mL     trimethoprim-sulfamethoxazole <=10 Sensitive mcg/mL    Pseudomonas aeruginosa - BACTERIAL SUSCEPTIBILITY PANEL BY MIGUEL ANGEL     gentamicin 4 Sensitive mcg/mL     ciprofloxacin <=0.25 Sensitive mcg/mL     piperacillin-tazobactam 8 Sensitive mcg/mL     cefepime <=1 Sensitive mcg/mL     tobramycin <=1 Sensitive mcg/mL   CBC Auto Differential   Result Value Ref Range    WBC 16.7 (H) 4.8 - 10.8 thou/mm3    RBC 4.04 (L) 4.70 - 6.10 mill/mm3    Hemoglobin 14.4 14.0 - 18.0 gm/dl    Hematocrit 40.0 (L) 42.0 - 52.0 %    MCV 99.0 (H) 80.0 - 94.0 fL    MCH 35.6 (H) 26.0 - 33.0 pg    MCHC 36.0 (H) 32.2 - 35.5 gm/dl    RDW-CV 13.4 11.5 - 14.5 %    RDW-SD 49.6 (H) 35.0 - 45.0 fL    Platelets 316 188 - 256 thou/mm3    MPV 10.0 9.4 - 12.4 fL    Seg Neutrophils 81.7 %    Lymphocytes 4.9 %    Monocytes 8.3 %    Eosinophils 0.0 %    Basophils 0.8 %    Immature Granulocytes 4.3 %    Platelet Estimate ADEQUATE Adequate    Segs Absolute 13.6 (H) 1.8 - 7.7 thou/mm3    Lymphocytes Absolute 0.8 (L) 1.0 - 4.8 thou/mm3    Monocytes Absolute 1.4 (H) 0.4 - 1.3 thou/mm3    Eosinophils Absolute 0.0 0.0 - 0.4 thou/mm3    Basophils Absolute 0.1 0.0 - 0.1 thou/mm3    Immature Grans (Abs) 0.72 (H) 0.00 - 0.07 thou/mm3    nRBC 0 /100 wbc    Toxic Granulation Present Absent   Basic Metabolic Panel w/ Reflex to MG   Result Value Ref Range    Sodium 127 (L) 135 - 145 meq/L    Potassium reflex Magnesium 3.0 (L) 3.5 - 5.2 meq/L    Chloride 88 (L) 98 - 111 meq/L    CO2 27 23 - 33 meq/L    Glucose 130 (H) 70 - 108 mg/dL    BUN 9 7 - 22 mg/dL    CREATININE 0.6 0.4 - 1.2 mg/dL    Calcium 9.3 8.5 - 10.5 mg/dL   Hepatic Function Panel   Result Value Ref Range    Albumin 3.4 (L) 3.5 - 5.1 g/dL    Total Bilirubin 1.5 (H) 0.3 - 1.2 mg/dL    Bilirubin, Direct 0.6 (H) 0.0 - 0.3 mg/dL    Alkaline Phosphatase 60 38 - 126 U/L     (H) 5 - 40 U/L    ALT 52 11 - 66 U/L    Total Protein 7.3 6.1 - 8.0 g/dL   Vitamin B12 & folate   Result Value Ref Range    Vitamin B-12 576 211 - 911 pg/mL    Folate 9.8 4.8 - 24.2 ng/mL   Anion Gap   Result Value Ref Range    Anion Gap 12.0 8.0 - 16.0 meq/L   Magnesium   Result Value Ref Range    Magnesium 1.9 1.6 - 2.4 mg/dL   Osmolality   Result Value Ref Range    Osmolality Calc 255.7 (L) 275.0 - 300.0 mOsmol/kg   Glomerular Filtration Rate, Estimated   Result Value Ref Range    Est, Glom Filt Rate >90 ml/min/1.73m2   Scan of Blood Smear   Result Value Ref Range    SCAN OF BLOOD SMEAR see below    C-reactive protein   Result Value Ref Range    CRP 12.22 (H) 0.00 - 1.00 mg/dl   Lactate, Sepsis   Result Value Ref Range    Lactic Acid, Sepsis 1.4 0.5 - 1.9 mmol/L   Procalcitonin   Result Value Ref Range    Procalcitonin 0.03 0.01 - 0.09 ng/mL   Urinalysis with microscopic   Result Value Ref Range    Glucose, Urine see below NEGATIVE mg/dl    Bilirubin Urine MODERATE (A) NEGATIVE    Ketones, Urine see below NEGATIVE    Specific Gravity, UA see below 1.002 - 1.030    Blood, Urine see below NEGATIVE    pH, UA see below 5.0 - 9.0    Protein, UA see below NEGATIVE mg/dl    Urobilinogen, Urine see below 0.0 - 1.0 eu/dl    Nitrite, Urine see below NEGATIVE    Leukocyte Esterase, Urine see below NEGATIVE    Color, UA ORANGE YELLOW-STRAW    Character, Urine CLEAR [id number]    RBC, UA 3-5 0-2/hpf /hpf    WBC, UA 0-2 0-4/hpf /hpf    Epithelial Cells, UA 0-2 3-5/hpf /hpf    Mucus, UA THREADS NONE SEEN/THREA    Bacteria, UA NONE SEEN FEW/NONE SEEN    Casts 0-4 HYALINE NONE SEEN /lpf    Crystals NONE SEEN NONE SEEN    Renal Epithelial, UA NONE SEEN NONE SEEN    Yeast, UA NONE SEEN NONE SEEN    Casts NONE SEEN /lpf    Miscellaneous Lab Test Result NONE SEEN    Urine Drug Screen   Result Value Ref Range    AMPHETAMINE+METHAMPHETAMINE URINE SCREEN Negative NEGATIVE    Barbiturate Quant, Ur Negative NEGATIVE    Benzodiazepine Quant, Ur Negative NEGATIVE    Cannabinoid Quant, Ur Negative NEGATIVE    Cocaine Metab Quant, Ur Negative NEGATIVE    Opiates, Urine Negative NEGATIVE    Oxycodone Negative NEGATIVE    PCP Quant, Ur Negative NEGATIVE   Blood alcohol level   Result Value Ref Range    ETHYL ALCOHOL, SERUM < 0.01 0.00 %   CK   Result Value Ref Range    Total CK 3,452 (H) 55 - 170 U/L   CBC auto differential   Result Value Ref Range    WBC 15.6 (H) 4.8 - 10.8 thou/mm3    RBC 3.30 (L) 4.70 - 6.10 mill/mm3    Hemoglobin 11.6 (L) 14.0 - 18.0 gm/dl    Hematocrit 33.3 (L) 42.0 - 52.0 %    .9 (H) 80.0 - 94.0 fL    MCH 35.2 (H) 26.0 - 33.0 pg    MCHC 34.8 32.2 - 35.5 gm/dl    RDW-CV 13.4 11.5 - 14.5 %    RDW-SD 49.7 (H) 35.0 - 45.0 fL    Platelets 260 491 - 282 thou/mm3    MPV 10.1 9.4 - 12.4 fL    Seg Neutrophils 75.9 %    Lymphocytes 8.2 %    Monocytes 11.9 %    Eosinophils 0.3 %    Basophils 0.6 %    Immature Granulocytes 3.1 %    Segs Absolute 11.8 (H) 1.8 - 7.7 thou/mm3    Lymphocytes Absolute 1.3 1.0 - 4.8 thou/mm3    Monocytes Absolute 1.9 (H) 0.4 - 1.3 thou/mm3    Eosinophils Absolute 0.0 0.0 - 0.4 thou/mm3    Basophils Absolute 0.1 0.0 - 0.1 thou/mm3    Immature Grans (Abs) 0.48 (H) 0.00 - 0.07 thou/mm3    nRBC 0 /100 wbc   Comprehensive Metabolic Panel w/ Reflex to MG   Result Value Ref Range    Glucose 103 70 - 108 mg/dL    CREATININE 0.5 0.4 - 1.2 mg/dL    BUN 8 7 - 22 mg/dL    Sodium 128 (L) 135 - 145 meq/L    Potassium reflex Magnesium 3.2 (L) 3.5 - 5.2 meq/L    Chloride 94 (L) 98 - 111 meq/L    CO2 23 23 - 33 meq/L    Calcium 8.2 (L) 8.5 - 10.5 mg/dL    AST 93 (H) 5 - 40 U/L    Alkaline Phosphatase 49 38 - 126 U/L    Total Protein 5.4 (L) 6.1 - 8.0 g/dL    Albumin 2.7 (L) 3.5 - 5.1 g/dL    Total Bilirubin 1.3 (H) 0.3 - 1.2 mg/dL    ALT 41 11 - 66 U/L   Sodium, urine, random   Result Value Ref Range    Sodium, Ur < 20 meq/l   Creatinine, Random Urine   Result Value Ref Range    Creatinine, Urine 239.9 mg/dl   Osmolality, urine   Result Value Ref Range    Osmolality, Ur 641 250 - 750 mosmol/kg   ICTOTEST, URINE   Result Value Ref Range    Ictotest NEGATIVE NEGATIVE   Anion Gap   Result Value Ref Range    Anion Gap 11.0 8.0 - 16.0 meq/L   Glomerular Filtration Rate, Estimated   Result Value Ref Range    Est, Glom Filt Rate >90 ml/min/1.73m2   Magnesium   Result Value Ref Range    Magnesium 1.6 1.6 - 2.4 mg/dL   Osmolality   Result Value Ref Range    Osmolality Calc 255.7 (L) 275.0 - 300.0 mOsmol/kg   Comprehensive Metabolic Panel   Result Value Ref Range    Glucose 98 70 - 108 mg/dL    CREATININE 0.6 0.4 - 1.2 mg/dL    BUN 6 (L) 7 - 22 mg/dL    Sodium 126 (L) 135 - 145 meq/L    Potassium 3.2 (L) 3.5 - 5.2 meq/L    Chloride 94 (L) 98 - 111 meq/L    CO2 23 23 - 33 meq/L    Calcium 8.1 (L) 8.5 - 10.5 mg/dL    AST 71 (H) 5 - 40 U/L    Alkaline Phosphatase 41 38 - 126 U/L    Total Protein 5.4 (L) 6.1 - 8.0 g/dL    Albumin 2.2 (L) 3.5 - 5.1 g/dL    Total Bilirubin 0.9 0.3 - 1.2 mg/dL    ALT 36 11 - 66 U/L   Magnesium   Result Value Ref Range    Magnesium 1.5 (L) 1.6 - 2.4 mg/dL   CK   Result Value Ref Range    Total CK 1,063 (H) 55 - 170 U/L   Calcium, Ionized   Result Value Ref Range    Calcium, Ion 1.10 (L) 1.12 - 1.32 mmol/L   Phosphorus   Result Value Ref Range    Phosphorus 2.2 (L) 2.4 - 4.7 mg/dL   CBC   Result Value Ref Range    WBC 13.4 (H) 4.8 - 10.8 thou/mm3    RBC 3.02 (L) 4.70 - 6.10 mill/mm3    Hemoglobin 10.6 (L) 14.0 - 18.0 gm/dl    Hematocrit 30.0 (L) 42.0 - 52.0 %    MCV 99.3 (H) 80.0 - 94.0 fL    MCH 35.1 (H) 26.0 - 33.0 pg    MCHC 35.3 32.2 - 35.5 gm/dl    RDW-CV 13.3 11.5 - 14.5 %    RDW-SD 48.9 (H) 35.0 - 45.0 fL    Platelets 094 440 - 067 thou/mm3    MPV 10.1 9.4 - 12.4 fL   Vancomycin, Random   Result Value Ref Range    Vancomycin Rm 9.2 0.1 - 39.9 ug/mL   Anion Gap   Result Value Ref Range    Anion Gap 9.0 8.0 - 16.0 meq/L   Glomerular Filtration Rate, Estimated   Result Value Ref Range    Est, Glom Filt Rate >90 ml/min/1.73m2 Protime-INR   Result Value Ref Range    INR 1.56 (H) 0.85 - 1.13   CBC   Result Value Ref Range    WBC 9.3 4.8 - 10.8 thou/mm3    RBC 3.02 (L) 4.70 - 6.10 mill/mm3    Hemoglobin 10.6 (L) 14.0 - 18.0 gm/dl    Hematocrit 30.4 (L) 42.0 - 52.0 %    .7 (H) 80.0 - 94.0 fL    MCH 35.1 (H) 26.0 - 33.0 pg    MCHC 34.9 32.2 - 35.5 gm/dl    RDW-CV 13.5 11.5 - 14.5 %    RDW-SD 50.1 (H) 35.0 - 45.0 fL    Platelets 087 600 - 771 thou/mm3    MPV 9.9 9.4 - 12.4 fL   Magnesium   Result Value Ref Range    Magnesium 1.6 1.6 - 2.4 mg/dL   Comprehensive Metabolic Panel   Result Value Ref Range    Potassium 3.1 (L) 3.5 - 5.2 meq/L    Glucose 102 70 - 108 mg/dL    CREATININE 0.6 0.4 - 1.2 mg/dL    BUN 5 (L) 7 - 22 mg/dL    Sodium 129 (L) 135 - 145 meq/L    Chloride 96 (L) 98 - 111 meq/L    CO2 22 (L) 23 - 33 meq/L    Calcium 8.2 (L) 8.5 - 10.5 mg/dL    AST 66 (H) 5 - 40 U/L    Alkaline Phosphatase 42 38 - 126 U/L    Total Protein 5.7 (L) 6.1 - 8.0 g/dL    Albumin 2.4 (L) 3.5 - 5.1 g/dL    Total Bilirubin 0.8 0.3 - 1.2 mg/dL    ALT 38 11 - 66 U/L   Anion Gap   Result Value Ref Range    Anion Gap 11.0 8.0 - 16.0 meq/L   Glomerular Filtration Rate, Estimated   Result Value Ref Range    Est, Glom Filt Rate >90 ml/min/1.73m2   Magnesium   Result Value Ref Range    Magnesium 1.9 1.6 - 2.4 mg/dL   Microalbumin / Creatinine Urine Ratio   Result Value Ref Range    Microalbumin, Random Urine < 1.20 mg/dL    Creatinine, Urine 105.1 mg/dL    Microalb/Creat Ratio 11 0 - 30 mg/g   Comprehensive Metabolic Panel   Result Value Ref Range    Potassium 3.3 (L) 3.5 - 5.2 meq/L    Glucose 89 70 - 108 mg/dL    CREATININE 0.6 0.4 - 1.2 mg/dL    BUN 6 (L) 7 - 22 mg/dL    Sodium 131 (L) 135 - 145 meq/L    Chloride 100 98 - 111 meq/L    CO2 20 (L) 23 - 33 meq/L    Calcium 8.2 (L) 8.5 - 10.5 mg/dL    AST 46 (H) 5 - 40 U/L    Alkaline Phosphatase 38 38 - 126 U/L    Total Protein 5.6 (L) 6.1 - 8.0 g/dL    Albumin 2.5 (L) 3.5 - 5.1 g/dL    Total Bilirubin 0.8 0.3 - 1.2 mg/dL    ALT 31 11 - 66 U/L   CBC   Result Value Ref Range    WBC 5.8 4.8 - 10.8 thou/mm3    RBC 2.89 (L) 4.70 - 6.10 mill/mm3    Hemoglobin 10.2 (L) 14.0 - 18.0 gm/dl    Hematocrit 29.3 (L) 42.0 - 52.0 %    .4 (H) 80.0 - 94.0 fL    MCH 35.3 (H) 26.0 - 33.0 pg    MCHC 34.8 32.2 - 35.5 gm/dl    RDW-CV 13.8 11.5 - 14.5 %    RDW-SD 51.4 (H) 35.0 - 45.0 fL    Platelets 061 591 - 734 thou/mm3    MPV 9.9 9.4 - 12.4 fL   Anion Gap   Result Value Ref Range    Anion Gap 11.0 8.0 - 16.0 meq/L   Glomerular Filtration Rate, Estimated   Result Value Ref Range    Est, Glom Filt Rate >90 ml/min/1.73m2   CBC   Result Value Ref Range    WBC 4.8 4.8 - 10.8 thou/mm3    RBC 2.92 (L) 4.70 - 6.10 mill/mm3    Hemoglobin 10.0 (L) 14.0 - 18.0 gm/dl    Hematocrit 29.9 (L) 42.0 - 52.0 %    .4 (H) 80.0 - 94.0 fL    MCH 34.2 (H) 26.0 - 33.0 pg    MCHC 33.4 32.2 - 35.5 gm/dl    RDW-CV 13.9 11.5 - 14.5 %    RDW-SD 52.1 (H) 35.0 - 45.0 fL    Platelets 059 065 - 111 thou/mm3    MPV 10.5 9.4 - 12.4 fL   Comprehensive Metabolic Panel   Result Value Ref Range    Glucose 87 70 - 108 mg/dL    CREATININE 0.6 0.4 - 1.2 mg/dL    BUN 7 7 - 22 mg/dL    Sodium 131 (L) 135 - 145 meq/L    Potassium 2.9 (L) 3.5 - 5.2 meq/L    Chloride 99 98 - 111 meq/L    CO2 20 (L) 23 - 33 meq/L    Calcium 7.9 (L) 8.5 - 10.5 mg/dL    AST 39 5 - 40 U/L    Alkaline Phosphatase 35 (L) 38 - 126 U/L    Total Protein 5.6 (L) 6.1 - 8.0 g/dL    Albumin 2.4 (L) 3.5 - 5.1 g/dL    Total Bilirubin 0.5 0.3 - 1.2 mg/dL    ALT 26 11 - 66 U/L   Magnesium   Result Value Ref Range    Magnesium 1.7 1.6 - 2.4 mg/dL   Calcium, Ionized   Result Value Ref Range    Calcium, Ion 1.04 (L) 1.12 - 1.32 mmol/L   Phosphorus   Result Value Ref Range    Phosphorus 2.6 2.4 - 4.7 mg/dL   Anion Gap   Result Value Ref Range    Anion Gap 12.0 8.0 - 16.0 meq/L   Glomerular Filtration Rate, Estimated   Result Value Ref Range    Est, Glom Filt Rate >90 ml/min/1.73m2   Magnesium   Result Value Ref Range    Magnesium 1.6 1.6 - 2.4 mg/dL   Basic Metabolic Panel   Result Value Ref Range    Sodium 131 (L) 135 - 145 meq/L    Potassium 2.8 (L) 3.5 - 5.2 meq/L    Chloride 100 98 - 111 meq/L    CO2 20 (L) 23 - 33 meq/L    Glucose 93 70 - 108 mg/dL    BUN 6 (L) 7 - 22 mg/dL    CREATININE 0.5 0.4 - 1.2 mg/dL    Calcium 7.8 (L) 8.5 - 10.5 mg/dL   CBC Auto Differential   Result Value Ref Range    WBC 3.6 (L) 4.8 - 10.8 thou/mm3    RBC 2.91 (L) 4.70 - 6.10 mill/mm3    Hemoglobin 10.0 (L) 14.0 - 18.0 gm/dl    Hematocrit 29.6 (L) 42.0 - 52.0 %    .7 (H) 80.0 - 94.0 fL    MCH 34.4 (H) 26.0 - 33.0 pg    MCHC 33.8 32.2 - 35.5 gm/dl    RDW-CV 13.9 11.5 - 14.5 %    RDW-SD 51.8 (H) 35.0 - 45.0 fL    Platelets 031 809 - 590 thou/mm3    MPV 10.7 9.4 - 12.4 fL    Seg Neutrophils 60.1 %    Lymphocytes 22.2 %    Monocytes 11.6 %    Eosinophils 3.3 %    Basophils 1.1 %    Immature Granulocytes 1.7 %    Platelet Estimate ADEQUATE Adequate    Segs Absolute 2.2 1.8 - 7.7 thou/mm3    Lymphocytes Absolute 0.8 (L) 1.0 - 4.8 thou/mm3    Monocytes Absolute 0.4 0.4 - 1.3 thou/mm3    Eosinophils Absolute 0.1 0.0 - 0.4 thou/mm3    Basophils Absolute 0.0 0.0 - 0.1 thou/mm3    Immature Grans (Abs) 0.06 0.00 - 0.07 thou/mm3    nRBC 0 /100 wbc   Anion Gap   Result Value Ref Range    Anion Gap 11.0 8.0 - 16.0 meq/L   Glomerular Filtration Rate, Estimated   Result Value Ref Range    Est, Glom Filt Rate >90 ml/min/1.73m2   Scan of Blood Smear   Result Value Ref Range    SCAN OF BLOOD SMEAR see below    Iron   Result Value Ref Range    Iron 21 (L) 65 - 195 ug/dL   Iron Binding Capacity   Result Value Ref Range    TIBC 117 (L) 171 - 450 ug/dL   IRON SATURATION   Result Value Ref Range    Iron Saturation 18 (L) 20 - 50 %   Reticulocytes   Result Value Ref Range    Retic Ct Abs 1.2 0.5 - 2.0 %    Absolute Retic # 35.0 20.0 - 115.0 thou/mm3    Immature Retic Fract 7.3 2.3 - 13.4 %    Retic Hemoglobin 30.1 28.2 - 35.7 pg   Ferritin   Result Value Ref Range    Ferritin 806 (H) 22 - 322 ng/mL   Basic Metabolic Panel   Result Value Ref Range    Sodium 131 (L) 135 - 145 meq/L    Potassium 3.3 (L) 3.5 - 5.2 meq/L    Chloride 100 98 - 111 meq/L    CO2 21 (L) 23 - 33 meq/L    Glucose 107 70 - 108 mg/dL    BUN 4 (L) 7 - 22 mg/dL    CREATININE 0.5 0.4 - 1.2 mg/dL    Calcium 7.8 (L) 8.5 - 10.5 mg/dL   CBC Auto Differential   Result Value Ref Range    WBC 3.0 (L) 4.8 - 10.8 thou/mm3    RBC 2.73 (L) 4.70 - 6.10 mill/mm3    Hemoglobin 9.4 (L) 14.0 - 18.0 gm/dl    Hematocrit 27.8 (L) 42.0 - 52.0 %    .8 (H) 80.0 - 94.0 fL    MCH 34.4 (H) 26.0 - 33.0 pg    MCHC 33.8 32.2 - 35.5 gm/dl    RDW-CV 14.4 11.5 - 14.5 %    RDW-SD 54.2 (H) 35.0 - 45.0 fL    Platelets 079 213 - 016 thou/mm3    MPV 10.4 9.4 - 12.4 fL    Seg Neutrophils 57.4 %    Lymphocytes 24.4 %    Monocytes 11.2 %    Eosinophils 3.6 %    Basophils 1.7 %    Immature Granulocytes 1.7 %    Atypical Lymphocytes RARE %    Platelet Estimate ADEQUATE Adequate    Segs Absolute 1.7 (L) 1.8 - 7.7 thou/mm3    Lymphocytes Absolute 0.7 (L) 1.0 - 4.8 thou/mm3    Monocytes Absolute 0.3 (L) 0.4 - 1.3 thou/mm3    Eosinophils Absolute 0.1 0.0 - 0.4 thou/mm3    Basophils Absolute 0.1 0.0 - 0.1 thou/mm3    Immature Grans (Abs) 0.05 0.00 - 0.07 thou/mm3    nRBC 0 /100 wbc    BASOPHILIA 1+ Absent   Potassium   Result Value Ref Range    Potassium 3.5 3.5 - 5.2 meq/L   Anion Gap   Result Value Ref Range    Anion Gap 10.0 8.0 - 16.0 meq/L   Glomerular Filtration Rate, Estimated   Result Value Ref Range    Est, Glom Filt Rate >90 ml/min/1.73m2   Scan of Blood Smear   Result Value Ref Range    SCAN OF BLOOD SMEAR see below    CK   Result Value Ref Range    Total CK 75 55 - 170 U/L   Basic Metabolic Panel   Result Value Ref Range    Sodium 131 (L) 135 - 145 meq/L    Potassium 3.1 (L) 3.5 - 5.2 meq/L    Chloride 103 98 - 111 meq/L    CO2 21 (L) 23 - 33 meq/L    Glucose 101 70 - 108 mg/dL    BUN 4 (L) 7 - 22 mg/dL    CREATININE 0.6 0.4 - 1.2 mg/dL    Calcium 7.8 (L) 8.5 - 10.5 mg/dL   CBC Auto Differential   Result Value Ref Range    WBC 2.3 (L) 4.8 - 10.8 thou/mm3    RBC 2.62 (L) 4.70 - 6.10 mill/mm3    Hemoglobin 9.0 (L) 14.0 - 18.0 gm/dl    Hematocrit 26.1 (L) 42.0 - 52.0 %    MCV 99.6 (H) 80.0 - 94.0 fL    MCH 34.4 (H) 26.0 - 33.0 pg    MCHC 34.5 32.2 - 35.5 gm/dl    RDW-CV 14.4 11.5 - 14.5 %    RDW-SD 52.6 (H) 35.0 - 45.0 fL    Platelets 738 913 - 558 thou/mm3    MPV 10.8 9.4 - 12.4 fL    Seg Neutrophils 58.4 %    Lymphocytes 27.9 %    Monocytes 9.0 %    Eosinophils 1.7 %    Basophils 1.7 %    Immature Granulocytes 1.3 %    Platelet Estimate ADEQUATE Adequate    Segs Absolute 1.3 (L) 1.8 - 7.7 thou/mm3    Lymphocytes Absolute 0.6 (L) 1.0 - 4.8 thou/mm3    Monocytes Absolute 0.2 (L) 0.4 - 1.3 thou/mm3    Eosinophils Absolute 0.0 0.0 - 0.4 thou/mm3    Basophils Absolute 0.0 0.0 - 0.1 thou/mm3    Immature Grans (Abs) 0.03 0.00 - 0.07 thou/mm3    nRBC 0 /100 wbc    [place] SLIGHT Absent   Anion Gap   Result Value Ref Range    Anion Gap 7.0 (L) 8.0 - 16.0 meq/L   Glomerular Filtration Rate, Estimated   Result Value Ref Range    Est, Glom Filt Rate >90 ml/min/1.73m2   Scan of Blood Smear   Result Value Ref Range    SCAN OF BLOOD SMEAR see below    Basic Metabolic Panel   Result Value Ref Range    Sodium 132 (L) 135 - 145 meq/L    Potassium 2.9 (L) 3.5 - 5.2 meq/L    Chloride 101 98 - 111 meq/L    CO2 20 (L) 23 - 33 meq/L    Glucose 94 70 - 108 mg/dL    BUN 4 (L) 7 - 22 mg/dL    CREATININE 0.5 0.4 - 1.2 mg/dL    Calcium 7.8 (L) 8.5 - 10.5 mg/dL   CBC Auto Differential   Result Value Ref Range    WBC 2.0 (L) 4.8 - 10.8 thou/mm3    RBC 2.71 (L) 4.70 - 6.10 mill/mm3    Hemoglobin 9.4 (L) 14.0 - 18.0 gm/dl    Hematocrit 27.4 (L) 42.0 - 52.0 %    .1 (H) 80.0 - 94.0 fL    MCH 34.7 (H) 26.0 - 33.0 pg    MCHC 34.3 32.2 - 35.5 gm/dl    RDW-CV 14.6 (H) 11.5 - 14.5 %    RDW-SD 54.1 (H) 35.0 - 45.0 fL    Platelets 624 461 - 286 thou/mm3    MPV 11.0 9.4 - 12.4 fL    Seg Neutrophils 47.0 %    Lymphocytes 39.4 %    Monocytes 10.6 %    Eosinophils 1.0 %    Basophils 1.0 %    Immature Granulocytes 1.0 %    Atypical Lymphocytes RARE %    Platelet Estimate ADEQUATE Adequate    Segs Absolute 0.9 (L) 1.8 - 7.7 thou/mm3    Lymphocytes Absolute 0.8 (L) 1.0 - 4.8 thou/mm3    Monocytes Absolute 0.2 (L) 0.4 - 1.3 thou/mm3    Eosinophils Absolute 0.0 0.0 - 0.4 thou/mm3    Basophils Absolute 0.0 0.0 - 0.1 thou/mm3    Immature Grans (Abs) 0.02 0.00 - 0.07 thou/mm3    nRBC 0 /100 wbc    [place] SLIGHT Absent   Anion Gap   Result Value Ref Range    Anion Gap 11.0 8.0 - 16.0 meq/L   Glomerular Filtration Rate, Estimated   Result Value Ref Range    Est, Glom Filt Rate >90 ml/min/1.73m2   Scan of Blood Smear   Result Value Ref Range    SCAN OF BLOOD SMEAR see below    Magnesium   Result Value Ref Range    Magnesium 1.4 (L) 1.6 - 2.4 mg/dL   Potassium   Result Value Ref Range    Potassium 3.2 (L) 3.5 - 5.2 meq/L   Basic Metabolic Panel   Result Value Ref Range    Sodium 135 135 - 145 meq/L    Potassium 3.5 3.5 - 5.2 meq/L    Chloride 105 98 - 111 meq/L    CO2 21 (L) 23 - 33 meq/L    Glucose 108 70 - 108 mg/dL    BUN 5 (L) 7 - 22 mg/dL    CREATININE 0.5 0.4 - 1.2 mg/dL    Calcium 8.3 (L) 8.5 - 10.5 mg/dL   CBC Auto Differential   Result Value Ref Range    WBC 3.0 (L) 4.8 - 10.8 thou/mm3    RBC 3.06 (L) 4.70 - 6.10 mill/mm3    Hemoglobin 10.3 (L) 14.0 - 18.0 gm/dl    Hematocrit 31.0 (L) 42.0 - 52.0 %    .3 (H) 80.0 - 94.0 fL    MCH 33.7 (H) 26.0 - 33.0 pg    MCHC 33.2 32.2 - 35.5 gm/dl    RDW-CV 14.6 (H) 11.5 - 14.5 %    RDW-SD 54.9 (H) 35.0 - 45.0 fL    Platelets 296 424 - 396 thou/mm3    MPV 10.9 9.4 - 12.4 fL    Seg Neutrophils 26.0 %    Lymphocytes 55.3 %    Monocytes 14.0 %    Eosinophils 3.0 %    Basophils 1.0 %    Immature Granulocytes 0.7 %    Atypical Lymphocytes RARE %    Platelet Estimate ADEQUATE Adequate    Segs Absolute 0.8 (L) 1.8 - 7.7 thou/mm3    Lymphocytes Absolute 1.7 1.0 - 4.8 thou/mm3    Monocytes Absolute 0.4 0.4 - 1.3 thou/mm3    Eosinophils Absolute 0.1 0.0 - 0.4 thou/mm3    Basophils Absolute 0.0 0.0 - 0.1 thou/mm3    Immature Grans (Abs) 0.02 0.00 - 0.07 thou/mm3    nRBC 0 /100 wbc   Scan of Blood Smear   Result Value Ref Range    SCAN OF BLOOD SMEAR see below    Anion Gap   Result Value Ref Range    Anion Gap 9.0 8.0 - 16.0 meq/L   Glomerular Filtration Rate, Estimated   Result Value Ref Range    Est, Glom Filt Rate >90 VC/MPJ/8.31M7   Basic Metabolic Panel   Result Value Ref Range    Sodium 135 135 - 145 meq/L    Potassium 3.0 (L) 3.5 - 5.2 meq/L    Chloride 104 98 - 111 meq/L    CO2 21 (L) 23 - 33 meq/L    Glucose 88 70 - 108 mg/dL    BUN 4 (L) 7 - 22 mg/dL    CREATININE 0.4 0.4 - 1.2 mg/dL    Calcium 8.2 (L) 8.5 - 10.5 mg/dL   CBC Auto Differential   Result Value Ref Range    WBC 4.1 (L) 4.8 - 10.8 thou/mm3    RBC 2.98 (L) 4.70 - 6.10 mill/mm3    Hemoglobin 10.1 (L) 14.0 - 18.0 gm/dl    Hematocrit 30.0 (L) 42.0 - 52.0 %    .7 (H) 80.0 - 94.0 fL    MCH 33.9 (H) 26.0 - 33.0 pg    MCHC 33.7 32.2 - 35.5 gm/dl    RDW-CV 14.6 (H) 11.5 - 14.5 %    RDW-SD 54.4 (H) 35.0 - 45.0 fL    Platelets 779 131 - 044 thou/mm3    MPV 10.9 9.4 - 12.4 fL    Seg Neutrophils 36.3 %    Lymphocytes 47.3 %    Monocytes 13.5 %    Eosinophils 1.7 %    Basophils 0.7 %    Immature Granulocytes 0.5 %    Segs Absolute 1.5 (L) 1.8 - 7.7 thou/mm3    Lymphocytes Absolute 1.9 1.0 - 4.8 thou/mm3    Monocytes Absolute 0.6 0.4 - 1.3 thou/mm3    Eosinophils Absolute 0.1 0.0 - 0.4 thou/mm3    Basophils Absolute 0.0 0.0 - 0.1 thou/mm3    Immature Grans (Abs) 0.02 0.00 - 0.07 thou/mm3    nRBC 0 /100 wbc   Anion Gap   Result Value Ref Range    Anion Gap 10.0 8.0 - 16.0 meq/L   Glomerular Filtration Rate, Estimated   Result Value Ref Range    Est, Glom Filt Rate >90 AS/LYI/8.86E4   Basic Metabolic Panel   Result Value Ref Range    Sodium 139 135 - 145 meq/L    Potassium 3.5 3.5 - 5.2 meq/L Chloride 107 98 - 111 meq/L    CO2 19 (L) 23 - 33 meq/L    Glucose 107 70 - 108 mg/dL    BUN 3 (L) 7 - 22 mg/dL    CREATININE 0.4 0.4 - 1.2 mg/dL    Calcium 8.4 (L) 8.5 - 10.5 mg/dL   CBC Auto Differential   Result Value Ref Range    WBC 4.8 4.8 - 10.8 thou/mm3    RBC 2.88 (L) 4.70 - 6.10 mill/mm3    Hemoglobin 9.9 (L) 14.0 - 18.0 gm/dl    Hematocrit 29.6 (L) 42.0 - 52.0 %    .8 (H) 80.0 - 94.0 fL    MCH 34.4 (H) 26.0 - 33.0 pg    MCHC 33.4 32.2 - 35.5 gm/dl    RDW-CV 14.7 (H) 11.5 - 14.5 %    RDW-SD 55.9 (H) 35.0 - 45.0 fL    Platelets 196 549 - 727 thou/mm3    MPV 10.6 9.4 - 12.4 fL    Seg Neutrophils 41.7 %    Lymphocytes 40.0 %    Monocytes 15.0 %    Eosinophils 2.1 %    Basophils 0.8 %    Immature Granulocytes 0.4 %    Segs Absolute 2.0 1.8 - 7.7 thou/mm3    Lymphocytes Absolute 1.9 1.0 - 4.8 thou/mm3    Monocytes Absolute 0.7 0.4 - 1.3 thou/mm3    Eosinophils Absolute 0.1 0.0 - 0.4 thou/mm3    Basophils Absolute 0.0 0.0 - 0.1 thou/mm3    Immature Grans (Abs) 0.02 0.00 - 0.07 thou/mm3    nRBC 0 /100 wbc   Potassium   Result Value Ref Range    Potassium 3.6 3.5 - 5.2 meq/L   Anion Gap   Result Value Ref Range    Anion Gap 13.0 8.0 - 16.0 meq/L   Glomerular Filtration Rate, Estimated   Result Value Ref Range    Est, Glom Filt Rate >90 ml/min/1.73m2       Diet:  ADULT DIET; Regular  ADULT ORAL NUTRITION SUPPLEMENT; Breakfast, Dinner;  Wound Healing Oral Supplement  ADULT ORAL NUTRITION SUPPLEMENT; Breakfast, Lunch, Dinner; Frozen Oral Supplement    Activity:  Activity as tolerated (Patient may move about with assist as indicated or with supervision.)    Follow-up: with Podiatry and PCP     Disposition: SNF    Condition: Stable      Time Spent: 45 minutes    Electronically signed by SUSI Meyer CNP on 1/24/2022 at 12:55 PM    Discharging Hospitalist

## 2022-01-24 NOTE — PROGRESS NOTES
Hospitalist Progress Note      Patient:  Ivett Turk    Unit/Bed:7K-27/027-A  YOB: 1960  MRN: [id number]   Acct: [de-identified]   PCP: No primary care provider on file. Date of Admission: 1/10/2022      Chief Complaint:- Foot pain     Assessment and Plan:  1. Bilateral LE Ulcers/Eschar/?Frostbite on RLE: Patient presents with what appears to be frostbite of the right foot as well as a full-thickness ulceration of the left foot. Patient is afebrile however leukocytosis was present on arrival.  All digits of the right foot appear necrotic, foul-smelling and gangrenous which extends to the metatarsal level. The left foot has an open ulcer on the dorsal aspect near the second digit however does not appear to be necrotic appearing as the right foot. CTA of the abdominal aorta with bilateral runoff shows an area of significant narrowing in the distal right superficial femoral artery with what appears to be relatively normal flow in the left LE circulation. Podiatry consulted and performing daily wound care/Betadine debridement. MRI 1/13/2022 reveals what appears to be osteomyelitis in the first digit and metatarsal and soft tissue gas as noted by radiologist.  Patient underwent RLE arteriogram with angioplasty on 1/17/2022 by IR. ? As per podiatry, plan for eventual TMA as outpatient. No indication for immediate amputation at this time. ? Continue Augmentin for 7 days. No further indication for pseudomonal coverage given length of therapy with Zosyn. 2. Essential Hypertension Uncontrolled: Patient reports having history of hypertension but noncompliant with medications prescribed in the past. Blood pressures in the 707F systolic overnight. Patient has received several doses of IV lopressor during this admission. Will add daily low dose Lisinopril to begin tomorrow morning.    3. 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 Iron studies done suggest inflammatory anemia. Benign reticulocyte count, however patient has been having a drop in his chronic macrocytic hemoglobin compared to his usual baseline. Leukopenia has resolved. Hbg is stable at 9.9 at last draw. Will repeat labs in the morning. 4. History of EtOH Abuse: Patient no longer agitated as seen in alcohol abuse. 5. Diffuse Alcoholic Fatty Liver Disease without Cirrhosis: Evidence for acute EtOH toxic insult due to mild transaminitis with AST > ALT. Both now downtrending, indicating recovery from EtOH toxic insult. INR 1.56. Platelet count greater than 150,000. Ultrasound of the liver shows fatty infiltration with concurrent hypoechogenicity, normal color flow via Doppler studies. No CBD dilatation. Patient at risk for developing fibrotic changes with continued drinking and eventual cirrhosis. 6. Severe Hypoalbuminemia: Due to severe malnutrition and chronic EtOH use.   ? IV albumin x3 doses given on 1/12-1/13/2022   ? Dietitian following  7. Moderate Acute on Chronic Asymptomatic Hypovolemic Hyponatremia, resolved. ? Stop IVF  8. Mild Rhabdomyolysis: Resolved. Initial H and P:-    72-year-old homeless male with PMH of EtOH and cocaine abuse who came to the ED on 1/10/2022 due to bilateral lower extremity pain and numbness. On arrival patient was noted to be an extremely poor historian. Patient did state that he has had lower extremity numbness and pain for about 2 weeks. Not sure how long he has had ulcers in the foot. Admits to drinking every day, about 6 drinks per day for several years. Denied any chronic medical problems. Physical exam in the ED showed bilateral lower extremity eschars along with ulcers and discolored toes. Admission labs showed multiple significant abnormalities which included hyponatremia, hypokalemia, hyperbilirubinemia leukocytosis, macrocytic anemia, rhabdomyolysis.   Patient admitted for these bilateral ulcers/eschar and multiple lab abnormalities. Subjective (past 24 hours):   Patient is resting in bed comfortably. He reports mild pain in his lower back and right foot, worse with dressing changes. He has been taking Norco PRN with minimal relief. Patient has concerns about plans for discharge. Per case management note, [facility] reviewing today for possible admission. Medications:  Reviewed    Infusion Medications    sodium chloride       Scheduled Medications    [START ON 1/25/2022] lisinopril  5 mg Oral Daily    amoxicillin-clavulanate  1 tablet Oral 2 times per day    clopidogrel  75 mg Oral Daily    nicotine  1 patch TransDERmal Daily    phosphorus replacement protocol   Other RX Placeholder    calcium replacement protocol   Other RX Placeholder    sodium chloride flush  5-40 mL IntraVENous 2 times per day    enoxaparin  40 mg SubCUTAneous Q24H    thiamine  100 mg Oral Daily    multivitamin  1 tablet Oral Daily     PRN Meds: guaiFENesin-dextromethorphan, metoprolol, melatonin, hydrOXYzine, haloperidol lactate, HYDROcodone 5 mg - acetaminophen, povidone-iodine, sodium chloride flush, sodium chloride, ondansetron **OR** ondansetron, polyethylene glycol, acetaminophen **OR** acetaminophen, potassium chloride **OR** potassium alternative oral replacement **OR** potassium chloride, magnesium sulfate, LORazepam **OR** LORazepam **OR** LORazepam **OR** LORazepam **OR** LORazepam **OR** LORazepam **OR** LORazepam **OR** LORazepam      Intake/Output Summary (Last 24 hours) at 1/24/2022 0956  Last data filed at 1/24/2022 0844  Gross per 24 hour   Intake 1170 ml   Output 2900 ml   Net -1730 ml       Diet:  ADULT DIET; Regular  ADULT ORAL NUTRITION SUPPLEMENT; Breakfast, Dinner;  Wound Healing Oral Supplement  ADULT ORAL NUTRITION SUPPLEMENT; Breakfast, Lunch, Dinner; Frozen Oral Supplement    Exam:  BP (!) 147/71   Pulse 71   Temp 98 °F (36.7 °C) (Oral)   Resp 16   Ht 6' (1.829 m)   Wt 140 lb (63.5 kg)   SpO2 96%   BMI 18.99 kg/m²   General appearance: No apparent distress, chronically ill appearing  HEENT: Pupils equal, round, and reactive to light. Conjunctivae/corneas clear. Neck: Supple, with full range of motion. No jugular venous distention. Trachea midline. Respiratory:  Normal respiratory effort. Clear to auscultation  Cardiovascular: Regular rate and rhythm without murmurs, rubs or gallops. Abdomen: Soft, non-tender, non-distended with normal bowel sounds. Extremities: Bilateral feet wrapped with kerlix dressing, left CDI, right, beadine soaked at toes. Skin: Skin color, texture, turgor normal.  No rashes or lesions. Neurologic:  No focal deficits. Psychiatric: Alert and oriented, concerns about placement at discharge  Capillary Refill: delayed      Labs:   Recent Labs     01/22/22 0426 01/23/22  0547   WBC 4.1* 4.8   HGB 10.1* 9.9*   HCT 30.0* 29.6*    270     Recent Labs     01/22/22 0426 01/22/22 1922 01/23/22  0547     --  139   K 3.0* 3.6 3.5     --  107   CO2 21*  --  19*   BUN 4*  --  3*   CREATININE 0.4  --  0.4   CALCIUM 8.2*  --  8.4*     No results for input(s): AST, ALT, BILIDIR, BILITOT, ALKPHOS in the last 72 hours. No results for input(s): INR in the last 72 hours. No results for input(s): Chyrel Lions in the last 72 hours. Microbiology:    Blood culture #1:   Lab Results   Component Value Date    BC No growth-preliminary No growth  01/10/2022       Blood culture #2:No results found for: Sharitaa Superior    Organism:  Lab Results   Component Value Date    ORG Staphylococcus aureus 01/11/2022    ORG Pseudomonas aeruginosa 01/11/2022    ORG Staphylococcus aureus 01/11/2022    ORG Pseudomonas aeruginosa 01/11/2022         Lab Results   Component Value Date    LABGRAM  01/11/2022     Rare segmented neutrophils observed. No epithelial cells observed. No bacteria seen.      LABGRAM  01/11/2022     Rare segmented neutrophils observed. No epithelial cells observed. No bacteria seen. MRSA culture only:No results found for: [facility]    Urine culture:   Lab Results   Component Value Date    LABURIN  01/10/2022     Mixed growth. The mixture of organisms present represents both organisms that may cause urinary tract infections and organisms that are not a common cause of urinary tract infections and are possibly skin cristhian or distal urethral cristhian. Respiratory culture: No results found for: CULTRESP    Aerobic and Anaerobic :  Lab Results   Component Value Date    LABAERO  01/11/2022     light growth In the treatment of gram positive infections, GENTAMICIN should be CONSIDERED a SYNERGYSTIC agent ONLY. Moxifloxacin, regardless of in vitro sensitivity, should not be used for staphylococcal infections. LABAERO light growth  01/11/2022    LABAERO  01/11/2022     light growth In the treatment of gram positive infections, GENTAMICIN should be CONSIDERED a SYNERGYSTIC agent ONLY. Moxifloxacin, regardless of in vitro sensitivity, should not be used for staphylococcal infections. LABAERO very light growth  01/11/2022     Lab Results   Component Value Date    LABANAE  01/11/2022     Culture yielded light mixed growth, including multiple colony types of anaerobic gram positive cocci. If a true mixed aerobic and anaerobic infection is suspected, then broad spectrum empiric antibiotic therapy is indicated and should include coverage for anaerobic organisms. LABANAE  01/11/2022     Culture yielded light growth of anaerobic gram positive cocci. If a true mixed aerobic and anaerobic infection is suspected, then broad spectrum empiric antibiotic therapy is indicated and should include coverage for anaerobic organisms.         Urinalysis:      Lab Results   Component Value Date    NITRU see below 01/10/2022    WBCUA 0-2 01/10/2022    BACTERIA NONE SEEN 01/10/2022    RBCUA 3-5 01/10/2022    BLOODU see below 01/10/2022    SPECGRAV see below 01/10/2022    GLUCOSEU NEGATIVE 09/01/2014       Radiology:  IR ANGIOGRAM EXTREMITY RIGHT   Final Result   Complete occlusion of the mid right SFA as well as multifocal stenosis throughout the SFA and popliteal artery, successfully treated with angioplasty with an excellent result. **This report has been created using voice recognition software. It may contain minor errors which are inherent in voice recognition technology. **                        Final report electronically signed by Dr Stormy Stein on 1/17/2022 6:36 PM      VL DUP LOWER EXTREMITY ARTERIES BILATERAL   Final Result   1. Normal findings in the left leg. 2. Abnormal findings in the right leg,. Findings suggestive of mild stenosis at the origin of the profunda and possibly at the origin right SFA. Total occlusion mid right SFA with distal collateral reconstitution. Also evidence for trifurcation disease    right side. 3. Aortofemoral angiography is recommended for further evaluation with possible intervention. If desired, this can be arranged through the interventional scheduling desk of [facility]'s radiology department ([phone]). **This report has been created using voice recognition software. It may contain minor errors which are inherent in voice recognition technology. **      Final report electronically signed by Dr. Peggy Evans on 1/14/2022 11:14 AM      US LIVER   Final Result       1. Increased echogenicity in the liver consistent with fatty infiltration. 2. There are gallstones, gallbladder wall thickening and pericholecystic fluid. 3. There is ascites adjacent to the left lobe of liver and fluid in the hepatorenal fossa. 4. Small area of abnormal echogenicity anterior to the inferior vena cava posterior to the liver. In correlation with previous CT angiogram, these findings may represent an abnormal hepatic flexure and ascending colon possibly related to colitis. Please    correlate clinically. Crow Vaughn are inherent in voice recognition technology. **      Final report electronically signed by Dr Fracisco Laws on 1/13/2022 11:52 AM      CTA ABDOMINAL AORTA W BILAT RUNOFF W WO CONTRAST   Final Result      1. Limited evaluation of the right leg secondary to patient motion artifact. 2. Relatively normal flow in the abdominal aorta, visceral arteries, right and left common, internal and external iliac arteries common femoral arteries. 3. There is an area of significant narrowing in the distal right superficial femoral artery. 4. There is relatively normal flow in the left common and superficial femoral, popliteal, anterior and posterior tibial arteries. 5. Intramedullary jaleesa in the right tibia-fibula. 6. Mild diffuse fatty replacement in the liver. 7. Punctate calcifications in spleen. 8. Small bilateral adrenal nodules. 9. Atrophic pancreas. Dilated pancreatic duct. 10. 2.2 cm fluid collection in the region of the splenic hilum. 11. Enlarged prostate gland. **This report has been created using voice recognition software. It may contain minor errors which are inherent in voice recognition technology. **      Final report electronically signed by DR Yajaira Montiel on 1/10/2022 9:10 PM      XR FOOT RIGHT (MIN 3 VIEWS)   Final Result       1. Postoperative changes in the distal tibia. 2. Abnormal density involving the neck of the fifth metacarpal.   3. Degenerative changes. 4. Diffuse osteopenia. 5. Soft tissue swelling over the dorsum of foot. .               **This report has been created using voice recognition software. It may contain minor errors which are inherent in voice recognition technology. **      Final report electronically signed by DR Yajaira Montiel on 1/10/2022 2:03 PM      XR FOOT LEFT (MIN 3 VIEWS)   Final Result       1.  Stable left foot radiographs, no interval change since previous study dated 30 January 2020.   2. Abnormal density involving the first metatarsophalangeal joint, unchanged. 3. Degenerative change. 4. No definite evidence of fracture, bony destruction or osteomyelitis. .               **This report has been created using voice recognition software. It may contain minor errors which are inherent in voice recognition technology. **      Final report electronically signed by DR Olga Gann on 1/10/2022 2:00 PM      IR INTERVENTIONAL RADIOLOGY PROCEDURE REQUEST    (Results Pending)   IR INTERVENTIONAL RADIOLOGY PROCEDURE REQUEST    (Results Pending)   VL DUP LOWER EXTREMITY ARTERIES BILATERAL    (Results Pending)     XR FOOT LEFT (MIN 3 VIEWS)    Result Date: 1/10/2022  PROCEDURE: XR FOOT LEFT (MIN 3 VIEWS) CLINICAL INFORMATION: frostbite. COMPARISON: Left foot radiographs dated 30 January 2020. TECHNIQUE: 4 views of the left foot. FINDINGS:  There is abnormal density involving the first metatarsophalangeal joint, unchanged since remote radiographs dated 30 January 2020. There is degenerative change. There is no fracture. There is no definite bony destruction noted. There is no radiographic evidence for osteomyelitis. The calcaneus is normal. The soft tissues are normal.      1. Stable left foot radiographs, no interval change since previous study dated 30 January 2020. 2. Abnormal density involving the first metatarsophalangeal joint, unchanged. 3. Degenerative change. 4. No definite evidence of fracture, bony destruction or osteomyelitis. . **This report has been created using voice recognition software. It may contain minor errors which are inherent in voice recognition technology. ** Final report electronically signed by DR Olga Gann on 1/10/2022 2:00 PM    XR FOOT RIGHT (MIN 3 VIEWS)    Result Date: 1/10/2022  PROCEDURE: XR FOOT RIGHT (MIN 3 VIEWS) CLINICAL INFORMATION: frostbite. COMPARISON: No prior study. TECHNIQUE: 4 views of the right foot. FINDINGS:  There are postoperative changes with an intramedullary jaleesa and screw in the distal tibia.  There is abnormal density involving the neck of the fifth metacarpal. There are degenerative changes. There is diffuse osteopenia. There is soft tissue swelling over the dorsum of the foot. There are small calcifications adjacent to the calcaneocuboid articulation. The remaining bony structures are intact. . The soft tissues are normal.      1. Postoperative changes in the distal tibia. 2. Abnormal density involving the neck of the fifth metacarpal. 3. Degenerative changes. 4. Diffuse osteopenia. 5. Soft tissue swelling over the dorsum of foot. . **This report has been created using voice recognition software. It may contain minor errors which are inherent in voice recognition technology. ** Final report electronically signed by DR Rajan Douglas on 1/10/2022 2:03 PM    CTA ABDOMINAL AORTA W BILAT RUNOFF W WO CONTRAST    Result Date: 1/10/2022  PROCEDURE: CTA ABDOMINAL AORTA W BILAT RUNOFF W WO CONTRAST CLINICAL INFORMATION: LE wound, eschar, low pulsus, PAD . COMPARISON: No prior study. TECHNIQUE: A noncontrast localizer was obtained. 3 mm axial images were obtained through the abdomen, pelvis and both lower extremities after the administration of intravenous contrast.  3D reconstructions were performed on a dedicated 3-D workstation to  include sagittal and coronal mid images through the vasculature. Centerline reconstructions were also obtained. All CT scans at this facility use dose modulation, iterative reconstruction, and/or weight-based dosing when appropriate to reduce radiation dose to as low as reasonably achievable. FINDINGS: CTA abdominal aorta and runoff : There is no abdominal aortic aneurysm. There is no dissection. The aorta is of normal caliber. The visualized aspects of the lung bases are clear. There is mild diffuse fatty replacement of the liver. . There are punctate calcifications in the spleen. There are small bilateral adrenal nodules. There is an atrophic pancreas disc dilatation of the pancreatic duct.  There is a 2.2 cm fluid collection in  the region of the splenic hilum. The gallbladder is normal.   There is no hydronephrosis or stones of either kidney. No contour deforming renal masses are noted. . No abnormalities of the small bowel loops are noted. The IVC and aorta are of normal caliber. There is no adenopathy. The bladder is normal. The prostate gland measures 4.2 x 3.6 cm in size There is no colonic inflammation. There is relatively normal flow in the abdominal aorta to right and single left renal arteries and superior mesenteric and celiac axis arteries, right and left common, internal and external iliac arteries right and left common and proximal superficial femoral arteries. There appears be an area of significant narrowing in the distal right superficial femoral artery. Evaluation of the right leg is limited by motion artifact. There is flow in the left common and superficial femoral, popliteal, anterior and posterior tibial arteries. There is an intramedullary jaleesa in the right tibia. 1. Limited evaluation of the right leg secondary to patient motion artifact. 2. Relatively normal flow in the abdominal aorta, visceral arteries, right and left common, internal and external iliac arteries common femoral arteries. 3. There is an area of significant narrowing in the distal right superficial femoral artery. 4. There is relatively normal flow in the left common and superficial femoral, popliteal, anterior and posterior tibial arteries. 5. Intramedullary jaleesa in the right tibia-fibula. 6. Mild diffuse fatty replacement in the liver. 7. Punctate calcifications in spleen. 8. Small bilateral adrenal nodules. 9. Atrophic pancreas. Dilated pancreatic duct. 10. 2.2 cm fluid collection in the region of the splenic hilum. 11. Enlarged prostate gland. **This report has been created using voice recognition software. It may contain minor errors which are inherent in voice recognition technology. ** Final report electronically signed by DR Rodriguez Eleva on 1/10/2022 9:10 PM      Electronically signed by SUSI Felipe CNP on 1/24/2022 at 9:56 AM

## 2022-01-24 NOTE — DISCHARGE INSTR - COC
Continuity of Care Form    Patient Name: Concepcion Rodriguez   :  1960  MRN:  [id number]    Admit date:  1/10/2022  Discharge date:  22    Code Status Order: Full Code   Advance Directives:      Admitting Physician:  No admitting provider for patient encounter. PCP: No primary care provider on file. Discharging Nurse: [facility] Unit/Room#: 7K-27/027-A  Discharging Unit Phone Number: [phone]    Emergency Contact:   Extended Emergency Contact Information  Primary Emergency Contact: [facility]  Address: [address] [facility]           [address] [place] [address], [address] [place] [address] Phone: [phone]  Work Phone: [phone]  Mobile Phone: [phone]  Relation: Brother/Sister    Past Surgical History:  Past Surgical History:   Procedure Laterality Date    LEG SURGERY         Immunization History:   Immunization History   Administered Date(s) Administered    Tdap (Boostrix, Adacel) 2017, 2018, 2021       Active Problems:  Patient Active Problem List   Diagnosis Code    Incarcerated left inguinal hernia K40.30    Tobacco dependence F17.200    Substance induced mood disorder ([facility] [address] F19.94    Foot pain, bilateral M79.671, M79.672    Wound infection T14. 8XXA, L08.9    Moderate malnutrition (HCC) E44.0       Isolation/Infection:   Isolation            Contact          Patient Infection Status       Infection Onset Added Last Indicated Last Indicated By Review Planned Expiration Resolved Resolved By    MRSA  14 Beverly Paulson RN        Resolved    COVID-19 (Rule Out) 01/10/22 01/10/22 01/10/22 COVID-19, Rapid (Ordered)   01/10/22 Rule-Out Test Resulted            Nurse Assessment:  Last Vital Signs: BP (!) 147/71   Pulse 71   Temp 98 °F (36.7 °C) (Oral)   Resp 16   Ht 6' (1.829 m)   Wt 140 lb (63.5 kg)   SpO2 96%   BMI 18.99 kg/m²     Last documented pain score (0-10 scale): Pain Level: 10  Last Weight:   Wt Readings from Last 1 Encounters: 01/10/22 140 lb (63.5 kg)     Mental Status:  oriented, alert, coherent, logical, thought processes intact, and able to concentrate and follow conversation    IV Access:  - None    Nursing Mobility/ADLs:  Walking   Assisted  Transfer  Independent  [address] Delivery   whole    Wound Care Documentation and Therapy:  Puncture 01/17/22 Femoral Anterior;Left;Proximal (Active)   Wound Assessment Dry; Intact 01/17/22 2238   Nelli-wound Assessment Dry;Clean; Intact 01/17/22 2238   Closure Other (Comment) 01/17/22 2238   Culture Taken No 01/17/22 2238   Drainage Amount Small 01/17/22 2238   Drainage Description Sanguinous 01/17/22 2238   Odor None 01/17/22 2238   Dressing/Treatment Dry dressing;Tegaderm/transparent film dressing 01/17/22 2238   Dressing Status Dry; Intact; Old drainage 01/17/22 2238   Number of days: 6       Wound 01/11/22 Toe (Comment  which one) Anterior;Right (Active)   Wound Etiology Other 01/21/22 0822   Dressing Status Dry; Intact 01/23/22 0808   Wound Cleansed Betadine/povidone iodine 01/21/22 0822   Dressing/Treatment Dry dressing 01/24/22 0842   Dressing Change Due 01/11/22 01/11/22 2315   Wound Assessment Other (Comment) 01/23/22 0808   Drainage Amount None 01/23/22 0808   Odor None 01/23/22 0808   Nelli-wound Assessment Other (Comment) 01/23/22 0808   Number of days: 13       Wound 01/11/22 Foot Anterior; Left (Active)   Wound Etiology Other 01/21/22 0822   Dressing Status Dry; Intact 01/22/22 1953   Wound Cleansed Betadine/povidone iodine 01/21/22 0822   Dressing/Treatment Dry dressing 01/24/22 0842   Wound Assessment Other (Comment) 01/23/22 0808   Drainage Amount None 01/23/22 0808   Drainage Description Other (Comment) 01/11/22 1450   Odor None 01/23/22 0808   Nelli-wound Assessment Other (Comment) 01/23/22 0808   Number of days: 13        Elimination:  Continence:    Bowel: Yes  Bladder: Yes  Urinary Catheter: None   Colostomy/Ileostomy/Ileal Conduit: No       Date of Last BM: 1/24/22    Intake/Output Summary (Last 24 hours) at 1/24/2022 1201  Last data filed at 1/24/2022 0844  Gross per 24 hour   Intake 1170 ml   Output 2700 ml   Net -1530 ml     I/O last 3 completed shifts: In: 4325.6 [P.O.:1842; I.V.:1875.1; IV Piggyback:608.5]  Out: 4600 [Urine:4600]    Safety Concerns:     None    Impairments/Disabilities:      None    Nutrition Therapy:  Current Nutrition Therapy:   - Oral Diet:  General    Routes of Feeding: None  Liquids: No Restrictions  Daily Fluid Restriction: no  Last Modified Barium Swallow with Video (Video Swallowing Test): not done    Treatments at the Time of Hospital Discharge:   Respiratory Treatments:     Oxygen Therapy:  is not on home oxygen therapy.   Ventilator:    - No ventilator support    Rehab Therapies: Physical Therapy and Occupational Therapy  Weight Bearing Status/Restrictions: No weight bearing restirctions  Other Medical Equipment (for information only, NOT a DME order):  Bilateral walking shoes  Other Treatments:       Patient's personal belongings (please select all that are sent with patient):      RN SIGNATURE:  Electronically signed by Sonny Fragoso RN on 1/24/22 at 12:04 PM EST    CASE MANAGEMENT/SOCIAL WORK SECTION    Inpatient Status Date: 01/10/22    Readmission Risk Assessment Score:  Readmission Risk              Risk of Unplanned Readmission:  23           Discharging to Facility/ Agency   Name: [facility]  Address: [address] [place] [address]  Phone: [phone]   Fax: [phone]    Dialysis Facility (if applicable)   Name:  Address:  Dialysis Schedule:  Phone:  Fax:    / signature: Electronically signed by TOD Cintron on 1/24/2022 at 3:02 PM     PHYSICIAN SECTION    Prognosis: Good    Condition at Discharge: Stable    Rehab Potential (if transferring to Rehab): Good    Recommended Labs or Other Treatments After Discharge: PT/OT, finish antibiotic course, start plavix and lisinopril, continue vitamins    Physician Certification: I certify the above information and transfer of Ivett Turk  is necessary for the continuing treatment of the diagnosis listed and that he requires [facility] for greater 30 days.      Update Admission H&P: No change in H&P    PHYSICIAN SIGNATURE:  Electronically signed by Terell Turk MD on 1/24/22 at 12:55 PM EST

## 2022-04-18 ENCOUNTER — HOSPITAL ENCOUNTER (OUTPATIENT)
Dept: INTERVENTIONAL RADIOLOGY/VASCULAR | Age: 62
Discharge: HOME | End: 2022-04-18
Payer: COMMERCIAL

## 2022-04-18 DIAGNOSIS — I73.9 PVD (PERIPHERAL VASCULAR DISEASE) (HCC): ICD-10-CM

## 2022-04-18 PROCEDURE — 93925 LOWER EXTREMITY STUDY: CPT

## 2022-04-18 PROCEDURE — 99211 OFF/OP EST MAY X REQ PHY/QHP: CPT

## 2022-05-12 ENCOUNTER — TELEPHONE (OUTPATIENT)
Dept: WOUND CARE | Age: 62
End: 2022-05-12

## 2022-05-12 NOTE — TELEPHONE ENCOUNTER
Vidal Husain from [facility] called regarding patient. Updated Vidal Husain that this patient has never been seen in the wound clinic.

## 2022-09-19 PROBLEM — M79.671 FOOT PAIN, BILATERAL: Status: RESOLVED | Noted: 2022-01-10 | Resolved: 2022-09-19

## 2022-09-19 PROBLEM — E44.0 MODERATE MALNUTRITION (HCC): Status: RESOLVED | Noted: 2022-01-19 | Resolved: 2022-09-19

## 2022-09-19 PROBLEM — M79.672 FOOT PAIN, BILATERAL: Status: RESOLVED | Noted: 2022-01-10 | Resolved: 2022-09-19

## 2022-09-20 RX ORDER — CYCLOBENZAPRINE HCL 10 MG
TABLET ORAL
COMMUNITY
Start: 2022-03-08

## 2022-09-20 RX ORDER — GABAPENTIN 300 MG/1
CAPSULE ORAL
COMMUNITY
Start: 2022-02-28 | End: 2022-09-26

## 2022-09-20 RX ORDER — ASPIRIN 81 MG/1
TABLET ORAL
COMMUNITY
Start: 2022-05-16

## 2022-09-20 RX ORDER — LIDOCAINE 50 MG/G
PATCH TOPICAL
COMMUNITY
Start: 2022-05-16

## 2022-09-20 RX ORDER — ACETAMINOPHEN 325 MG/1
TABLET ORAL
COMMUNITY
Start: 2022-03-08 | End: 2022-09-26

## 2022-09-26 ENCOUNTER — OFFICE VISIT (OUTPATIENT)
Dept: CARDIOTHORACIC SURGERY | Age: 62
End: 2022-09-26
Payer: COMMERCIAL

## 2022-09-26 VITALS
SYSTOLIC BLOOD PRESSURE: 133 MMHG | WEIGHT: 147.8 LBS | HEART RATE: 113 BPM | DIASTOLIC BLOOD PRESSURE: 69 MMHG | HEIGHT: 72 IN | BODY MASS INDEX: 20.02 KG/M2

## 2022-09-26 DIAGNOSIS — S91.302A OPEN WOUND OF LEFT FOOT, INITIAL ENCOUNTER: Primary | ICD-10-CM

## 2022-09-26 DIAGNOSIS — R09.89 BRUIT OF RIGHT CAROTID ARTERY: ICD-10-CM

## 2022-09-26 DIAGNOSIS — I70.245 ATHSCL NATIVE ARTERIES OF LEFT LEG W ULCERATION OTH PRT FOOT (HCC): ICD-10-CM

## 2022-09-26 PROCEDURE — 3017F COLORECTAL CA SCREEN DOC REV: CPT | Performed by: THORACIC SURGERY (CARDIOTHORACIC VASCULAR SURGERY)

## 2022-09-26 PROCEDURE — 4004F PT TOBACCO SCREEN RCVD TLK: CPT | Performed by: THORACIC SURGERY (CARDIOTHORACIC VASCULAR SURGERY)

## 2022-09-26 PROCEDURE — G8420 CALC BMI NORM PARAMETERS: HCPCS | Performed by: THORACIC SURGERY (CARDIOTHORACIC VASCULAR SURGERY)

## 2022-09-26 PROCEDURE — G8427 DOCREV CUR MEDS BY ELIG CLIN: HCPCS | Performed by: THORACIC SURGERY (CARDIOTHORACIC VASCULAR SURGERY)

## 2022-09-26 PROCEDURE — 99204 OFFICE O/P NEW MOD 45 MIN: CPT | Performed by: THORACIC SURGERY (CARDIOTHORACIC VASCULAR SURGERY)

## 2022-09-26 RX ORDER — CLOPIDOGREL BISULFATE 75 MG/1
75 TABLET ORAL DAILY
COMMUNITY

## 2022-09-26 RX ORDER — ACETAMINOPHEN 325 MG/1
650 TABLET ORAL EVERY 6 HOURS PRN
COMMUNITY

## 2022-09-26 RX ORDER — GABAPENTIN 300 MG/1
300 CAPSULE ORAL 3 TIMES DAILY
COMMUNITY

## 2022-09-26 ASSESSMENT — ENCOUNTER SYMPTOMS
GASTROINTESTINAL NEGATIVE: 1
BACK PAIN: 1
COLOR CHANGE: 1
RESPIRATORY NEGATIVE: 1

## 2022-09-26 NOTE — PROGRESS NOTES
[address] [facility] SURGERY  [address] [place] [address] [address]  Dept: [phone]  Dept Fax: [phone]: [phone]    Visit Date: 9/26/2022    Mr. Priscilla Sandoval is a 63 y.o.male  who presented for:  No chief complaint on file. HPI:   HPI   63year old male with PMHx of heavy tobacco abuse, ETOH abuse, and previous polysubstance abuse, PAD, and HTN, and poor social situation with homelessness, presents for evaluation of left foot pain and open chronic wound to the left forefoot. He is a very poor historian, and some history obtained in chart. He states that in late December 2021 or early January 2022, he had frostbite to the right forefoot. He presented to the ED here at [facility], but for some reason, he was sent to [facility], and referrred to Podiatry there. He reportedly had some debridements, but his forefoot gangrene progressed, and he ended up with a right TMA. This did not slow the progression of gangrene, so he then underwent a right BKA. He then was brought back to [facility], where he at some point in this worsening situation had a right leg angiogram, which revealed a right SFA occlusion. He underwent a right SFA intervention, but the gangrene kept progressing and the right BKA was reportedly non-viable, so he underwent a right AKA. Following this right AKA, the stump has healed, and has done well, and he has a prosthesis, but he doesn't use it and uses a wheelchair. He lives in a nursing home now, and over the past one month he has noticed a chronic non-healing wound to the left forefoot. The left forefoot wound is non-draining, but has exudate and no wound care is ongoing. No erythema, or cellulitis and nor is he on oral or IV ABX. No MRI or other studies to the foot.     Accompanying this left forefoot ulcer is rest pain, claudication of the left lower leg and calf and foot, numbness, paresthesias and severe coolness. He has had an arterial duplex earlier this year, which revealed a left popliteal high grade stenosis and also a right SFA occlusion. He continues to smoker unfortunately. He is on ASA/Plavix.         Current Outpatient Medications:     collagenase 250 UNIT/GM ointment, Apply topically daily Apply topically daily to left foot wound on dorsum of foot., Disp: , Rfl:     aspirin 81 MG EC tablet, Take by mouth, Disp: , Rfl:     lisinopril (PRINIVIL;ZESTRIL) 5 MG tablet, Take 1 tablet by mouth daily, Disp: 30 tablet, Rfl: 0    melatonin 3 MG TABS tablet, Take 2 tablets by mouth nightly as needed (sleep), Disp: 10 tablet, Rfl: 0    thiamine 100 MG tablet, Take 1 tablet by mouth daily, Disp: 30 tablet, Rfl: 0    Multiple Vitamin (MULTIVITAMIN) TABS tablet, Take 1 tablet by mouth daily, Disp: 30 tablet, Rfl: 0    cyclobenzaprine (FLEXERIL) 10 MG tablet, Take by mouth, Disp: , Rfl:     lidocaine (LIDODERM) 5 %, Apply topically (Patient not taking: Reported on 9/26/2022), Disp: , Rfl:     Menthol, Topical Analgesic, 4 % GEL, Apply topically, Disp: , Rfl:     pantoprazole (PROTONIX) 20 MG tablet, Take 2 tablets by mouth daily for 30 doses, Disp: 60 tablet, Rfl: 0    No Known Allergies    Past Medical History  Kris Myers  has a past medical history of Alcoholic hepatitis without ascites, Anemia, unspecified, Anxiety disorder, Arthritis, 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 with ulceration of other part of foot ([facility] [address] Cocaine abuse without complication ([facility] [address] 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, subsequent encounter ([facility] [address] Difficulty in walking, not elsewhere classified, Family history of tobacco abuse and dependence, Folate-deficiency anemia, Frostbite with tissue necrosis of right foot, subsequent encounter, Gangrene ([address] Gastroesophageal reflux disease without esophagitis, Hyperglycemia, Hyperlipemia, Hypo-osmolality and hyponatremia, Infection of amputation stump, unspecified extremity ([facility] [address] Inflammatory polyneuropathy ([facility] [address] Major depressive disorder, recurrent episode, moderate ([address] Muscle weakness (generalized), Other idiopathic peripheral autonomic neuropathy, Other injury of unspecified body region, initial encounter, Peripheral vascular angioplasty status, Peripheral vascular disease, unspecified ([address] Phantom limb syndrome with pain ([facility] [address] Primary hypertension, Pseudomonas (aeruginosa) (mallei) (pseudomallei) as the cause of diseases classified elsewhere, Severe sepsis without septic shock ([facility] [address] Substance induced mood disorder ([facility] [address] Superficial frostbite of right foot, initial encounter, Tobacco abuse, Uncomplicated alcohol abuse, Unilateral osteoarthritis of hip, right, Unilateral recurrent inguinal hernia with obstruction but no gangrene, Unspecified soft tissue disorder related to use, overuse and pressure, right ankle and foot, and Unsteadiness on feet. Social History  Kris Myers  reports that he has been smoking cigarettes. He has a 70.50 pack-year smoking history. He has never used smokeless tobacco. He reports current alcohol use. He reports that he does not currently use drugs after having used the following drugs: Cocaine. Frequency: 1.00 time per week. Family History  Kris Myers family history includes Cancer in his father; Heart Attack in his mother. There is no family history of bicuspid aortic valve, aneurysms, heart transplant, pacemakers, defibrillators, or sudden cardiac death. Past Surgical History   Past Surgical History:   Procedure Laterality Date    LEG SURGERY         Subjective:     Review of Systems   Constitutional:  Positive for activity change, appetite change and fatigue. Respiratory: Negative. Cardiovascular: Negative. Gastrointestinal: Negative. Genitourinary: Negative. Musculoskeletal:  Positive for arthralgias, back pain, gait problem and myalgias. Skin:  Positive for color change and wound.    Neurological: Negative for dizziness. Hematological: Negative. Psychiatric/Behavioral:  Positive for behavioral problems, confusion and sleep disturbance. The patient is nervous/anxious. Objective:     /69   Pulse (!) 113   Ht 6' (1.829 m)   Wt 147 lb 12.8 oz (67 kg)   BMI 20.05 kg/m²     Wt Readings from Last 3 Encounters:   09/26/22 147 lb 12.8 oz (67 kg)   09/19/22 140 lb 8 oz (63.7 kg)   06/14/22 120 lb (54.4 kg)     BP Readings from Last 3 Encounters:   09/26/22 133/69   09/19/22 134/72   06/14/22 126/72       Physical Exam  Constitutional:       Appearance: He is ill-appearing. HENT:      Head: Normocephalic and atraumatic. Neck:      Vascular: Carotid bruit present. Cardiovascular:      Rate and Rhythm: Normal rate and regular rhythm. Comments: Right AKA: incision healed  Left foot: no palpable pulses; no signals left foot  +left foot wound on dorsum of 1st MT area; some fibrinous exudate; no drainage, no blaise-wound erythema. No edema. Pulmonary:      Effort: Pulmonary effort is normal.      Breath sounds: Normal breath sounds. Abdominal:      General: Abdomen is flat. Palpations: Abdomen is soft. Musculoskeletal:         General: Tenderness and signs of injury present. Cervical back: Normal range of motion and neck supple. Neurological:      General: No focal deficit present. Mental Status: He is oriented to person, place, and time. Psychiatric:      Comments: Gibbs, depressed.          Lab Results   Component Value Date/Time    WBC 4.8 01/23/2022 05:47 AM    RBC 2.88 01/23/2022 05:47 AM    HGB 9.9 01/23/2022 05:47 AM    HCT 29.6 01/23/2022 05:47 AM    .8 01/23/2022 05:47 AM    MCH 34.4 01/23/2022 05:47 AM    MCHC 33.4 01/23/2022 05:47 AM    RDW 13.7 06/27/2017 07:34 AM     01/23/2022 05:47 AM    MPV 10.6 01/23/2022 05:47 AM       Lab Results   Component Value Date/Time     01/23/2022 05:47 AM    K 3.5 01/23/2022 05:47 AM    K 3.2 01/11/2022 06:19 AM     01/23/2022 05:47 AM    CO2 19 01/23/2022 05:47 AM    BUN 3 01/23/2022 05:47 AM    LABALBU 2.4 01/15/2022 05:41 AM    CREATININE 0.4 01/23/2022 05:47 AM    CALCIUM 8.4 01/23/2022 05:47 AM    LABGLOM >90 01/23/2022 05:47 AM    GLUCOSE 107 01/23/2022 05:47 AM       Lab Results   Component Value Date/Time    ALKPHOS 35 01/15/2022 05:41 AM    ALT 26 01/15/2022 05:41 AM    AST 39 01/15/2022 05:41 AM    PROT 5.6 01/15/2022 05:41 AM    BILITOT 0.5 01/15/2022 05:41 AM    BILIDIR 0.6 01/10/2022 02:30 PM    LABALBU 2.4 01/15/2022 05:41 AM       Lab Results   Component Value Date/Time    MG 1.4 01/20/2022 11:14 AM       Lab Results   Component Value Date    INR 1.56 (H) 01/12/2022         No results found for: LABA1C    No results found for: TRIG, HDL, LDLCALC, LDLDIRECT, LABVLDL    Lab Results   Component Value Date/Time    TSH 1.650 06/27/2017 07:34 AM         Testing Reviewed:      I have individually reviewed the below cardiac tests      Assessment/Plan     1. Athscl native arteries of left leg w ulceration oth prt foot ([address]      Orders Placed This Encounter   Procedures    MRI FOOT LEFT W WO CONTRAST    IR ANGIOGRAM EXTREMITY RIGHT   Also Carotid duplex for right carotid bruits. PLAN:  For angiogram left lower extremity in one week. Check MRI and Carotid duplex results. Return in about 1 week (around 10/3/2022) for Left leg angiogram procedure.       Electronically signed by Aileen Rodriguez MD   9/26/2022 at 10:33 AM EDT

## 2022-09-27 ENCOUNTER — TELEPHONE (OUTPATIENT)
Dept: CARDIOTHORACIC SURGERY | Age: 62
End: 2022-09-27

## 2022-09-27 DIAGNOSIS — T14.8XXA WOUND INFECTION: ICD-10-CM

## 2022-09-27 DIAGNOSIS — S91.302A OPEN WOUND OF LEFT FOOT, INITIAL ENCOUNTER: Primary | ICD-10-CM

## 2022-09-27 DIAGNOSIS — L08.9 WOUND INFECTION: ICD-10-CM

## 2022-09-27 NOTE — TELEPHONE ENCOUNTER
Dr. Yenny Asencio office called said they received a referral for the patient but their office does not accept [employer]. Will need referral sent somewhere else.

## 2022-09-27 NOTE — TELEPHONE ENCOUNTER
New referral placed to Dr. Mullen Doing  Faxed referral along with clinical information to [phone]

## 2022-09-29 ENCOUNTER — HOSPITAL ENCOUNTER (OUTPATIENT)
Dept: MRI IMAGING | Age: 62
Discharge: HOME | End: 2022-09-29
Payer: COMMERCIAL

## 2022-09-29 DIAGNOSIS — I70.245 ATHSCL NATIVE ARTERIES OF LEFT LEG W ULCERATION OTH PRT FOOT (HCC): ICD-10-CM

## 2022-09-29 PROCEDURE — 6360000004 HC RX CONTRAST MEDICATION: Performed by: THORACIC SURGERY (CARDIOTHORACIC VASCULAR SURGERY)

## 2022-09-29 PROCEDURE — 73720 MRI LWR EXTREMITY W/O&W/DYE: CPT

## 2022-09-29 PROCEDURE — A9579 GAD-BASE MR CONTRAST NOS,1ML: HCPCS | Performed by: THORACIC SURGERY (CARDIOTHORACIC VASCULAR SURGERY)

## 2022-09-29 RX ADMIN — GADOTERIDOL 15 ML: 279.3 INJECTION, SOLUTION INTRAVENOUS at 14:24

## 2022-09-29 RX ADMIN — GADOTERIDOL 15 ML: 279.3 INJECTION, SOLUTION INTRAVENOUS at 14:09

## 2022-10-03 ENCOUNTER — PREP FOR PROCEDURE (OUTPATIENT)
Dept: VASCULAR SURGERY | Age: 62
End: 2022-10-03

## 2022-10-03 ENCOUNTER — HOSPITAL ENCOUNTER (OUTPATIENT)
Dept: INTERVENTIONAL RADIOLOGY/VASCULAR | Age: 62
Discharge: OTHER INSTITUTION | End: 2022-10-03
Attending: THORACIC SURGERY (CARDIOTHORACIC VASCULAR SURGERY) | Admitting: THORACIC SURGERY (CARDIOTHORACIC VASCULAR SURGERY)
Payer: COMMERCIAL

## 2022-10-03 VITALS
SYSTOLIC BLOOD PRESSURE: 129 MMHG | TEMPERATURE: 98.2 F | RESPIRATION RATE: 19 BRPM | DIASTOLIC BLOOD PRESSURE: 63 MMHG | BODY MASS INDEX: 22.05 KG/M2 | OXYGEN SATURATION: 95 % | WEIGHT: 154 LBS | HEART RATE: 100 BPM | HEIGHT: 70 IN

## 2022-10-03 DIAGNOSIS — I70.245 ATHSCL NATIVE ARTERIES OF LEFT LEG W ULCERATION OTH PRT FOOT (HCC): ICD-10-CM

## 2022-10-03 LAB
ACTIVATED CLOTTING TIME: 167 SECONDS (ref 1–150)
ACTIVATED CLOTTING TIME: 219 SECONDS (ref 1–150)
ACTIVATED CLOTTING TIME: 242 SECONDS (ref 1–150)
ACTIVATED CLOTTING TIME: 260 SECONDS (ref 1–150)
APTT: 32.4 SECONDS (ref 22–38)
CREAT SERPL-MCNC: 0.5 MG/DL (ref 0.4–1.2)
ERYTHROCYTE [DISTWIDTH] IN BLOOD BY AUTOMATED COUNT: 18.6 % (ref 11.5–14.5)
ERYTHROCYTE [DISTWIDTH] IN BLOOD BY AUTOMATED COUNT: 54 FL (ref 35–45)
GFR SERPL CREATININE-BSD FRML MDRD: > 90 ML/MIN/1.73M2
HCT VFR BLD CALC: 39.6 % (ref 42–52)
HEMOGLOBIN: 13 GM/DL (ref 14–18)
INR BLD: 1 (ref 0.85–1.13)
MCH RBC QN AUTO: 26.7 PG (ref 26–33)
MCHC RBC AUTO-ENTMCNC: 32.8 GM/DL (ref 32.2–35.5)
MCV RBC AUTO: 81.5 FL (ref 80–94)
PLATELET # BLD: 331 THOU/MM3 (ref 130–400)
PMV BLD AUTO: 8.9 FL (ref 9.4–12.4)
RBC # BLD: 4.86 MILL/MM3 (ref 4.7–6.1)
WBC # BLD: 9.5 THOU/MM3 (ref 4.8–10.8)

## 2022-10-03 PROCEDURE — 6360000004 HC RX CONTRAST MEDICATION: Performed by: THORACIC SURGERY (CARDIOTHORACIC VASCULAR SURGERY)

## 2022-10-03 PROCEDURE — 37225 HC ATHERECTOMY FEM/POP: CPT

## 2022-10-03 PROCEDURE — 6360000002 HC RX W HCPCS: Performed by: RADIOLOGY

## 2022-10-03 PROCEDURE — 82565 ASSAY OF CREATININE: CPT

## 2022-10-03 PROCEDURE — 36415 COLL VENOUS BLD VENIPUNCTURE: CPT

## 2022-10-03 PROCEDURE — 75625 CONTRAST EXAM ABDOMINL AORTA: CPT

## 2022-10-03 PROCEDURE — 85027 COMPLETE CBC AUTOMATED: CPT

## 2022-10-03 PROCEDURE — 2709999900 IR ANGIOGRAM EXTREMITY LEFT

## 2022-10-03 PROCEDURE — 75774 ARTERY X-RAY EACH VESSEL: CPT

## 2022-10-03 PROCEDURE — 85347 COAGULATION TIME ACTIVATED: CPT

## 2022-10-03 PROCEDURE — 37220 HC PLASTY UNI ILIAC INITIAL: CPT

## 2022-10-03 PROCEDURE — 6360000002 HC RX W HCPCS: Performed by: THORACIC SURGERY (CARDIOTHORACIC VASCULAR SURGERY)

## 2022-10-03 PROCEDURE — 6370000000 HC RX 637 (ALT 250 FOR IP): Performed by: THORACIC SURGERY (CARDIOTHORACIC VASCULAR SURGERY)

## 2022-10-03 PROCEDURE — 75710 ARTERY X-RAYS ARM/LEG: CPT

## 2022-10-03 PROCEDURE — 2580000003 HC RX 258: Performed by: RADIOLOGY

## 2022-10-03 PROCEDURE — 85730 THROMBOPLASTIN TIME PARTIAL: CPT

## 2022-10-03 PROCEDURE — 85610 PROTHROMBIN TIME: CPT

## 2022-10-03 RX ORDER — FENTANYL CITRATE 50 UG/ML
INJECTION, SOLUTION INTRAMUSCULAR; INTRAVENOUS
Status: COMPLETED | OUTPATIENT
Start: 2022-10-03 | End: 2022-10-03

## 2022-10-03 RX ORDER — PROTAMINE SULFATE 10 MG/ML
10 INJECTION, SOLUTION INTRAVENOUS ONCE
Status: COMPLETED | OUTPATIENT
Start: 2022-10-03 | End: 2022-10-03

## 2022-10-03 RX ORDER — MIDAZOLAM HYDROCHLORIDE 1 MG/ML
INJECTION INTRAMUSCULAR; INTRAVENOUS
Status: COMPLETED | OUTPATIENT
Start: 2022-10-03 | End: 2022-10-03

## 2022-10-03 RX ORDER — SODIUM CHLORIDE 0.9 % (FLUSH) 0.9 %
5-40 SYRINGE (ML) INJECTION EVERY 12 HOURS SCHEDULED
Status: DISCONTINUED | OUTPATIENT
Start: 2022-10-03 | End: 2022-10-03 | Stop reason: HOSPADM

## 2022-10-03 RX ORDER — FENTANYL CITRATE 50 UG/ML
50 INJECTION, SOLUTION INTRAMUSCULAR; INTRAVENOUS ONCE
Status: COMPLETED | OUTPATIENT
Start: 2022-10-03 | End: 2022-10-03

## 2022-10-03 RX ORDER — MIDAZOLAM HYDROCHLORIDE 1 MG/ML
1 INJECTION INTRAMUSCULAR; INTRAVENOUS ONCE
Status: COMPLETED | OUTPATIENT
Start: 2022-10-03 | End: 2022-10-03

## 2022-10-03 RX ORDER — ONDANSETRON 4 MG/1
4 TABLET, ORALLY DISINTEGRATING ORAL EVERY 8 HOURS PRN
Status: DISCONTINUED | OUTPATIENT
Start: 2022-10-03 | End: 2022-10-03 | Stop reason: HOSPADM

## 2022-10-03 RX ORDER — SODIUM CHLORIDE 0.9 % (FLUSH) 0.9 %
5-40 SYRINGE (ML) INJECTION PRN
Status: DISCONTINUED | OUTPATIENT
Start: 2022-10-03 | End: 2022-10-03 | Stop reason: HOSPADM

## 2022-10-03 RX ORDER — OXYCODONE HYDROCHLORIDE 5 MG/1
10 TABLET ORAL EVERY 4 HOURS PRN
Status: DISCONTINUED | OUTPATIENT
Start: 2022-10-03 | End: 2022-10-03 | Stop reason: HOSPADM

## 2022-10-03 RX ORDER — HEPARIN SODIUM 1000 [USP'U]/ML
INJECTION, SOLUTION INTRAVENOUS; SUBCUTANEOUS
Status: COMPLETED | OUTPATIENT
Start: 2022-10-03 | End: 2022-10-03

## 2022-10-03 RX ORDER — SODIUM CHLORIDE 9 MG/ML
INJECTION, SOLUTION INTRAVENOUS PRN
Status: DISCONTINUED | OUTPATIENT
Start: 2022-10-03 | End: 2022-10-03 | Stop reason: HOSPADM

## 2022-10-03 RX ORDER — CLOPIDOGREL BISULFATE 75 MG/1
300 TABLET ORAL ONCE
Status: COMPLETED | OUTPATIENT
Start: 2022-10-03 | End: 2022-10-03

## 2022-10-03 RX ORDER — SODIUM CHLORIDE 0.9 % (FLUSH) 0.9 %
5-40 SYRINGE (ML) INJECTION EVERY 12 HOURS SCHEDULED
Status: CANCELLED | OUTPATIENT
Start: 2022-10-03

## 2022-10-03 RX ORDER — OXYCODONE HYDROCHLORIDE 5 MG/1
5 TABLET ORAL EVERY 4 HOURS PRN
Status: DISCONTINUED | OUTPATIENT
Start: 2022-10-03 | End: 2022-10-03 | Stop reason: HOSPADM

## 2022-10-03 RX ORDER — ONDANSETRON 2 MG/ML
4 INJECTION INTRAMUSCULAR; INTRAVENOUS EVERY 6 HOURS PRN
Status: DISCONTINUED | OUTPATIENT
Start: 2022-10-03 | End: 2022-10-03 | Stop reason: HOSPADM

## 2022-10-03 RX ORDER — SODIUM CHLORIDE 9 MG/ML
INJECTION, SOLUTION INTRAVENOUS PRN
Status: CANCELLED | OUTPATIENT
Start: 2022-10-03

## 2022-10-03 RX ORDER — SODIUM CHLORIDE 450 MG/100ML
INJECTION, SOLUTION INTRAVENOUS CONTINUOUS
Status: DISCONTINUED | OUTPATIENT
Start: 2022-10-03 | End: 2022-10-03 | Stop reason: HOSPADM

## 2022-10-03 RX ORDER — SODIUM CHLORIDE 0.9 % (FLUSH) 0.9 %
5-40 SYRINGE (ML) INJECTION PRN
Status: CANCELLED | OUTPATIENT
Start: 2022-10-03

## 2022-10-03 RX ADMIN — FENTANYL CITRATE 50 MCG: 50 INJECTION, SOLUTION INTRAMUSCULAR; INTRAVENOUS at 15:07

## 2022-10-03 RX ADMIN — CLOPIDOGREL BISULFATE 300 MG: 75 TABLET ORAL at 17:01

## 2022-10-03 RX ADMIN — FENTANYL CITRATE 50 MCG: 50 INJECTION, SOLUTION INTRAMUSCULAR; INTRAVENOUS at 14:49

## 2022-10-03 RX ADMIN — IOPAMIDOL 100 ML: 612 INJECTION, SOLUTION INTRAVENOUS at 15:26

## 2022-10-03 RX ADMIN — FENTANYL CITRATE 50 MCG: 50 INJECTION, SOLUTION INTRAMUSCULAR; INTRAVENOUS at 14:18

## 2022-10-03 RX ADMIN — MIDAZOLAM 1 MG: 1 INJECTION INTRAMUSCULAR; INTRAVENOUS at 14:39

## 2022-10-03 RX ADMIN — FENTANYL CITRATE 50 MCG: 50 INJECTION, SOLUTION INTRAMUSCULAR; INTRAVENOUS at 13:51

## 2022-10-03 RX ADMIN — HEPARIN SODIUM 7000 UNITS: 1000 INJECTION INTRAVENOUS; SUBCUTANEOUS at 14:15

## 2022-10-03 RX ADMIN — FENTANYL CITRATE 50 MCG: 50 INJECTION, SOLUTION INTRAMUSCULAR; INTRAVENOUS at 14:39

## 2022-10-03 RX ADMIN — MIDAZOLAM 1 MG: 1 INJECTION INTRAMUSCULAR; INTRAVENOUS at 14:18

## 2022-10-03 RX ADMIN — HEPARIN SODIUM 2500 UNITS: 1000 INJECTION INTRAVENOUS; SUBCUTANEOUS at 14:31

## 2022-10-03 RX ADMIN — PROTAMINE SULFATE 10 MG: 10 INJECTION, SOLUTION INTRAVENOUS at 15:30

## 2022-10-03 RX ADMIN — SODIUM CHLORIDE: 4.5 INJECTION, SOLUTION INTRAVENOUS at 12:38

## 2022-10-03 RX ADMIN — MIDAZOLAM 1 MG: 1 INJECTION INTRAMUSCULAR; INTRAVENOUS at 13:51

## 2022-10-03 RX ADMIN — MIDAZOLAM 1 MG: 1 INJECTION INTRAMUSCULAR; INTRAVENOUS at 15:08

## 2022-10-03 RX ADMIN — MIDAZOLAM 1 MG: 1 INJECTION INTRAMUSCULAR; INTRAVENOUS at 14:49

## 2022-10-03 NOTE — OP NOTE
Operative Note      Patient: Aysha Rai  YOB: 1960  MRN: [id number]    Date of Procedure: 10/3/2022    Pre-Op Diagnosis:   Left plantar foot open chronic wound  Rest pain left lower extremity  Heavy tobacco use  ETOH abuse  Known PAD  HTN  S/P Right AKA    Post-Op Diagnosis: Same, PLUS:    99% Right External Iliac artery stenosis  90% ostial Right and Left Common Iliac stenosis  70% Left External Iliac artery stenosis  90% Left SFA stenosis   90% Left Popliteal above knee stenosis  Patent Left Anterior Tibial artery  Patent Left Posterior Tibial artery  Patent Left Peroneal artery       PROCEDURE:    USG guidance, Right Common Femoral artery  Right Common Femoral angiogram  Balloon angioplasty of the Bilateral Common and External Iliac arteries with 5 mm x 200 and 6 mm x 200 balloons  Selective Left Popliteal angiogram  2.2 mm Phoenix atherectomy to the Left SFA/Popliteal arteries  Balloon angioplasty to the Left SFA/Popliteal arteries with 6 mm x 250 [employer] DCB balloons x 2    Assistant:   None    Anesthesia:   Versed 5 mg  Fentanyl 25 mcg    Operative Comments:  Heparin 9500 units  Contrast 100 mL  Fluoroscopy Time 28.3 min @730 mGy  Protamine 10 mg     Estimated Blood Loss (mL): Minimal    Complications: None    Specimens:   * No specimens in log *    Implants:  * No implants in log *      Drains: * No LDAs found *    Findings:    99% Right External Iliac artery stenosis  90% ostial Right and Left Common Iliac stenosis  70% Left External Iliac artery stenosis  90% Left SFA stenosis   90% Left Popliteal above knee stenosis  Patent Left Anterior Tibial artery  Patent Left Posterior Tibial artery  Patent Left Peroneal artery      Detailed Description of Procedure: The patient was seen and positively identified in the preoperative holding area. Informed consent was verified, and all preoperative workup and imaging and laboratory values were reviewed.   The procedure to be undertaken was reviewed and explained to the patient again. All risks/possible complications, possible alternatives were discussed, as was the general intraoperative and postoperative course of this procedure. All questions were answered to the patient's satisfaction. The patient was then taken to the OR and placed supine on the operative table. Moderate sedation was commenced. The patient was then prepped and draped in the usual sterile fashion. Time out was called and verified. Ultrasound evaluation was used to evaluate the different access sites for the angiography to be performed. Ultrasound was used to visualize the right  common femoral artery, and duplex was used to demonstrate flow and vessel patent. Ultrasound was used to perform real-time imagery of needle advancement into the right  common femoral artery. U/S guided access was used to place a 5 Western Silva micropuncture catheter into the right common femoral artery. Angiography was performed which confirmed placement in the right common femoral artery. An 0.035 guide-wire was advanced into the micro puncture catheter, which was exchanged for a 5-English sheath. Subsequently a catheter was directed into the aorta to the level of the renal arteries, and an angiography of the aorta was performed. This was notable for severe stenosis of the right external iliac artery with heavy calcification that required a directional catheter to get the wire into the aorta. The catheter was manipulated down to the level of the aortic bifurcation, and an angiogram was performed of the hypogastric and profunda femoris vessels.  The catheter was manipulated to the left common and superficial femoral artery, and angiography was performed of the left lower extremity in multiple stations:      ANGIOGRAPHIC FINDINGS:      Aorta widely patent   99% Right External Iliac artery stenosis  90% ostial Right and Left Common Iliac stenosis  70% Left External Iliac artery stenosis  90% Left SFA stenosis   90% Left Popliteal above knee stenosis  Patent Left Anterior Tibial artery  Patent Left Posterior Tibial artery  Patent Left Peroneal artery    Thus, given the above findings, an intervention was indicated. The patient was systemically heparinized, and the ACT's were checked every 30 minutes and the ACT was kept above 250 seconds. Given that the patient had severe calcific stenosis to the bilateral iliac arteries, it was surmised that no large catheter, sheath or device would cross over to the left SFA/Popliteal arteries. Thus, through the 5F sheath 5 mm and 6 mm long balloons were used to pre-dilate the iliac arteries and the bifurcation. Extensive waist on the balloons noted. Once done, the 5F short sheath was exchanged to a 6F x 45 cm working sheath. However, the 6F sheath could not pass beyond the proximal left common iliac artery due to the recoil of this extensive stenosis. Thus, a decision was made to intervene on the left SFA/Popliteal arteries with the sheath in this position. Thus, a 0.035 wire was placed to the left below knee popliteal artery. The wire was exchanged to a 0.014 Phoenix wire. Over this Phoenix wire, the 2.2 mm Phoenix atherectomy device was placed, and multiple passes were made from the ostium of the left SFA to the knee joint level of the left Popliteal artery. Once done, the left SFA/Popliteal arteries were post-dilated with 6 mm long DCB balloons. Once done, completion angiogram revealed excellent result on the left SFA/Popliteal arteries with less than 10% residual stenosis with preserved left anterior and posterior tibial runoff to foot. The long 6F sheath was exchanged to a short 6F x 11 cm sheath. Protamine was given, and after ACTs were normalized, the sheath was pulled and manual compression was held and good hemostasis was noted.   DSD applied and patient was returned to the outpatient area with no complications and he tolerated the procedure well.    PLAN:    Bedrest x 4 hours. Plavix 300 mg po x 1, then Plavix 75 mg and ASA 81 mg daily. Resume all other home medications. Will need 2nd stage Bilateral femoral access, bilateral common and external iliac artery stents with IVUS in 4-6 weeks. F/U in office in 2 weeks to discuss.   Electronically signed by Shireen Johnson MD on 10/3/2022 at 4:05 PM

## 2022-10-03 NOTE — FLOWSHEET NOTE
Received from specials. Right groin procedure site stable. Pt aware to keep right leg still, head down and not to cross his legs. 0.45 normal saline cont. Taking sips of water. Denies pain or needs.  Resting with easy resp

## 2022-10-03 NOTE — PROGRESS NOTES
1315 Patient received in IR for procedure  1325 This procedure has been fully reviewed with the patient and written informed consent has been obtained. 1333 Patient prepped for procedure. 1349 Procedure started with Dr. Deisy Ndiaye.  1351 Lidocaine given. [phone] Access with use of sonosite to right femoral artery with MP.  1352 5F sheath deployed to right femoral artery. 1419 Angioplasty of the left iliac artery with [place] 5x200.   1420 Angioplasty of the right iliac artery with [place] 5x200.   1425 Act Started. 1430 Angioplasty of the right iliac artery with [place] 5x80.   1430 . Dr. Deisy Ndiaye notified. Orders received. 1434 Angioplasty of the left iliac artery with [place] 5x80.   1436 Angioplasty of the right iliac artery with [place] 5x80.   1442 ACT started. 1447 . Dr. Deisy Ndiaye notified. No orders. 8220 [facility] being used br Dr. Deisy Ndiaye in the left SFA. 1506 Angioplasty of the SFA and popliteal with IN. PACT 5x250. PT having pain. Orders received from Dr. Deisy Ndiaye.   1512 Angioplasty of the SFA with IN. PACT 5x250.  1522 ACT started. 1523 Procedure completed; patient tolerated well. 1526 . Dr. Deisy Ndiaye notified. Orders received. 1530 Protamine injection started. 1533 Protamine injected completed. 1541 ACT started. 1545 . Dr. Deisy Ndiaye notified. Orders received to pull the sheath. 1546 Right sheath removed. Manual pressure initiated by FadiaRT(R). 1555 Quick clot applied. 1602 Pt just voided 800mL. 1618 Report called to LEFTY Espinoza on 2E.  1623 Quick clot, 4x4, op-site to right femoral site; area soft to touch with no bleeding noted. 1626 Patient on bed; comfort ensured. Right femoral dressing remains dry and intact with area soft. 1627 Patient taken to 2E via bed with family at bedside. Right femoral dressing remains dry and intact with area soft.

## 2022-10-03 NOTE — PRE SEDATION
Sedation Pre-Procedure Note    Patient Name: Monie Presley   YOB: 1960  Room/Bed: [facility]09/009-A  Medical Record Number: [id number]  Date: 10/3/2022   Time: 12:52 PM       Indication:  Left leg chronic open wound and PAD    Consent: I have discussed with the patient and/or the patient representative the indication, alternatives, and the possible risks and/or complications of the planned procedure and the anesthesia methods. The patient and/or patient representative appear to understand and agree to proceed.     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, subsequent encounter ([address] Difficulty in walking, not elsewhere classified, Family history of tobacco abuse and dependence, Folate-deficiency anemia, Frostbite with tissue necrosis of right foot, subsequent encounter, Gangrene ([address] Gastroesophageal reflux disease without esophagitis, Hyperglycemia, Hyperlipemia, Hypo-osmolality and hyponatremia, Infection of amputation stump, unspecified extremity ([address] Inflammatory polyneuropathy ([address] Major depressive disorder, recurrent episode, moderate ([address] Muscle weakness (generalized), Other idiopathic peripheral autonomic neuropathy, Other injury of unspecified body region, initial encounter, Peripheral vascular angioplasty status, Peripheral vascular disease, unspecified ([address] Phantom limb syndrome with pain ([address] Primary hypertension, Pseudomonas (aeruginosa) (mallei) (pseudomallei) as the cause of diseases classified elsewhere, Severe sepsis without septic shock ([address] Substance induced mood disorder ([facility] Superficial frostbite of right foot, initial encounter, Tobacco abuse, Uncomplicated alcohol abuse, Unilateral osteoarthritis of hip, right, Unilateral recurrent inguinal hernia with obstruction but no gangrene, Unspecified soft tissue disorder related to use, overuse and pressure, right ankle and foot, and Unsteadiness on feet. Past Surgical History:   has a past surgical history that includes Leg Surgery (Right). Medications:   Scheduled Meds:    fentanNYL  50 mcg IntraVENous Once    iopamidol  100 mL IntraVENous Once    midazolam  1 mg IntraVENous Once    sodium chloride flush  5-40 mL IntraVENous 2 times per day     Continuous Infusions:    sodium chloride 20 mL/hr at 10/03/22 1238    sodium chloride       PRN Meds: sodium chloride flush, sodium chloride  Home Meds:   Prior to Admission medications    Medication Sig Start Date End Date Taking? Authorizing Provider   collagenase 250 UNIT/GM ointment Apply topically daily Apply topically daily to left foot wound on dorsum of foot. Historical Provider, MD   acetaminophen (TYLENOL) 325 MG tablet Take 650 mg by mouth every 6 hours as needed for Pain    Historical Provider, MD   gabapentin (NEURONTIN) 300 MG capsule Take 300 mg by mouth 3 times daily.     Historical Provider, MD   clopidogrel (PLAVIX) 75 MG tablet Take 75 mg by mouth daily    Historical Provider, MD   aspirin 81 MG EC tablet Take by mouth 5/16/22   Historical Provider, MD   cyclobenzaprine (FLEXERIL) 10 MG tablet Take by mouth 3/8/22   Historical Provider, MD   lidocaine (LIDODERM) 5 % Apply topically 5/16/22   Historical Provider, MD   Menthol, Topical Analgesic, 4 % GEL Apply topically 5/16/22   Historical Provider, MD   lisinopril (PRINIVIL;ZESTRIL) 5 MG tablet Take 1 tablet by mouth daily 1/25/22   SUSI Fish CNP   melatonin 3 MG TABS tablet Take 2 tablets by mouth nightly as needed (sleep) 1/24/22   SUSI Fish CNP   pantoprazole (PROTONIX) 20 MG tablet Take 2 tablets by mouth daily for 30 doses 1/24/22 10/3/22  SUSI Terry CNP   thiamine 100 MG tablet Take 1 tablet by mouth daily 1/25/22   SUSI Terry CNP   Multiple Vitamin (MULTIVITAMIN) TABS tablet Take 1 tablet by mouth daily 1/25/22   SUSI Terry CNP     Coumadin Use Last 7 Days:  no  Antiplatelet drug therapy use last 7 days: yes -   Other anticoagulant use last 7 days: no  Additional Medication Information:  N/A      Pre-Sedation Documentation and Exam:   I have personally completed a history, physical exam & review of systems for this patient (see notes). Vital signs have been reviewed (see flow sheet for vitals). I have reviewed the patient's history and review of systems. Lungs: clear to auscultation bilaterally, Cardiovascular: regular rate and rhythm.     Mallampati Airway Assessment:  Mallampati Class III - (soft palate & base of uvula are visible)    Prior History of Anesthesia Complications:   none    ASA Classification:  Class 3 - A patient with severe systemic disease that limits activity but is not incapacitating    Sedation/ Anesthesia Plan:   intravenous sedation    Medications Planned:   fentanyl intravenously and midazolam (Versed)  intravenously    Patient is an appropriate candidate for plan of sedation: yes    Electronically signed by Naz Manzano MD on 10/3/2022 at 12:52 PM

## 2022-10-03 NOTE — PLAN OF CARE
Problem: Discharge Planning  Goal: Discharge to home or other facility with appropriate resources  Outcome: Progressing     Problem: Discharge Planning  Goal: Discharge to home or other facility with appropriate resources  Outcome: Progressing     Problem: Safety - Adult  Goal: Free from fall injury  Outcome: Progressing

## 2022-10-03 NOTE — PLAN OF CARE
Problem: Discharge Planning  Goal: Discharge to home or other facility with appropriate resources  10/3/2022 1725 by Opal Petty RN  Outcome: Adequate for Discharge  Flowsheets (Taken 10/3/2022 1222)  Discharge to home or other facility with appropriate resources: Identify barriers to discharge with patient and caregiver  10/3/2022 1156 by Opal Petty RN  Outcome: Progressing     Problem: Safety - Adult  Goal: Free from fall injury  10/3/2022 1725 by Opal Petty RN  Outcome: Adequate for Discharge  10/3/2022 1157 by Opal Petty RN  Outcome: Progressing     Problem: Skin/Tissue Integrity  Goal: Absence of new skin breakdown  Description: 1. Monitor for areas of redness and/or skin breakdown  2. Assess vascular access sites hourly  3. Every 4-6 hours minimum:  Change oxygen saturation probe site  4. Every 4-6 hours:  If on nasal continuous positive airway pressure, respiratory therapy assess nares and determine need for appliance change or resting period.   Outcome: Adequate for Discharge

## 2022-10-03 NOTE — H&P
[facility]  [address] [place] [address] [address]  Dept: [phone]  Dept Fax: [phone]: [phone]     Visit Date: 9/26/2022     Mr. Lindsey Witt is a 63 y.o.male  who presented for:  No chief complaint on file. HPI:   HPI   63year old male with PMHx of heavy tobacco abuse, ETOH abuse, and previous polysubstance abuse, PAD, and HTN, and poor social situation with homelessness, presents for evaluation of left foot pain and open chronic wound to the left forefoot. He is a very poor historian, and some history obtained in chart. He states that in late December 2021 or early January 2022, he had frostbite to the right forefoot. He presented to the ED here at [address] [place] [address], but for some reason, he was sent to [facility], and referrred to Podiatry there. He reportedly had some debridements, but his forefoot gangrene progressed, and he ended up with a right TMA. This did not slow the progression of gangrene, so he then underwent a right BKA. He then was brought back to [address] [place] [address], where he at some point in this worsening situation had a right leg angiogram, which revealed a right SFA occlusion. He underwent a right SFA intervention, but the gangrene kept progressing and the right BKA was reportedly non-viable, so he underwent a right AKA. Following this right AKA, the stump has healed, and has done well, and he has a prosthesis, but he doesn't use it and uses a wheelchair. He lives in a nursing home now, and over the past one month he has noticed a chronic non-healing wound to the left forefoot. The left forefoot wound is non-draining, but has exudate and no wound care is ongoing. No erythema, or cellulitis and nor is he on oral or IV ABX. No MRI or other studies to the foot.      Accompanying this left forefoot ulcer is rest pain, claudication of the left lower leg and calf and foot, numbness, paresthesias and severe coolness. He has had an arterial duplex earlier this year, which revealed a left popliteal high grade stenosis and also a right SFA occlusion. He continues to smoker unfortunately. He is on ASA/Plavix.            Current Outpatient Medications:     collagenase 250 UNIT/GM ointment, Apply topically daily Apply topically daily to left foot wound on dorsum of foot., Disp: , Rfl:     aspirin 81 MG EC tablet, Take by mouth, Disp: , Rfl:     lisinopril (PRINIVIL;ZESTRIL) 5 MG tablet, Take 1 tablet by mouth daily, Disp: 30 tablet, Rfl: 0    melatonin 3 MG TABS tablet, Take 2 tablets by mouth nightly as needed (sleep), Disp: 10 tablet, Rfl: 0    thiamine 100 MG tablet, Take 1 tablet by mouth daily, Disp: 30 tablet, Rfl: 0    Multiple Vitamin (MULTIVITAMIN) TABS tablet, Take 1 tablet by mouth daily, Disp: 30 tablet, Rfl: 0    cyclobenzaprine (FLEXERIL) 10 MG tablet, Take by mouth, Disp: , Rfl:     lidocaine (LIDODERM) 5 %, Apply topically (Patient not taking: Reported on 9/26/2022), Disp: , Rfl:     Menthol, Topical Analgesic, 4 % GEL, Apply topically, Disp: , Rfl:     pantoprazole (PROTONIX) 20 MG tablet, Take 2 tablets by mouth daily for 30 doses, Disp: 60 tablet, Rfl: 0     No Known Allergies     Past Medical History  Marisabel Souza  has a past medical history of Alcoholic hepatitis without ascites, Anemia, unspecified, Anxiety disorder, Arthritis, 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 with ulceration of other part of foot ([address] Cocaine abuse without complication ([address] 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, subsequent encounter ([facility] [address] Difficulty in walking, not elsewhere classified, Family history of tobacco abuse and dependence, Folate-deficiency anemia, Frostbite with tissue necrosis of right foot, subsequent encounter, Gangrene ([address] Gastroesophageal reflux disease without esophagitis, Hyperglycemia, Hyperlipemia, Hypo-osmolality and hyponatremia, Infection of amputation stump, unspecified extremity ([facility] Inflammatory polyneuropathy ([facility] Major depressive disorder, recurrent episode, moderate ([address] Muscle weakness (generalized), Other idiopathic peripheral autonomic neuropathy, Other injury of unspecified body region, initial encounter, Peripheral vascular angioplasty status, Peripheral vascular disease, unspecified ([facility] Phantom limb syndrome with pain ([facility] Primary hypertension, Pseudomonas (aeruginosa) (mallei) (pseudomallei) as the cause of diseases classified elsewhere, Severe sepsis without septic shock ([facility] [address] [facility] Substance induced mood disorder ([facility] [address] Superficial frostbite of right foot, initial encounter, Tobacco abuse, Uncomplicated alcohol abuse, Unilateral osteoarthritis of hip, right, Unilateral recurrent inguinal hernia with obstruction but no gangrene, Unspecified soft tissue disorder related to use, overuse and pressure, right ankle and foot, and Unsteadiness on feet. Social History  Gemma Fraser  reports that he has been smoking cigarettes. He has a 70.50 pack-year smoking history. He has never used smokeless tobacco. He reports current alcohol use. He reports that he does not currently use drugs after having used the following drugs: Cocaine. Frequency: 1.00 time per week. Family History  Gemma Fraser family history includes Cancer in his father; Heart Attack in his mother. There is no family history of bicuspid aortic valve, aneurysms, heart transplant, pacemakers, defibrillators, or sudden cardiac death. Past Surgical History         Past Surgical History:   Procedure Laterality Date    LEG SURGERY             Subjective:      Review of Systems   Constitutional:  Positive for activity change, appetite change and fatigue. Respiratory: Negative. Cardiovascular: Negative. Gastrointestinal: Negative. Genitourinary: Negative. Musculoskeletal:  Positive for arthralgias, back pain, gait problem and myalgias.    Skin: Positive for color change and wound. Neurological:  Negative for dizziness. Hematological: Negative. Psychiatric/Behavioral:  Positive for behavioral problems, confusion and sleep disturbance. The patient is nervous/anxious. Objective:      /69   Pulse (!) 113   Ht 6' (1.829 m)   Wt 147 lb 12.8 oz (67 kg)   BMI 20.05 kg/m²          Wt Readings from Last 3 Encounters:   09/26/22 147 lb 12.8 oz (67 kg)   09/19/22 140 lb 8 oz (63.7 kg)   06/14/22 120 lb (54.4 kg)          BP Readings from Last 3 Encounters:   09/26/22 133/69   09/19/22 134/72   06/14/22 126/72         Physical Exam  Constitutional:       Appearance: He is ill-appearing. HENT:      Head: Normocephalic and atraumatic. Neck:      Vascular: Carotid bruit present. Cardiovascular:      Rate and Rhythm: Normal rate and regular rhythm. Comments: Right AKA: incision healed  Left foot: no palpable pulses; no signals left foot  +left foot wound on dorsum of [facility] [place] area; some fibrinous exudate; no drainage, no blaise-wound erythema. No edema. Pulmonary:      Effort: Pulmonary effort is normal.      Breath sounds: Normal breath sounds. Abdominal:      General: Abdomen is flat. Palpations: Abdomen is soft. Musculoskeletal:         General: Tenderness and signs of injury present. Cervical back: Normal range of motion and neck supple. Neurological:      General: No focal deficit present. Mental Status: He is oriented to person, place, and time. Psychiatric:      Comments: Gibbs, depressed.                   Lab Results   Component Value Date/Time     WBC 4.8 01/23/2022 05:47 AM     RBC 2.88 01/23/2022 05:47 AM     HGB 9.9 01/23/2022 05:47 AM     HCT 29.6 01/23/2022 05:47 AM     .8 01/23/2022 05:47 AM     MCH 34.4 01/23/2022 05:47 AM     MCHC 33.4 01/23/2022 05:47 AM     RDW 13.7 06/27/2017 07:34 AM      01/23/2022 05:47 AM     MPV 10.6 01/23/2022 05:47 AM               Lab Results   Component Value Date/Time      01/23/2022 05:47 AM     K 3.5 01/23/2022 05:47 AM     K 3.2 01/11/2022 06:19 AM      01/23/2022 05:47 AM     CO2 19 01/23/2022 05:47 AM     BUN 3 01/23/2022 05:47 AM     LABALBU 2.4 01/15/2022 05:41 AM     CREATININE 0.4 01/23/2022 05:47 AM     CALCIUM 8.4 01/23/2022 05:47 AM     LABGLOM >90 01/23/2022 05:47 AM     GLUCOSE 107 01/23/2022 05:47 AM               Lab Results   Component Value Date/Time     ALKPHOS 35 01/15/2022 05:41 AM     ALT 26 01/15/2022 05:41 AM     AST 39 01/15/2022 05:41 AM     PROT 5.6 01/15/2022 05:41 AM     BILITOT 0.5 01/15/2022 05:41 AM     BILIDIR 0.6 01/10/2022 02:30 PM     LABALBU 2.4 01/15/2022 05:41 AM               Lab Results   Component Value Date/Time     MG 1.4 01/20/2022 11:14 AM               Lab Results   Component Value Date     INR 1.56 (H) 01/12/2022            No results found for: LABA1C     No results found for: TRIG, HDL, LDLCALC, LDLDIRECT, LABVLDL           Lab Results   Component Value Date/Time     TSH 1.650 06/27/2017 07:34 AM          Testing Reviewed:       I have individually reviewed the below cardiac tests        Assessment/Plan      1. Athscl native arteries of left leg w ulceration oth prt foot ([address]           Orders Placed This Encounter   Procedures    MRI FOOT LEFT W WO CONTRAST    IR ANGIOGRAM EXTREMITY RIGHT   Also Carotid duplex for right carotid bruits. PLAN:  For angiogram left lower extremity in one week. Check MRI and Carotid duplex results. I had a discussion in the office with the patient about his diagnosis, the expected procedure involved, and all risks/benefits/alternatives and the possible complications. After this discussion, the patient understood all risks and wished to proceed. Return in about 1 week (around 10/3/2022) for Left leg angiogram procedure.

## 2022-10-03 NOTE — DISCHARGE INSTRUCTIONS
Resume all home medications, including aspirin and Plavix. Remove dressing in right femoral area in 48 hours, shower, and leave off. Follow up vascular clinic 2 to 3 weeks    Always wash hands before touching incision area. For Groin approach:    Remove  dressing in 24 hours, gently clean site with soap and water, pat dry, and apply bandaid daily for 5 days. Keep site clean and dry. Do not apply any creams, lotions, or powders to puncture site. May shower in 24 hours. Do not submerse site in water (no tub baths, swimming, or whirlpool) for 5 days. Take it easy for 3 days. No driving for 3 days. Avoid heavy  lifting, pushing, pulling or straining. Monitor site for bleeding, drainage, or swelling. Monitor for signs of infection which include:  redness, drainage, soreness, or temp greater than 101 F. Notify doctor of any abnormal findings as listed. If bleeding occurs, hold firm pressure at site and call 911. Discharge Instructions for peripheral angioplasty      Steps to Take   Home Care  Bathe and shower as usual. But keep the wound dry and covered with a bandage for the first 24 hours. After the first 24 hours, remove the bandage before you shower. Wash the area with soap and water daily. Replace it with a new bandage after cleaning. Do not soak in a pool or tub and do not swim for one week after the test.    If there is any bleeding at the catheter site, apply firm pressure with your hands until the bleeding stops. Diet    If advised by your doctor:   Drink plenty of fluids after the test. This will flush the x-ray dye from your system. Return to your normal diet. Physical Activity    The sedative will make you sleepy. Rest until the effects have worn off. Ask your doctor when you will be able to return to work. Do not drive until your doctor says it is okay. Avoid heavy lifting, physically demanding activities, and sexual activity for 5-7 days.     Your activity will also depend upon where the catheter was inserted:   If the catheter was inserted in an arm blood vesselKeep your arm straight and still with an arm board for two hours after the procedure is completed. If a vessel in your groin was usedLie flat on your back for at least a few hours to prevent bleeding. A compression device may also be placed on your groin to prevent bleeding. Do not sit for long periods of time. Try to change positions frequently. Medications    If advised by your doctor, resume taking your normal medicines. Use acetaminophen (Tylenol) for pain relief. Do not take metformin (Glucophage) or glyburide and metformin (Glucovance) for 48 hours after the test or until your doctor says it is okay. If you had to stop taking these medications before the procedure, ask your doctor when you can resume taking them:   Anti-inflammatory drugs (eg, ibuprofen )   Blood thinners, such as warfarin (Coumadin)   Clopidogrel (Plavix)   If you are taking medicines, follow these general guidelines:   Take your medicine as directed. Do not change the amount or the schedule. Do not stop taking them without talking to your doctor. Do not share them. Know what the results and side effects. Report them to your doctor. Some drugs can be dangerous when mixed. Talk to a doctor or pharmacist if you are taking more than one drug. This includes over-the-counter medicine and herb or dietary supplements. Plan ahead for refills so you don't run out. Lifestyle Changes    Based on the results of the test, your doctor may instruct you to make certain heart-healthy lifestyle changes. These may include dietary changes or increasing your exercise. Follow-up   The test results are generally available right after the procedure. At that point, your doctor will discuss the findings and suggest appropriate treatment options. In some cases, the results can indicate an immediate need for surgery.  Schedule a follow-up appointment as directed by your doctor. Call Your Doctor If Any of the Following Occurs   Monitor your recovery once you leave the hospital. If you have a problem, alert your doctor. If any of the following occur, call your doctor:   Signs of infection, including fever and chills   Redness, swelling, increasing pain, excessive bleeding, or any discharge from the catheter insertion site   Call 911 If Any of the Following Occurs   CALL 911  if you have symptoms including:   Drooping facial muscles   Changes in vision or speech   Difficulty walking or using your limbs   Change in sensation to affected leg/hand, including numbness, feeling cold, or change in color   Extreme sweating, nausea or vomiting   Dizziness or lightheadedness   Chest pain   Rapid, irregular heartbeat   Palpitations   Cough, shortness of breath, or difficulty breathing   Weakness or fainting   If you think you have an emergency,  CALL 911  . Home going sedation advice sheet given to patient. SEDATION/ANALGESIA INFORMATION/HOME GOING ADVICE    SEDATION / ANALGESIA INFORMATION / HOME GOING ADVICE  You have received the sedation/analgesia medication during your visit     Sedation/analgesia is used during short medical procedures under controlled supervision. The medication will produce a strong relaxation. You will be able to hear, speak and follow instructions, but your memory and alertness will be decreased. You will be able to swallow and breathe on your own. During sedation/analgesia your blood pressure, heart and breathing will be watched closely. After the procedure, you may not remember what was said or done. You may have the following effects from the medication. \"           Drowsiness, dizziness, sleepiness or confusion. \"           Difficulty remembering or delayed reaction times. \"           Loss of fine muscle control or difficulty with your balance especially while walking.   \"           Difficulty focusing or blurred vision. You may not be aware of slight changes in your behavior and/or your reaction time because of the medication used during the procedure. Therefore you should follow these instructions. \"           Have someone responsible help you with your care. \"           Do not drive for 24 hours. \"           Do not operate equipment for 24 hours (lawnmowers, power tools, kitchen accessories, stove). \"           Do not drink any alcoholic beverages for a minimum of 24 hours. \"           Do not make important personal, legal or business decisions for 24 hours. \"           You may experience dizziness or lightheadedness. Move slowly and carefully, do not make sudden position changes. \"           Drink extra amounts of fluids today. \"           Increase your diet as tolerated (unless you have received specific instructions from your doctor). \"           If you feel nauseated, continue with liquids until the nausea is gone. \"           Notify your physician if you have not urinated within 8 hours after the procedure. \"           Resume your medications unless otherwise instructed. Contact your physician if you have any questions or concerns. IF YOU REPORT TO AN EMERGENCY ROOM, DOCTOR'S OFFICE OR HOSPITAL WITHIN 24 HOURS AFTER YOUR PROCEDURE, BRING THIS SHEET AND YOUR AFTER VISIT SUMMARY WITH YOU AND GIVE IT TO THE PHYSICIAN OR NURSE ATTENDING YOU.                         SEDATION/ANALGESIA INFORMATION/HOME GOING ADVICE    SEDATION / ANALGESIA INFORMATION / [address] 87 have received the sedation/analgesia medication during your visit     Sedation/analgesia is used during short medical procedures under controlled supervision. The medication will produce a strong relaxation. You will be able to hear, speak and follow instructions, but your memory and alertness will be decreased. You will be able to swallow and breathe on your own.  During sedation/analgesia your blood pressure, heart and breathing will be watched closely. After the procedure, you may not remember what was said or done. You may have the following effects from the medication. \"           Drowsiness, dizziness, sleepiness or confusion. \"           Difficulty remembering or delayed reaction times. \"           Loss of fine muscle control or difficulty with your balance especially while walking. \"           Difficulty focusing or blurred vision. You may not be aware of slight changes in your behavior and/or your reaction time because of the medication used during the procedure. Therefore you should follow these instructions. \"           Have someone responsible help you with your care. \"           Do not drive for 24 hours. \"           Do not operate equipment for 24 hours (lawnmowers, power tools, kitchen accessories, stove). \"           Do not drink any alcoholic beverages for a minimum of 24 hours. \"           Do not make important personal, legal or business decisions for 24 hours. \"           You may experience dizziness or lightheadedness. Move slowly and carefully, do not make sudden position changes. \"           Drink extra amounts of fluids today. \"           Increase your diet as tolerated (unless you have received specific instructions from your doctor). \"           If you feel nauseated, continue with liquids until the nausea is gone. \"           Notify your physician if you have not urinated within 8 hours after the procedure. \"           Resume your medications unless otherwise instructed. Contact your physician if you have any questions or concerns. IF YOU REPORT TO AN EMERGENCY ROOM, DOCTOR'S OFFICE OR HOSPITAL WITHIN 24 HOURS AFTER YOUR PROCEDURE, BRING THIS SHEET AND YOUR AFTER VISIT SUMMARY WITH YOU AND GIVE IT TO THE PHYSICIAN OR NURSE ATTENDING YOU.             Peripheral Artery Angioplasty: What to Expect at [address] [place] [address]  Peripheral artery angioplasty (say \"dtm-BENZ-gk-rull AR-ter-cheryl BRENNEROOM-jqw-nn-plass-ann\") is a procedure that widens narrowed arteries in the pelvis or legs. Your doctor used a tube called a catheter to find narrowed arteries in your pelvis or legs and then widened them. Your groin or leg may have a bruise or a small lump where the catheter was put in your groin. The area may feel sore for a few days after the procedure. You can do light activities around the house but nothing strenuous for several days. After surgery, blood may flow better throughout your leg. This can decrease leg pain, numbness, and cramping. You will likely have regular checkups with your doctor to check your arteries. This care sheet gives you a general idea about how long it will take for you to recover. But each person recovers at a different pace. Follow the steps below to feel better as quickly as possible. How can you care for yourself at home? Activity    Do not do strenuous exercise and do not lift anything heavy until your doctor says it is okay. This may be for several days. You can walk around the house and do light activity, such as cooking. Go back to regular exercise when your doctor says it is okay. Walking is a good choice. If you work, you will probably need to take 1 or 2 days off. It depends on the type of work you do and how you feel. Diet    You can eat your normal diet. Drink plenty of fluids (unless your doctor tells you not to). Medicines    Your doctor will tell you if and when you can restart your medicines. He or she will also give you instructions about taking any new medicines. If you stopped taking aspirin or some other blood thinner, your doctor will tell you when to start taking it again. Be safe with medicines. Take your medicines exactly as prescribed. Call your doctor if you think you are having a problem with your medicine. Your doctor may prescribe a blood thinner when you go home. This helps prevent blood clots. Be sure you get instructions about how to take your medicine safely. Blood thinners can cause serious bleeding problems. Care of the catheter site    Keep a bandage over the spot where the catheter was inserted for the first day, or for as long as your doctor recommends. Put ice or a cold pack on the area for 10 to 20 minutes at a time to help with soreness or swelling. Do this every few hours. Put a thin cloth between the ice and your skin. You may shower 24 to 48 hours after the procedure, if your doctor okays it. Pat the incision dry. Do not soak the catheter site until it is healed. Don't take a bath for 1 week, or until your doctor tells you it is okay. Watch for bleeding from the site. A small amount of blood (up to the size of a quarter) on the bandage can be normal.     If you are bleeding, lie down and press on the area for 15 minutes to try to make it stop. If the bleeding does not stop, call your doctor or seek immediate medical care. Follow-up care is a key part of your treatment and safety. Be sure to make and go to all appointments, and call your doctor if you are having problems. It's also a good idea to know your test results and keep a list of the medicines you take. When should you call for help? Call 911 anytime you think you may need emergency care. For example, call if:    You passed out (lost consciousness). You have severe trouble breathing. You have sudden chest pain and shortness of breath, or you cough up blood. Your groin is very swollen and you have a lump that is getting bigger under your skin where the catheter was put in. Call your doctor now or seek immediate medical care if:    You have severe pain in your leg, or it becomes cold, pale, blue, tingly, or numb. You are bleeding from the area where the catheter was put in your artery. You have a fast-growing, painful lump at the catheter site.      You have signs of infection, such as:  Increased pain, swelling, warmth, or redness of the skin. Red streaks leading from the area. Pus draining from the area. A fever. Watch closely for changes in your health, and be sure to contact your doctor if you have any problems. Where can you learn more? Go to [email or web address] org and sign in to your [employer] account. Enter B505 in the [employer] box to learn more about \"Peripheral Artery Angioplasty: What to Expect at Home. \"     If you do not have an account, please click on the \"Sign Up Now\" link. Current as of: January 10, 2022               Content Version: 13.4  © 2006-2022 Healthwise, Incorporated. Care instructions adapted under license by [facility] ([facility]). If you have questions about a medical condition or this instruction, always ask your healthcare professional. [place] [address] any warranty or liability for your use of this information.

## 2022-10-03 NOTE — FLOWSHEET NOTE
Patient admitted to 2E09  via wheelchair for peripheral angiogram  Patient NPO. Vital signs obtained. Assessment and data collection intiated. Oriented to room. Policies and procedures for 2E explained. All questions answered with no further questions at this time. Fall prevention and safety precautions discussed with patient. Explained patients right to have family, representative or physician notified of their admission. Patient has Declined for physician to be notified. Patient has Declined for family/representative to be notified.

## 2022-10-04 NOTE — FLOWSHEET NOTE
Discharge instructions reviewed wih pt. Avs reviewed. Questions answered. Iv fluids stopped. Iv catheter removed. Telemetry off. Right groin procedure site stable. Assisted pt with discharge to care core post peripheral angioplasty.

## 2022-10-24 ENCOUNTER — OFFICE VISIT (OUTPATIENT)
Dept: CARDIOTHORACIC SURGERY | Age: 62
End: 2022-10-24
Payer: COMMERCIAL

## 2022-10-24 VITALS
WEIGHT: 155 LBS | SYSTOLIC BLOOD PRESSURE: 141 MMHG | DIASTOLIC BLOOD PRESSURE: 70 MMHG | BODY MASS INDEX: 22.19 KG/M2 | HEART RATE: 99 BPM | HEIGHT: 70 IN

## 2022-10-24 DIAGNOSIS — L08.9 WOUND INFECTION: ICD-10-CM

## 2022-10-24 DIAGNOSIS — T14.8XXA WOUND INFECTION: ICD-10-CM

## 2022-10-24 DIAGNOSIS — I70.245 ATHSCL NATIVE ARTERIES OF LEFT LEG W ULCERATION OTH PRT FOOT (HCC): Primary | ICD-10-CM

## 2022-10-24 PROCEDURE — 3017F COLORECTAL CA SCREEN DOC REV: CPT | Performed by: THORACIC SURGERY (CARDIOTHORACIC VASCULAR SURGERY)

## 2022-10-24 PROCEDURE — G8427 DOCREV CUR MEDS BY ELIG CLIN: HCPCS | Performed by: THORACIC SURGERY (CARDIOTHORACIC VASCULAR SURGERY)

## 2022-10-24 PROCEDURE — G8420 CALC BMI NORM PARAMETERS: HCPCS | Performed by: THORACIC SURGERY (CARDIOTHORACIC VASCULAR SURGERY)

## 2022-10-24 PROCEDURE — 4004F PT TOBACCO SCREEN RCVD TLK: CPT | Performed by: THORACIC SURGERY (CARDIOTHORACIC VASCULAR SURGERY)

## 2022-10-24 PROCEDURE — G8484 FLU IMMUNIZE NO ADMIN: HCPCS | Performed by: THORACIC SURGERY (CARDIOTHORACIC VASCULAR SURGERY)

## 2022-10-24 PROCEDURE — 99213 OFFICE O/P EST LOW 20 MIN: CPT | Performed by: THORACIC SURGERY (CARDIOTHORACIC VASCULAR SURGERY)

## 2022-10-24 RX ORDER — SUCRALFATE 1 G/1
1 TABLET ORAL 4 TIMES DAILY
COMMUNITY

## 2022-10-25 NOTE — PROGRESS NOTES
- Cardiology following  - Serial troponin 4.417>>15.86>>16.308  - Continue ASA, Statin, BB  - 2D ECHO with EF 45-50% with diastolic dysfunction and pulmonary HTN  - LHC today showed Lad prox 90% mid 50% lcx prox 50% rca diffusely diseased 70% normal filling pressures moderate pulmonary htn. She is at risk of contrast induced nephropathy post LHC  - Cardiology recommending CABG vs LAD arthrectomy stent  - CVT consulted placed, awaiting input.   [facility]  [address] [facility] [address]  Dept: [phone]  Dept Fax: [phone]: [phone]    Visit Date: 10/24/2022    Mr. Zoe Cardenas is a 63 y.o.male  who presented for:  No chief complaint on file. HPI:   HPI     Mr. Zoe Cardenas returns to clinic for followup after aortogram, left leg angiogram, and left SFA-Popliteal atherectomy and balloon angioplasty on 10/3/22. He is on ASA/Plavix without any problems    At the time of the procedure, he was noted to have severe bilateral Iliac artery occlusive disease, with 90% bilateral Common Iliac and 99% right external Iliac stenoses. These areas had to angioplastied just to place the working sheath up and over during the left SFA-Pop intervention. He states that his left leg is warmer and stronger, but not back to normal. The left foot wound is smaller and healing according to patient. Current Outpatient Medications:     sucralfate (CARAFATE) 1 GM tablet, Take 1 g by mouth 4 times daily, Disp: , Rfl:     collagenase 250 UNIT/GM ointment, Apply topically daily Apply topically daily to left foot wound on dorsum of foot., Disp: , Rfl:     acetaminophen (TYLENOL) 325 MG tablet, Take 650 mg by mouth every 6 hours as needed for Pain, Disp: , Rfl:     gabapentin (NEURONTIN) 300 MG capsule, Take 300 mg by mouth 3 times daily. , Disp: , Rfl:     clopidogrel (PLAVIX) 75 MG tablet, Take 75 mg by mouth daily, Disp: , Rfl:     aspirin 81 MG EC tablet, Take by mouth, Disp: , Rfl:     cyclobenzaprine (FLEXERIL) 10 MG tablet, Take by mouth, Disp: , Rfl:     lidocaine (LIDODERM) 5 %, Apply topically, Disp: , Rfl:     Menthol, Topical Analgesic, 4 % GEL, Apply topically, Disp: , Rfl:     lisinopril (PRINIVIL;ZESTRIL) 5 MG tablet, Take 1 tablet by mouth daily, Disp: 30 tablet, Rfl: 0    melatonin 3 MG TABS tablet, Take 2 tablets by mouth nightly as needed (sleep), Disp: 10 tablet, Rfl: 0    thiamine 100 MG tablet, Take 1 tablet by mouth daily, Disp: 30 tablet, Rfl: 0    Multiple Vitamin (MULTIVITAMIN) TABS tablet, Take 1 tablet by mouth daily, Disp: 30 tablet, Rfl: 0    pantoprazole (PROTONIX) 20 MG tablet, Take 2 tablets by mouth daily for 30 doses, Disp: 60 tablet, Rfl: 0    No Known Allergies    Past Medical History  Lopez Pearson  has a past medical history of Alcoholic hepatitis without ascites, Anemia, unspecified, Anxiety disorder, Arthritis, 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 with ulceration of other part of foot ([address] Chronic GERD, Cocaine abuse without complication ([address] 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, subsequent encounter ([facility] [address] Difficulty in walking, not elsewhere classified, Family history of tobacco abuse and dependence, Folate-deficiency anemia, Frostbite with tissue necrosis of right foot, subsequent encounter, Gangrene ([address] Gastroesophageal reflux disease without esophagitis, Hyperglycemia, Hyperlipemia, Hypo-osmolality and hyponatremia, Infection of amputation stump, unspecified extremity ([address] Inflammatory polyneuropathy ([address] Major depressive disorder, recurrent episode, moderate ([address] Muscle weakness (generalized), Other idiopathic peripheral autonomic neuropathy, Other injury of unspecified body region, initial encounter, Peripheral vascular angioplasty status, Peripheral vascular disease, unspecified ([address] Phantom limb syndrome with pain ([address] Primary hypertension, Pseudomonas (aeruginosa) (mallei) (pseudomallei) as the cause of diseases classified elsewhere, Severe sepsis without septic shock ([address] Substance induced mood disorder ([address] Superficial frostbite of right foot, initial encounter, Tobacco abuse, Uncomplicated alcohol abuse, Unilateral osteoarthritis of hip, right, Unilateral recurrent inguinal hernia with obstruction but no gangrene, Unspecified soft tissue disorder related to use, overuse and pressure, right ankle and foot, and Unsteadiness on feet. Social History  Noemi Lebron  reports that he has been smoking cigarettes. He has a 70.50 pack-year smoking history. He has never used smokeless tobacco. He reports current alcohol use. He reports that he does not currently use drugs after having used the following drugs: Cocaine. Frequency: 1.00 time per week. Family History  Noemi Lebron family history includes Cancer in his father; Heart Attack in his mother. There is no family history of bicuspid aortic valve, aneurysms, heart transplant, pacemakers, defibrillators, or sudden cardiac death.       Past Surgical History   Past Surgical History:   Procedure Laterality Date    LEG SURGERY Right     aka       Subjective:     Review of Systems  See HPI    Objective:     BP (!) 141/70   Pulse 99   Ht 5' 10\" (1.778 m)   Wt 155 lb (70.3 kg)   BMI 22.24 kg/m²     Wt Readings from Last 3 Encounters:   10/24/22 155 lb (70.3 kg)   10/03/22 154 lb (69.9 kg)   09/29/22 154 lb (69.9 kg)     BP Readings from Last 3 Encounters:   10/24/22 (!) 141/70   10/03/22 129/63   09/26/22 133/69       Physical Exam    Right femoral access site: clean/dry/ nontender; no hematoma  Left lower extremity: foot warm, +DP pulse; no PT pulse      Lab Results   Component Value Date/Time    WBC 9.5 10/03/2022 12:02 PM    RBC 4.86 10/03/2022 12:02 PM    HGB 13.0 10/03/2022 12:02 PM    HCT 39.6 10/03/2022 12:02 PM    MCV 81.5 10/03/2022 12:02 PM    MCH 26.7 10/03/2022 12:02 PM    MCHC 32.8 10/03/2022 12:02 PM    RDW 13.7 06/27/2017 07:34 AM     10/03/2022 12:02 PM    MPV 8.9 10/03/2022 12:02 PM       Lab Results   Component Value Date/Time     01/23/2022 05:47 AM    K 3.5 01/23/2022 05:47 AM    K 3.2 01/11/2022 06:19 AM     01/23/2022 05:47 AM    CO2 19 01/23/2022 05:47 AM    BUN 3 01/23/2022 05:47 AM    LABALBU 2.4 01/15/2022 05:41 AM    CREATININE 0.5 10/03/2022 12:02 PM    CALCIUM 8.4 01/23/2022 05:47 AM LABGLOM >90 10/03/2022 12:02 PM    GLUCOSE 107 01/23/2022 05:47 AM       Lab Results   Component Value Date/Time    ALKPHOS 35 01/15/2022 05:41 AM    ALT 26 01/15/2022 05:41 AM    AST 39 01/15/2022 05:41 AM    PROT 5.6 01/15/2022 05:41 AM    BILITOT 0.5 01/15/2022 05:41 AM    BILIDIR 0.6 01/10/2022 02:30 PM    LABALBU 2.4 01/15/2022 05:41 AM       Lab Results   Component Value Date/Time    MG 1.4 01/20/2022 11:14 AM       Lab Results   Component Value Date    INR 1.00 10/03/2022    INR 1.56 (H) 01/12/2022         No results found for: LABA1C    No results found for: TRIG, HDL, LDLCALC, LDLDIRECT, LABVLDL    Lab Results   Component Value Date/Time    TSH 1.650 06/27/2017 07:34 AM           Assessment/Plan     1. Athscl native arteries of left leg w ulceration oth prt foot ([address]    2. Wound infection      Orders Placed This Encounter   Procedures    IR ANGIOGRAM EXTREMITY LEFT     Good result from left leg angiogram and revascularization of the left SFA-Popliteal arteries. The patient has bilateral iliac artery disease and the anatomy is conducive for endovascular repair with bilateral iliac artery stents. Plan:   Continue ASA/Plavix. Scheduled aortogram, bilateral iliac angiograms, IVUS, and possible endovascular intervention on 11/14/22. No orders of the defined types were placed in this encounter. No follow-ups on file.       Electronically signed by Alistair Gupta MD   10/24/2022 at 8:39 PM EDT

## 2022-11-01 ENCOUNTER — TELEPHONE (OUTPATIENT)
Dept: WOUND CARE | Age: 62
End: 2022-11-01

## 2022-11-01 NOTE — TELEPHONE ENCOUNTER
Referral received from [facility] for patients left foot wound. RN stated patient followed with Dr. Kisha Irwin, but did not want to return.  Scheduled patient with Danny Alston on Monday at 845am.

## 2022-11-04 ENCOUNTER — TELEPHONE (OUTPATIENT)
Dept: WOUND CARE | Age: 62
End: 2022-11-04

## 2022-11-04 NOTE — TELEPHONE ENCOUNTER
Called patient to remind him of his appointment and to see if he could come at 0830 am. 1st number was care core- patient no longer a resident. 2nd number listed- no ring tone.

## 2022-11-07 ENCOUNTER — HOSPITAL ENCOUNTER (OUTPATIENT)
Dept: WOUND CARE | Age: 62
Discharge: HOME | End: 2022-11-07
Payer: COMMERCIAL

## 2022-11-07 VITALS
DIASTOLIC BLOOD PRESSURE: 76 MMHG | HEART RATE: 100 BPM | RESPIRATION RATE: 16 BRPM | OXYGEN SATURATION: 95 % | TEMPERATURE: 97.5 F | SYSTOLIC BLOOD PRESSURE: 156 MMHG

## 2022-11-07 DIAGNOSIS — S91.302A OPEN WOUND OF LEFT FOOT, INITIAL ENCOUNTER: Primary | ICD-10-CM

## 2022-11-07 PROCEDURE — 99203 OFFICE O/P NEW LOW 30 MIN: CPT

## 2022-11-07 PROCEDURE — 17250 CHEM CAUT OF GRANLTJ TISSUE: CPT

## 2022-11-07 PROCEDURE — 6370000000 HC RX 637 (ALT 250 FOR IP): Performed by: NURSE PRACTITIONER

## 2022-11-07 RX ORDER — LIDOCAINE HYDROCHLORIDE 40 MG/ML
SOLUTION TOPICAL ONCE
OUTPATIENT
Start: 2022-11-07 | End: 2022-11-07

## 2022-11-07 RX ORDER — LIDOCAINE 50 MG/G
OINTMENT TOPICAL ONCE
OUTPATIENT
Start: 2022-11-07 | End: 2022-11-07

## 2022-11-07 RX ORDER — BACITRACIN ZINC AND POLYMYXIN B SULFATE 500; 1000 [USP'U]/G; [USP'U]/G
OINTMENT TOPICAL ONCE
Status: CANCELLED | OUTPATIENT
Start: 2022-11-07 | End: 2022-11-07

## 2022-11-07 RX ORDER — LIDOCAINE HYDROCHLORIDE 20 MG/ML
JELLY TOPICAL ONCE
Status: CANCELLED | OUTPATIENT
Start: 2022-11-07 | End: 2022-11-07

## 2022-11-07 RX ORDER — LIDOCAINE 40 MG/G
CREAM TOPICAL ONCE
Status: CANCELLED | OUTPATIENT
Start: 2022-11-07 | End: 2022-11-07

## 2022-11-07 RX ORDER — BACITRACIN ZINC AND POLYMYXIN B SULFATE 500; 1000 [USP'U]/G; [USP'U]/G
OINTMENT TOPICAL ONCE
OUTPATIENT
Start: 2022-11-07 | End: 2022-11-07

## 2022-11-07 RX ORDER — LIDOCAINE HYDROCHLORIDE 20 MG/ML
JELLY TOPICAL ONCE
OUTPATIENT
Start: 2022-11-07 | End: 2022-11-07

## 2022-11-07 RX ORDER — BACITRACIN, NEOMYCIN, POLYMYXIN B 400; 3.5; 5 [USP'U]/G; MG/G; [USP'U]/G
OINTMENT TOPICAL ONCE
Status: CANCELLED | OUTPATIENT
Start: 2022-11-07 | End: 2022-11-07

## 2022-11-07 RX ORDER — CLOBETASOL PROPIONATE 0.5 MG/G
OINTMENT TOPICAL ONCE
OUTPATIENT
Start: 2022-11-07 | End: 2022-11-07

## 2022-11-07 RX ORDER — GINSENG 100 MG
CAPSULE ORAL ONCE
Status: CANCELLED | OUTPATIENT
Start: 2022-11-07 | End: 2022-11-07

## 2022-11-07 RX ORDER — BETAMETHASONE DIPROPIONATE 0.05 %
OINTMENT (GRAM) TOPICAL ONCE
OUTPATIENT
Start: 2022-11-07 | End: 2022-11-07

## 2022-11-07 RX ORDER — BACITRACIN, NEOMYCIN, POLYMYXIN B 400; 3.5; 5 [USP'U]/G; MG/G; [USP'U]/G
OINTMENT TOPICAL ONCE
OUTPATIENT
Start: 2022-11-07 | End: 2022-11-07

## 2022-11-07 RX ORDER — CLOBETASOL PROPIONATE 0.5 MG/G
OINTMENT TOPICAL ONCE
Status: CANCELLED | OUTPATIENT
Start: 2022-11-07 | End: 2022-11-07

## 2022-11-07 RX ORDER — LIDOCAINE HYDROCHLORIDE 40 MG/ML
SOLUTION TOPICAL ONCE
Status: CANCELLED | OUTPATIENT
Start: 2022-11-07 | End: 2022-11-07

## 2022-11-07 RX ORDER — GINSENG 100 MG
CAPSULE ORAL ONCE
OUTPATIENT
Start: 2022-11-07 | End: 2022-11-07

## 2022-11-07 RX ORDER — LIDOCAINE 40 MG/G
CREAM TOPICAL ONCE
OUTPATIENT
Start: 2022-11-07 | End: 2022-11-07

## 2022-11-07 RX ORDER — GENTAMICIN SULFATE 1 MG/G
OINTMENT TOPICAL ONCE
OUTPATIENT
Start: 2022-11-07 | End: 2022-11-07

## 2022-11-07 RX ORDER — BETAMETHASONE DIPROPIONATE 0.05 %
OINTMENT (GRAM) TOPICAL ONCE
Status: CANCELLED | OUTPATIENT
Start: 2022-11-07 | End: 2022-11-07

## 2022-11-07 RX ORDER — GENTAMICIN SULFATE 1 MG/G
OINTMENT TOPICAL ONCE
Status: CANCELLED | OUTPATIENT
Start: 2022-11-07 | End: 2022-11-07

## 2022-11-07 RX ORDER — LIDOCAINE 50 MG/G
OINTMENT TOPICAL ONCE
Status: CANCELLED | OUTPATIENT
Start: 2022-11-07 | End: 2022-11-07

## 2022-11-07 RX ADMIN — SILVER NITRATE APPLICATORS 1 EACH: 25; 75 STICK TOPICAL at 08:58

## 2022-11-07 NOTE — DISCHARGE INSTRUCTIONS
Visit Discharge/Physician Orders:  - Silver nitrate applied to wound today. Home Care: [address]     Wound Location: left foot     Dressing orders:     1) Gather wound care supplies and arrange on clean table. 2) Wash your hands with soap and water or use alcohol based hand  for 20 seconds (sing \"Happy Birthday\" twice). 3) Cleanse wounds with normal saline or wound cleanser and gauze. Pat dry with clean gauze. 4) Left foot- Apply collagen to wound. Moisten with saline. Cover with alginate. Cover with band-aid. Change every other day. Keep all dressings clean & dry. Follow up visit:   3 Weeks on Monday November 28th at 10:30 am    Keep next scheduled appointment. Please give 24 hour notice if unable to keep appointment. [phone]    If you experience any of the following, please call the Wound Care Service during business hours: Monday through Friday 8:00 am - 4:30 pm  ([phone]). *Increase in pain   *Temperature over 101   *Increase in drainage from your wound or a foul odor   *Uncontrolled swelling   *Need for compression bandage changes due to slippage, breakthrough drainage    If you need medical attention outside of business hours, please contact your Primary Care Doctor or go to the nearest emergency room.

## 2022-11-07 NOTE — PLAN OF CARE
Problem: Wound:  Goal: Will show signs of wound healing; wound closure and no evidence of infection  Description: Will show signs of wound healing; wound closure and no evidence of infection  Outcome: Progressing     Patient presents to wound clinic for left foot wound. No s/s of infection noted. See avs for discharge instructions. Follow up visit:   3 Weeks on Monday November 28th at 10:30 am    Care plan reviewed with patient. Patient verbalize understanding of the plan of care and contribute to goal setting.

## 2022-11-07 NOTE — CONSULTS
[id number] [address] and Procedure Note      [address] RECORD NUMBER:  [id number]  AGE: 63 y.o. GENDER: male  : 1960  EPISODE DATE:  2022    Subjective:     Chief Complaint   Patient presents with    Wound Check     Left foot         HISTORY of PRESENT ILLNESS HPI     Leobardo Beck is a 63 y.o. male New patient who presents today for wound/ulcer evaluation for an ongoing wound to the dorsal aspect of left forefoot overlying the first interdigital space. Patient states that this wound has been ongoing since January. Back in January of this year patient suffered severe frostbite to bilateral lower extremities. Patient also developed gangrene with this frostbite and which eventually led to patient undergoing a right above-the-knee amputation by Dr. Scar Calvo at [facility].  Patient has also followed Dr. Addis Gabriel in the past for these ongoing bilateral lower extremity issues. Patient is set for an arterial angiogram next Monday here at [facility]. Patient does have underlying atherosclerotic arterial disease to the left lower extremity. Wound for the most part is granular in nature. Scant serous drainage noted. No probe to bone or proximal streaking of note. No sign of localized infection of note. Patient is not currently on any antibiotics. Patient states that recently he has been treating this with alginate. No debridement was indicated today. Wound is treated with silver nitrate. Wound was then dressed with collagen, alginate, and a Band-Aid. Patient will continue with the same regimen every 2 to 3 days. We will get patient set up with dressing supplies moving forward. Patient will follow back up with us again in 3 weeks for reevaluation. History of Wound Context: Ongoing left dorsal foot wound since .   Wound/Ulcer Pain Timing/Severity: mild  Quality of pain: tender  Severity:  2 / 10   Modifying Factors: Pain Date    LEG SURGERY Right     aka       FAMILY HISTORY    Family History   Problem Relation Age of Onset    Heart Attack Mother     Cancer Father        SOCIAL HISTORY    Social History     Tobacco Use    Smoking status: Every Day     Packs/day: 1.00     Years: 47.00     Pack years: 47.00     Types: Cigarettes    Smokeless tobacco: Never   Substance Use Topics    Alcohol use: Yes     Comment: 4 cans beer/day    Drug use: Not Currently     Frequency: 1.0 times per week     Types: Cocaine       ALLERGIES    No Known Allergies    MEDICATIONS    Current Outpatient Medications on File Prior to Encounter   Medication Sig Dispense Refill    sucralfate (CARAFATE) 1 GM tablet Take 1 g by mouth 4 times daily      collagenase 250 UNIT/GM ointment Apply topically daily Apply topically daily to left foot wound on dorsum of foot. acetaminophen (TYLENOL) 325 MG tablet Take 650 mg by mouth every 6 hours as needed for Pain      gabapentin (NEURONTIN) 300 MG capsule Take 300 mg by mouth 3 times daily. clopidogrel (PLAVIX) 75 MG tablet Take 75 mg by mouth daily      aspirin 81 MG EC tablet Take by mouth      cyclobenzaprine (FLEXERIL) 10 MG tablet Take by mouth      lidocaine (LIDODERM) 5 % Apply topically      Menthol, Topical Analgesic, 4 % GEL Apply topically      lisinopril (PRINIVIL;ZESTRIL) 5 MG tablet Take 1 tablet by mouth daily 30 tablet 0    melatonin 3 MG TABS tablet Take 2 tablets by mouth nightly as needed (sleep) 10 tablet 0    pantoprazole (PROTONIX) 20 MG tablet Take 2 tablets by mouth daily for 30 doses 60 tablet 0    thiamine 100 MG tablet Take 1 tablet by mouth daily 30 tablet 0    Multiple Vitamin (MULTIVITAMIN) TABS tablet Take 1 tablet by mouth daily 30 tablet 0     No current facility-administered medications on file prior to encounter. REVIEW OF SYSTEMS    Pertinent items are noted in HPI.     Objective:      BP (!) 156/76   Pulse 100   Temp 97.5 °F (36.4 °C) (Infrared)   Resp 16   SpO2 95% Wt Readings from Last 3 Encounters:   10/24/22 155 lb (70.3 kg)   10/03/22 154 lb (69.9 kg)   09/29/22 154 lb (69.9 kg)       PHYSICAL EXAM    General Appearance: alert and oriented to person, place and time, well developed and well- nourished, in no acute distress. Skin: Patient is found to have a granular wound to the dorsal aspect of the left forefoot overlying the first interdigital space. Wound is measuring approximately 0.6 x 0.8 with a depth of 0.2 cm. Wound is granular nature. No debridement was indicated. There is no probe to bone or proximal streaking of note. Scant serous drainage noted. No malodor of note. No evidence of localized infection noted. Granular tissue was treated with silver nitrate. Vascular: Pedal pulses are faintly palpable. Skin temperature is warm to cool from proximal tibia to distal digits of the left lower extremity. No edema noted today. Musculoskeletal: Overall alignment of the left lower extremity is rectus in nature. Patient denies pain to the left lower extremity. Patient has a prior history of a right above-the-knee amputation. Neurologic: Epicritic and protopathic sensations are grossly diminished to the left lower extremity. Puncture 01/17/22 Femoral Anterior;Left;Proximal (Active)   Number of days: 293       Wound 01/11/22 Toe (Comment  which one) Anterior;Right (Active)   Number of days: 300       Wound 01/11/22 Foot Anterior; Left (Active)   Wound Image   11/07/22 0842   Dressing Status Intact; Old drainage noted;New dressing applied 11/07/22 0842   Wound Cleansed Cleansed with saline 11/07/22 0842   Offloading for Diabetic Foot Ulcers Offloading not required 11/07/22 0842   Wound Length (cm) 0.6 cm 11/07/22 0842   Wound Width (cm) 0.8 cm 11/07/22 0842   Wound Depth (cm) 0.2 cm 11/07/22 0842   Wound Surface Area (cm^2) 0.48 cm^2 11/07/22 0842   Wound Volume (cm^3) 0.096 cm^3 11/07/22 0842   Wound Assessment Fibrinous;Graft 11/07/22 0842 Drainage Amount Small 11/07/22 0842   Drainage Description Yellow 11/07/22 0842   Odor None 11/07/22 0842   Nelli-wound Assessment Dry/flaky 11/07/22 0842   Margins Attached edges 11/07/22 0842   Wound Thickness Description not for Pressure Injury Full thickness 11/07/22 0842   Number of days: 300       LABS       CBC:   Lab Results   Component Value Date/Time    WBC 9.5 10/03/2022 12:02 PM    HGB 13.0 10/03/2022 12:02 PM    HCT 39.6 10/03/2022 12:02 PM    MCV 81.5 10/03/2022 12:02 PM     10/03/2022 12:02 PM     BMP:   Lab Results   Component Value Date/Time     01/23/2022 05:47 AM    K 3.5 01/23/2022 05:47 AM    K 3.2 01/11/2022 06:19 AM     01/23/2022 05:47 AM    CO2 19 01/23/2022 05:47 AM    PHOS 2.6 01/15/2022 05:41 AM    BUN 3 01/23/2022 05:47 AM    CREATININE 0.5 10/03/2022 12:02 PM     PT/INR:   Lab Results   Component Value Date/Time    INR 1.00 10/03/2022 12:02 PM     Prealbumin: No results found for: PREALBUMIN  Albumin:  Lab Results   Component Value Date/Time    LABALBU 2.4 01/15/2022 05:41 AM     Sed Rate:No results found for: [place]  CRP:   Lab Results   Component Value Date/Time    CRP 12.22 01/10/2022 02:30 PM     Micro:   Lab Results   Component Value Date/Time    BC No growth-preliminary No growth 01/10/2022 05:20 PM      Hemoglobin A1C: No results found for: LABA1C    Assessment:     Ulcer Identification:  Ulcer Type: arterial and neuropathic  Contributing Factors: arterial insufficiency    Wound:  Frostbite wound    Depth of Diabetic/Pressure/Non Pressure Ulcers or Wound:  Non-Pressure ulcer, fat layer exposed    Patient Active Problem List   Diagnosis Code    Tobacco dependence F17.200    Substance induced mood disorder (HCC) F19.94    Wound infection T14. 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 ([address] I70.245       Procedure Note    Chemical Cautery - 96569    Performed by: SUSI Hsu - CNP  Consent obtained: Yes  Time out taken: Yes  Tissue Type: Hypergranulation  Method: silver nitrate    Location of Chemical Cautery:   Wound/Ulcer #1     Puncture 01/17/22 Femoral Anterior;Left;Proximal (Active)   Number of days: 293       Wound 01/11/22 Toe (Comment  which one) Anterior;Right (Active)   Number of days: 300       Wound 01/11/22 Foot Anterior; Left (Active)   Wound Image   11/07/22 0842   Dressing Status Intact; Old drainage noted;New dressing applied 11/07/22 0842   Wound Cleansed Cleansed with saline 11/07/22 0842   Offloading for Diabetic Foot Ulcers Offloading not required 11/07/22 0842   Wound Length (cm) 0.6 cm 11/07/22 0842   Wound Width (cm) 0.8 cm 11/07/22 0842   Wound Depth (cm) 0.2 cm 11/07/22 0842   Wound Surface Area (cm^2) 0.48 cm^2 11/07/22 0842   Wound Volume (cm^3) 0.096 cm^3 11/07/22 0842   Wound Assessment Fibrinous;Graft 11/07/22 0842   Drainage Amount Small 11/07/22 0842   Drainage Description Yellow 11/07/22 0842   Odor None 11/07/22 0842   Nelli-wound Assessment Dry/flaky 11/07/22 0842   Margins Attached edges 11/07/22 0842   Wound Thickness Description not for Pressure Injury Full thickness 11/07/22 0842   Number of days: 300        Procedural Pain: 1  / 10   Post Procedural Pain: 1 / 10   Response to treatment: Well tolerated by patient. Plan:     Patient examined and evaluated behalf of Dr. Kaylee Villanueva today. Patient is found to have an ongoing wound to the dorsal aspect of left forefoot overlying the first interdigital space since January of this year. Initial wound was a direct result from frostbite which she had to bilateral lower extremities. Patient does have a prior history of a right above-the-knee amputation. Residual wound was treated with silver nitrate today. Wound is then dressed with collagen, alginate, and a large Band-Aid. Patient will continue with this every 2 to 3 days going forward. Dressing supplies were ordered today.   Patient is set to have an angiogram next Monday on the left lower

## 2022-11-14 ENCOUNTER — HOSPITAL ENCOUNTER (OUTPATIENT)
Dept: INTERVENTIONAL RADIOLOGY/VASCULAR | Age: 62
Discharge: HOME | End: 2022-11-14
Attending: THORACIC SURGERY (CARDIOTHORACIC VASCULAR SURGERY) | Admitting: THORACIC SURGERY (CARDIOTHORACIC VASCULAR SURGERY)
Payer: COMMERCIAL

## 2022-11-14 VITALS
OXYGEN SATURATION: 99 % | TEMPERATURE: 97.9 F | BODY MASS INDEX: 21.19 KG/M2 | HEIGHT: 70 IN | RESPIRATION RATE: 15 BRPM | SYSTOLIC BLOOD PRESSURE: 166 MMHG | WEIGHT: 148 LBS | HEART RATE: 91 BPM | DIASTOLIC BLOOD PRESSURE: 72 MMHG

## 2022-11-14 DIAGNOSIS — I73.9 PVD (PERIPHERAL VASCULAR DISEASE) (HCC): Primary | ICD-10-CM

## 2022-11-14 LAB
ACTIVATED CLOTTING TIME: 225 SECONDS (ref 1–150)
ACTIVATED CLOTTING TIME: 242 SECONDS (ref 1–150)
ACTIVATED CLOTTING TIME: 260 SECONDS (ref 1–150)
ANION GAP SERPL CALCULATED.3IONS-SCNC: 12 MEQ/L (ref 8–16)
APTT: 31.8 SECONDS (ref 22–38)
BUN BLDV-MCNC: 5 MG/DL (ref 7–22)
CALCIUM SERPL-MCNC: 9.8 MG/DL (ref 8.5–10.5)
CHLORIDE BLD-SCNC: 96 MEQ/L (ref 98–111)
CO2: 23 MEQ/L (ref 23–33)
CREAT SERPL-MCNC: 0.5 MG/DL (ref 0.4–1.2)
ERYTHROCYTE [DISTWIDTH] IN BLOOD BY AUTOMATED COUNT: 17.2 % (ref 11.5–14.5)
ERYTHROCYTE [DISTWIDTH] IN BLOOD BY AUTOMATED COUNT: 53.2 FL (ref 35–45)
GFR SERPL CREATININE-BSD FRML MDRD: > 60 ML/MIN/1.73M2
GLUCOSE BLD-MCNC: 87 MG/DL (ref 70–108)
HCT VFR BLD CALC: 39.1 % (ref 42–52)
HEMOGLOBIN: 13.2 GM/DL (ref 14–18)
INR BLD: 1.03 (ref 0.85–1.13)
MCH RBC QN AUTO: 28.6 PG (ref 26–33)
MCHC RBC AUTO-ENTMCNC: 33.8 GM/DL (ref 32.2–35.5)
MCV RBC AUTO: 84.6 FL (ref 80–94)
PLATELET # BLD: 325 THOU/MM3 (ref 130–400)
PMV BLD AUTO: 9.2 FL (ref 9.4–12.4)
POTASSIUM SERPL-SCNC: 4.5 MEQ/L (ref 3.5–5.2)
RBC # BLD: 4.62 MILL/MM3 (ref 4.7–6.1)
SODIUM BLD-SCNC: 131 MEQ/L (ref 135–145)
WBC # BLD: 10.2 THOU/MM3 (ref 4.8–10.8)

## 2022-11-14 PROCEDURE — 2709999900 IR ANGIOGRAM EXTREMITY BILATERAL

## 2022-11-14 PROCEDURE — 37253 INTRVASC US NONCORONARY ADDL: CPT

## 2022-11-14 PROCEDURE — 85730 THROMBOPLASTIN TIME PARTIAL: CPT

## 2022-11-14 PROCEDURE — 85347 COAGULATION TIME ACTIVATED: CPT

## 2022-11-14 PROCEDURE — 37223 HC PLASTY ILIAC W STENT EA ADDL: CPT

## 2022-11-14 PROCEDURE — 37252 INTRVASC US NONCORONARY 1ST: CPT

## 2022-11-14 PROCEDURE — 2580000003 HC RX 258: Performed by: THORACIC SURGERY (CARDIOTHORACIC VASCULAR SURGERY)

## 2022-11-14 PROCEDURE — 37221 HC PLASTY ILIAC W STENT: CPT

## 2022-11-14 PROCEDURE — 6360000002 HC RX W HCPCS: Performed by: THORACIC SURGERY (CARDIOTHORACIC VASCULAR SURGERY)

## 2022-11-14 PROCEDURE — 85610 PROTHROMBIN TIME: CPT

## 2022-11-14 PROCEDURE — 6360000004 HC RX CONTRAST MEDICATION: Performed by: THORACIC SURGERY (CARDIOTHORACIC VASCULAR SURGERY)

## 2022-11-14 PROCEDURE — 80048 BASIC METABOLIC PNL TOTAL CA: CPT

## 2022-11-14 PROCEDURE — C1876 STENT, NON-COA/NON-COV W/DEL: HCPCS

## 2022-11-14 PROCEDURE — 75716 ARTERY X-RAYS ARMS/LEGS: CPT

## 2022-11-14 PROCEDURE — 85027 COMPLETE CBC AUTOMATED: CPT

## 2022-11-14 PROCEDURE — 36415 COLL VENOUS BLD VENIPUNCTURE: CPT

## 2022-11-14 RX ORDER — MIDAZOLAM HYDROCHLORIDE 1 MG/ML
INJECTION INTRAMUSCULAR; INTRAVENOUS
Status: COMPLETED | OUTPATIENT
Start: 2022-11-14 | End: 2022-11-14

## 2022-11-14 RX ORDER — FENTANYL CITRATE 50 UG/ML
INJECTION, SOLUTION INTRAMUSCULAR; INTRAVENOUS
Status: COMPLETED | OUTPATIENT
Start: 2022-11-14 | End: 2022-11-14

## 2022-11-14 RX ORDER — SODIUM CHLORIDE 0.9 % (FLUSH) 0.9 %
5-40 SYRINGE (ML) INJECTION PRN
Status: DISCONTINUED | OUTPATIENT
Start: 2022-11-14 | End: 2022-11-14 | Stop reason: HOSPADM

## 2022-11-14 RX ORDER — SODIUM CHLORIDE 9 MG/ML
INJECTION, SOLUTION INTRAVENOUS CONTINUOUS
Status: DISCONTINUED | OUTPATIENT
Start: 2022-11-14 | End: 2022-11-14 | Stop reason: HOSPADM

## 2022-11-14 RX ORDER — SODIUM CHLORIDE 9 MG/ML
INJECTION, SOLUTION INTRAVENOUS PRN
Status: DISCONTINUED | OUTPATIENT
Start: 2022-11-14 | End: 2022-11-14 | Stop reason: HOSPADM

## 2022-11-14 RX ORDER — HEPARIN SODIUM 1000 [USP'U]/ML
INJECTION, SOLUTION INTRAVENOUS; SUBCUTANEOUS
Status: COMPLETED | OUTPATIENT
Start: 2022-11-14 | End: 2022-11-14

## 2022-11-14 RX ORDER — LISINOPRIL 10 MG/1
10 TABLET ORAL DAILY
COMMUNITY

## 2022-11-14 RX ORDER — AMITRIPTYLINE HYDROCHLORIDE 50 MG/1
50 TABLET, FILM COATED ORAL NIGHTLY
COMMUNITY

## 2022-11-14 RX ORDER — SODIUM CHLORIDE 0.9 % (FLUSH) 0.9 %
5-40 SYRINGE (ML) INJECTION EVERY 12 HOURS SCHEDULED
Status: DISCONTINUED | OUTPATIENT
Start: 2022-11-14 | End: 2022-11-14 | Stop reason: HOSPADM

## 2022-11-14 RX ORDER — OXYCODONE HYDROCHLORIDE 5 MG/1
5 TABLET ORAL EVERY 4 HOURS PRN
Status: DISCONTINUED | OUTPATIENT
Start: 2022-11-14 | End: 2022-11-14 | Stop reason: HOSPADM

## 2022-11-14 RX ORDER — ONDANSETRON 4 MG/1
4 TABLET, ORALLY DISINTEGRATING ORAL EVERY 8 HOURS PRN
Status: DISCONTINUED | OUTPATIENT
Start: 2022-11-14 | End: 2022-11-14 | Stop reason: HOSPADM

## 2022-11-14 RX ORDER — PROTAMINE SULFATE 10 MG/ML
INJECTION, SOLUTION INTRAVENOUS
Status: COMPLETED | OUTPATIENT
Start: 2022-11-14 | End: 2022-11-14

## 2022-11-14 RX ORDER — ONDANSETRON 2 MG/ML
4 INJECTION INTRAMUSCULAR; INTRAVENOUS EVERY 6 HOURS PRN
Status: DISCONTINUED | OUTPATIENT
Start: 2022-11-14 | End: 2022-11-14 | Stop reason: HOSPADM

## 2022-11-14 RX ADMIN — MIDAZOLAM 1 MG: 1 INJECTION INTRAMUSCULAR; INTRAVENOUS at 11:48

## 2022-11-14 RX ADMIN — FENTANYL CITRATE 50 MCG: 50 INJECTION, SOLUTION INTRAMUSCULAR; INTRAVENOUS at 13:12

## 2022-11-14 RX ADMIN — MIDAZOLAM 1 MG: 1 INJECTION INTRAMUSCULAR; INTRAVENOUS at 12:58

## 2022-11-14 RX ADMIN — FENTANYL CITRATE 50 MCG: 50 INJECTION, SOLUTION INTRAMUSCULAR; INTRAVENOUS at 11:50

## 2022-11-14 RX ADMIN — SODIUM CHLORIDE: 9 INJECTION, SOLUTION INTRAVENOUS at 08:59

## 2022-11-14 RX ADMIN — IOPAMIDOL 120 ML: 612 INJECTION, SOLUTION INTRAVENOUS at 13:54

## 2022-11-14 RX ADMIN — MIDAZOLAM 1 MG: 1 INJECTION INTRAMUSCULAR; INTRAVENOUS at 12:00

## 2022-11-14 RX ADMIN — MIDAZOLAM 1 MG: 1 INJECTION INTRAMUSCULAR; INTRAVENOUS at 13:12

## 2022-11-14 RX ADMIN — FENTANYL CITRATE 50 MCG: 50 INJECTION, SOLUTION INTRAMUSCULAR; INTRAVENOUS at 13:42

## 2022-11-14 RX ADMIN — Medication 15 MG: at 13:55

## 2022-11-14 RX ADMIN — FENTANYL CITRATE 50 MCG: 50 INJECTION, SOLUTION INTRAMUSCULAR; INTRAVENOUS at 12:01

## 2022-11-14 RX ADMIN — HEPARIN SODIUM 2500 UNITS: 1000 INJECTION INTRAVENOUS; SUBCUTANEOUS at 12:28

## 2022-11-14 RX ADMIN — MIDAZOLAM 1 MG: 1 INJECTION INTRAMUSCULAR; INTRAVENOUS at 13:42

## 2022-11-14 RX ADMIN — FENTANYL CITRATE 50 MCG: 50 INJECTION, SOLUTION INTRAMUSCULAR; INTRAVENOUS at 12:59

## 2022-11-14 RX ADMIN — HEPARIN SODIUM 7000 UNITS: 1000 INJECTION INTRAVENOUS; SUBCUTANEOUS at 12:14

## 2022-11-14 NOTE — FLOWSHEET NOTE
Return to room post procedure   Awake and alert   Site check done , see flowsheet   Denies complaints

## 2022-11-14 NOTE — DISCHARGE INSTRUCTIONS
Discharge Instructions for Femoral access procedures  Take it easy for 3-4 days, limit vigorous activity (contact sports) for 2 weeks  No driving for 2 days  No lifting over 5 lbs for 1 week, this is a half gallon of milk  May shower after 48 hours  May remove dressing in 48 hours and leave off. No creams, ointments, or powders near site for 2 weeks. No hot tub, swimming or tub bath for 1 week  Gently clean your site daily using soap and water while standing in the shower. Dry thoroughly. 10. Monitor site for infection- redness, increased pain, hardness or drainage at site, elevated temperature, poor healing of incision, fever, chills. 11. If bleeding from incision, apply pressure and call 911 immediately.

## 2022-11-14 NOTE — OP NOTE
Operative Note      Patient: Yaya Chahal  YOB: 1960  MRN: [id number]    Date of Procedure: 11/14/2022    Pre-Op Diagnosis:   Bilateral lower extremity severe claudication  Bilateral aorto-iliac occlusive disease  S/P Left SFA/Popliteal atherectomy and balloon angioplasty/atherectomy 10/3/2022  Left plantar foot open chronic wound  Rest pain left lower extremity  Heavy tobacco use  ETOH abuse  Known PAD  HTN  S/P Right AKA      Post-Op Diagnosis: Same plus:    99% Right External Iliac artery stenosis  90% ostial Right and Left Common Iliac stenosis  70% Left External Iliac artery stenosis     Procedure:    USG guidance, Right Common Femoral artery  USG guidance, Left Common Femoral artery  Bilateral Aorto-Iliac angiograms  IVUS Aorta  IVUS Bilateral Common and External Iliac arteries  Stent to Right Common Iliac artery with 8 mm x 57 EV3 Balloon Expandable stent  Stent to Left Common Iliac artery with 8 mm x 27 EV3 Balloon Expandable stent  Stent to Right External Iliac artery with 9 mm x 60 EV3  Stent to Left External Iliac artery with 9 mm x 40 EV3    Anesthesia:  Versed 5 mg  Fentanyl 250 mcg  Lidocaine local anesthesia    Estimated Blood Loss (mL): less than 50     Complications: None    Specimens:   * No specimens in log *    Implants:  Implant Name Type Inv.  Item Serial No.  Lot No. LRB No. Used Action   STENT PERIPH L57MM DIA8MM BLLN L60MM SHTH 6FR 0.035IN Nina Jetty  STENT PERIPH L57MM DIA8MM [facility] 6FR 0.1IN S STL  [employer] COVIDIEN EV3-WD [id number] Right 1 Implanted   STENT BILI PROTEGE EVERFLEX [id number] - [id number] Stent:Biliary/Pancreatic/Iliac STENT BILI PROTEGE EVERFLEX [id number]  [employer] [place] [employer]-PMM [id number] Right 1 Implanted   STENT PERIPH L27MM DIA8MM CATH L80MM 6FR 0.035IN S STL SELF - [id number] Peripheral stents STENT PERIPH L27MM DIA8MM CATH L80MM 6FR 0.035IN S STL SELF  [employer] COVIDIEN EV3-WD [id number] Left 1 Implanted   STENT PROTEGE GPS [facility] UNIT - [id number] Stent:Biliary/Pancreatic/Iliac STENT PROTEGE GPS [facility] UNIT  [employer] [place] [employer]-PMM [id number] Left 1 Implanted         Drains: * No LDAs found *    Findings:   99% Right External Iliac artery stenosis  90% ostial Right and Left Common Iliac stenosis  70% Left External Iliac artery stenosis     Procedure Note:     The patient was seen and positively identified in the preoperative holding area. Informed consent was verified, and all preoperative workup and imaging and laboratory values were reviewed. The procedure to be undertaken was reviewed and explained to the patient again. All risks/possible complications, possible alternatives were discussed, as was the general intraoperative and postoperative course of this procedure. All questions were answered to the patient's satisfaction. The patient was then taken to the IR suite and placed supine on the operative table. Moderate Anesthesia was commenced. The patient was then prepped and draped in the usual sterile fashion in both femoral areas. Time out was called and verified. Ultrasound evaluation was used to evaluate the different access sites for the angiography to be performed. Ultrasound was used to visualize the right common femoral artery, and duplex was used to demonstrate flow and vessel patent. Ultrasound was used to perform real-time imagery of needle advancement into the right common femoral artery. U/S guided access was used to place a 5 Western Silva micropuncture catheter into the right common femoral artery. Angiography was performed which confirmed placement in the common femoral artery. An 0.035 guide-wire was advanced into the micro puncture catheter, which was exchanged for a 5-Brazilian sheath. Subsequently a catheter was directed into the aorta to the level of the renal arteries, and an angiography of the aorta was performed.  The catheter was manipulated down to the level of the aortic bifurcation, and an angiogram was performed of the hypogastric and common femoral and  profunda femoris vessels. ANGIOGRAPHIC FINDINGS:     Aorta widely patent  99% Right External Iliac artery stenosis  90% ostial Right and Left Common Iliac stenosis  Occlusion Left Hypogastric artery  70% Left External Iliac artery stenosis   Bilateral Common Femoral arteries patent    Thus, given the above findings and the patient's symptomatology, a decision was made to intervene. Thus,  the patient was systemically heparinized. ACT levels were checked periodically to ensure the ACT was above 250 seconds. Thus, given the need to place bilateral iliac stents to the level of the distal aorta and raise the bifurcation, the next step was  to cannulate the left common femoral artery. Thus, ultrasound was used to perform real-time imagery of needle advancement into the left common femoral artery. U/S guided access was used to place a 5 Western Silva micropuncture catheter into the left common femoral artery. Angiography was performed which confirmed placement in the left common femoral artery. An 0.035 guide-wire was advanced into the micro puncture catheter, which was exchanged for a 8-Lithuanian sheath. Then next, the 5F sheath on the right side was exchanged to a 6F sheath and through the right 6F sheath, an 0.018 IVUS catheter was brought to the field, and then and IVUS of the aorta and right  iliac arteries were performed. Then next, using USG, the left common femoral artery was seen and US guided access was performed on the left common femoral artery and a 5F sheath was placed. Angiography confirmed good placement and good anatomy for a closure device. Then next, the  5F sheath on the left side was exchanged to a 6F sheath and through the left  6F sheath, an 0.018 IVUS catheter was brought to the field, and then and IVUS of the aorta and left iliac arteries were performed.       IVUS FINDINGS:     Aorta 12-14 mm  Right Common Iliac artery ostium 10 mm, then 4 mm immediately distal  Right External Iliac artery 9 mm just below hypogastric artery, then occluded for ~30 mm and then 9 mm just above sheath    Left Common Iliac artery 4 mm at ostium, then 10 mm. Left Hypogastric artery occluded  Left External Iliac artery 4 mm ~30 mm in length     Thus, once the sizes of the native aorta and right iliac arteries were noted, stenting was required bilaterally. Thus, on both sides, over a catheter, the 0.018 wires were exchanged to 0.035 Amplatz wires. Over the Amplatz wire, on the right, 6Fx 11 cm sheath was exchanged to 6F x 25 cm sheath. The sheath was advanced to the aorta, and a 8 mm x 57 EV3 balloon expandable stent was placed, and the sheath withdrawn to the EIA. The stent was then deployed, and then further expanded by a 10 mm x 60 balloon to the final size of 10 mm. Next, the 6F x 25 cm sheath was exchanged back to a 6F x 11 cm sheath, and next, the right EIA stent, which was a 9 mm x 60 self expanding stent was placed, and post-dilated with a 8 mm balloon. Next, on the left side, the left Common Iliac artery was stented with a 8 mm x 27 EV3 balloon expandable stent, which was post-dilated with a 10 mm balloon. The left external iliac artery was stented with a 9 mm x 40 stent and post-dilated with a 8 mm balloon. Completion angiogram revealed excellent result bilaterally with less than 10% stenosis and good outflow to the legs bilaterally. Thus, all wires and balloons were removed. The 6F sheaths bilaterally were exchanged to 6F  Angioseal Vascular Closure Devices. Good seal and hemostasis noted bilaterally. Dry sterile dressing applied, and the patient tolerated the procedure well. IMPRESSION: Successful Aorto-iliac stents placed for Bilateral Common and External Iliac artery severe occlusive disease. PLAN:  Bedrest x 4 hours and then d/c home if stable. Resume ASA and Plavix daily.   Resume wound care on left foot and other home medications. F/U in Vascular clinic in 2 weeks.

## 2022-11-14 NOTE — PROGRESS NOTES
46 Pt in specials radiology for left leg angiogram with possible intervention. Discussed procedure with pt and pt verbalizes understanding. Pt has right AKA. Left lower leg with dry scaly skin and band-aid intact to anterior part of foot above toes. 1 Dr Steven Mendoza here. 1146 Pt moved to table and bilateral groins prepped and draped. 18 Dr Steven Mendoza to start procedure. 1213 5 fr sheaths intact in bilateral groins. 1222 ACT drawn. 1635 [place] catheter. 1228 . Dr Steven Mendoza informed. 21  for intervention. 1253 8 mm x 57 mm Visi-Pro stent deployed in right common iliac artery. 1255 Stent post dilated with 10 x 60 Drake 35 balloon. 1300 ACT drawn. 1303 Angioplasty of right external iliac artery with 8 x 60 Drake 35 balloon. 1306 . Dr Steven Mendoza informed. 1308 9 mm x 60 mm Protege Stent deployed in right external iliac artery per Dr Steven Mendoza.  1311 Stent post dilated with 8 x 60 Drake 35 balloon. 1325 8 mmm x 27 mm Visi-Pro stent deployed in left common iliac artery. 1328 Stent post dilated with 10 x 60 Drake 35 balloon. 1336 9 mm x 40 mm Protege stent deployed in left external iliac artery. 1338 ACT drawn. 1340 Stent post dilated with 8 x 60 Drake 35 balloon. 1343 . Dr Steven Mendoza notified. 1344 Procedure complete. Prepping for bilateral arterial closure. 1346 Angio-seal right femoral artery complete. 46 Angio-seal left femoral artery complete. Incisions bilateral groins approximated with suture. 1350 4 x 4's with foam tape to sites. Sites without redness, swelling or hematoma. 1405 Pt positioned on bed for comfort. Report called to [employer]. 1408 Transferred to 2E per bed.

## 2022-11-14 NOTE — PROGRESS NOTES
0900  Pt admitted to  2E15 per his own wheelchair for angiogram lower extremity . Pt NPO. Patient unaccompanied  Vital signs obtained. Assessment and data collection initiated. Oriented to room. Policies and procedures for 2E explained   All questions answered with no further questions at this time. Fall prevention and safety precautions discussed with patient. Care plan reviewed with patient. Patient verbalize understanding of the plan of care and contribute to goal setting.      \"Very unsure of his home meds\"   States they are delivered to him in mail   List of medications obtained from [facility] home delivery pharmacy   1115  to procedure per bed

## 2022-11-15 NOTE — FLOWSHEET NOTE
11/14/22 1830   Fall Risk Interventions   [employer] Visual Checks Awake  (sitting in wheelchair in room)   Patient returned from smoking and is watching TV. Seems much more calm. Waiting for his ride.

## 2022-11-15 NOTE — FLOWSHEET NOTE
11/14/22 1750   Fall Risk Interventions   [employer]ourly Visual Checks Awake   Patient came out in to the cotton in his wheelchair fully dressed. States that he is leaving. Informed that the ambulette was scheduled for 8pm.  He says he isn't waiting and needs to go smoke. Dr. Singh Males called and informed of what was going on. Patient insisted on smoking and said he would just catch the bus home. Patient then agreed if he could go out and smoke that he would come back and wait for his ride.

## 2022-11-15 NOTE — PROGRESS NOTES
Discharge teaching and instructions completed with patient using teachback method. AVS reviewed. Patient voiced understanding regarding prescriptions, follow up appointments, and care of self at home.  Patient upset that he is not leaving until [facility] Discharged in his wheelchair to home by [facility]

## 2022-11-28 ENCOUNTER — TELEPHONE (OUTPATIENT)
Dept: WOUND CARE | Age: 62
End: 2022-11-28

## 2022-11-28 ENCOUNTER — OFFICE VISIT (OUTPATIENT)
Dept: CARDIOTHORACIC SURGERY | Age: 62
End: 2022-11-28
Payer: COMMERCIAL

## 2022-11-28 VITALS
BODY MASS INDEX: 23.63 KG/M2 | HEIGHT: 66 IN | SYSTOLIC BLOOD PRESSURE: 127 MMHG | DIASTOLIC BLOOD PRESSURE: 76 MMHG | WEIGHT: 147 LBS | HEART RATE: 92 BPM

## 2022-11-28 DIAGNOSIS — I73.9 PVD (PERIPHERAL VASCULAR DISEASE) WITH CLAUDICATION (HCC): Primary | ICD-10-CM

## 2022-11-28 PROCEDURE — 4004F PT TOBACCO SCREEN RCVD TLK: CPT | Performed by: THORACIC SURGERY (CARDIOTHORACIC VASCULAR SURGERY)

## 2022-11-28 PROCEDURE — 99214 OFFICE O/P EST MOD 30 MIN: CPT | Performed by: THORACIC SURGERY (CARDIOTHORACIC VASCULAR SURGERY)

## 2022-11-28 PROCEDURE — G8420 CALC BMI NORM PARAMETERS: HCPCS | Performed by: THORACIC SURGERY (CARDIOTHORACIC VASCULAR SURGERY)

## 2022-11-28 PROCEDURE — 3017F COLORECTAL CA SCREEN DOC REV: CPT | Performed by: THORACIC SURGERY (CARDIOTHORACIC VASCULAR SURGERY)

## 2022-11-28 PROCEDURE — G8484 FLU IMMUNIZE NO ADMIN: HCPCS | Performed by: THORACIC SURGERY (CARDIOTHORACIC VASCULAR SURGERY)

## 2022-11-28 PROCEDURE — G8427 DOCREV CUR MEDS BY ELIG CLIN: HCPCS | Performed by: THORACIC SURGERY (CARDIOTHORACIC VASCULAR SURGERY)

## 2022-11-28 NOTE — TELEPHONE ENCOUNTER
1030: Cardiology clinic called to report that patient was in their clinic for a visit at this time. She wanted to advise that patient would likely be late to wound clinic appointment which is scheduled for 1030. Advised that patient is the last scheduled appointment of the morning for [facility] and that the provider is only here in the morning as he has another out of town clinic in the afternoon he goes to. If patient is less than 15 minutes late we could see him but otherwise we will likely need to reschedule. She voices understanding. She will have patient call prior to coming down to wound clinic for appointment to see if provider will be able to see him.

## 2022-11-28 NOTE — TELEPHONE ENCOUNTER
Called patient to reschedule appointment in 2 weeks since provider had to leave for clinic in [facility]. Was unable to leave [facility].

## 2022-11-28 NOTE — TELEPHONE ENCOUNTER
Patient arrived 42 minutes late to his appointment. Patient stated he had an appointment at another clinic. Explained to the patient that Jerrod Gonzalez has another clinic to be to and he was already gone for the day. Updated patient that this was reviewed with Jerrod Gonzalez prior to him leaving and Jerrod Gonzalez wanted to reschedule the patient for 2 weeks out. Patient did not want to be re scheduled he stated he is managing his foot wound. Neo updated.

## 2022-11-30 NOTE — PROGRESS NOTES
[facility] SURGERY  [address] [facility] [address]  Dept: [phone]  Dept Fax: [phone]: [phone]    Visit Date: 11/28/2022    Mr. Padma Costello is a 63 y.o.male  who presented for:  Chief Complaint   Patient presents with    Follow-up     F/u post blaise angio       HPI:   HPI     Mr. Padma Costello returns to clinic for followup after recent vascular interventions. He had a left SFA/Pop atherectomy/PTA on 10/3/22 for the left plantar wound on 1st MT. He then had a bilateral GLYNN/EIA stents for bilateral iliac high grade stenoses. He states he is doing better and the left plantar wound is almost closed. He is on ASA/Plavix. He is still smoking, however. States that the left foot is warm, and perfused. Current Outpatient Medications:     lisinopril (PRINIVIL;ZESTRIL) 10 MG tablet, Take 10 mg by mouth daily, Disp: , Rfl:     amitriptyline (ELAVIL) 50 MG tablet, Take 50 mg by mouth nightly, Disp: , Rfl:     sucralfate (CARAFATE) 1 GM tablet, Take 1 g by mouth 4 times daily, Disp: , Rfl:     collagenase 250 UNIT/GM ointment, Apply topically daily Apply topically daily to left foot wound on dorsum of foot., Disp: , Rfl:     acetaminophen (TYLENOL) 325 MG tablet, Take 650 mg by mouth every 6 hours as needed for Pain, Disp: , Rfl:     gabapentin (NEURONTIN) 300 MG capsule, Take 300 mg by mouth 3 times daily. , Disp: , Rfl:     clopidogrel (PLAVIX) 75 MG tablet, Take 75 mg by mouth daily, Disp: , Rfl:     aspirin 81 MG EC tablet, Take by mouth daily, Disp: , Rfl:     cyclobenzaprine (FLEXERIL) 10 MG tablet, Take 5 mg by mouth 3 times daily as needed, Disp: , Rfl:     lidocaine (LIDODERM) 5 %, Apply topically, Disp: , Rfl:     Menthol, Topical Analgesic, 4 % GEL, Apply topically, Disp: , Rfl:     melatonin 3 MG TABS tablet, Take 2 tablets by mouth nightly as needed (sleep), Disp: 10 tablet, Rfl: 0    thiamine 100 MG tablet, Take 1 tablet by mouth daily, Disp: 30 tablet, Rfl: 0    Multiple Vitamin (MULTIVITAMIN) TABS tablet, Take 1 tablet by mouth daily, Disp: 30 tablet, Rfl: 0    pantoprazole (PROTONIX) 20 MG tablet, Take 2 tablets by mouth daily for 30 doses, Disp: 60 tablet, Rfl: 0    No Known Allergies    Past Medical History  Sarah Lady  has a past medical history of Alcoholic hepatitis without ascites, Anemia, unspecified, Anxiety disorder, Arthritis, 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 with ulceration of other part of foot (HCC), Chronic GERD, Cocaine abuse without complication ([address] 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, subsequent encounter ([facility] [address] [address] Difficulty in walking, not elsewhere classified, Family history of tobacco abuse and dependence, Folate-deficiency anemia, Frostbite with tissue necrosis of right foot, subsequent encounter, Gangrene ([address] Gastroesophageal reflux disease without esophagitis, Hyperglycemia, Hyperlipemia, Hypo-osmolality and hyponatremia, Infection of amputation stump, unspecified extremity ([address] Inflammatory polyneuropathy ([address] Major depressive disorder, recurrent episode, moderate ([address] Muscle weakness (generalized), Other idiopathic peripheral autonomic neuropathy, Other injury of unspecified body region, initial encounter, Peripheral vascular angioplasty status, Peripheral vascular disease, unspecified ([address] Phantom limb syndrome with pain ([address] Primary hypertension, Pseudomonas (aeruginosa) (mallei) (pseudomallei) as the cause of diseases classified elsewhere, Severe sepsis without septic shock ([address] Substance induced mood disorder ([address] Superficial frostbite of right foot, initial encounter, Tobacco abuse, Uncomplicated alcohol abuse, Unilateral osteoarthritis of hip, right, Unilateral recurrent inguinal hernia with obstruction but no gangrene, Unspecified soft tissue disorder related to use, overuse and pressure, right ankle and foot, and Unsteadiness on feet. Social History  Sarah Scott  reports that he has been smoking cigarettes. He has a 47.00 pack-year smoking history. He has never used smokeless tobacco. He reports current alcohol use. He reports that he does not currently use drugs after having used the following drugs: Cocaine. Frequency: 1.00 time per week. Family History  Sarah Scott family history includes Cancer in his father; Heart Attack in his mother. There is no family history of bicuspid aortic valve, aneurysms, heart transplant, pacemakers, defibrillators, or sudden cardiac death. Past Surgical History   Past Surgical History:   Procedure Laterality Date    LEG SURGERY Right     aka       Subjective:     Review of Systems  Negative except for HPI      Objective:     /76   Pulse 92   Ht 5' 6\" (1.676 m)   Wt 147 lb (66.7 kg)   BMI 23.73 kg/m²     Wt Readings from Last 3 Encounters:   11/28/22 147 lb (66.7 kg)   11/14/22 148 lb (67.1 kg)   10/24/22 155 lb (70.3 kg)     BP Readings from Last 3 Encounters:   11/28/22 127/76   11/14/22 (!) 166/72   11/07/22 (!) 156/76       Physical Exam  Bilateral femoral areas soft, no hematomas. Left lower extremity   Foot wound much smaller; clean.   Doppler Biphasic DP/TP      Lab Results   Component Value Date/Time    WBC 10.2 11/14/2022 09:12 AM    RBC 4.62 11/14/2022 09:12 AM    HGB 13.2 11/14/2022 09:12 AM    HCT 39.1 11/14/2022 09:12 AM    MCV 84.6 11/14/2022 09:12 AM    MCH 28.6 11/14/2022 09:12 AM    MCHC 33.8 11/14/2022 09:12 AM    RDW 13.7 06/27/2017 07:34 AM     11/14/2022 09:12 AM    MPV 9.2 11/14/2022 09:12 AM       Lab Results   Component Value Date/Time     11/14/2022 09:12 AM    K 4.5 11/14/2022 09:12 AM    K 3.2 01/11/2022 06:19 AM    CL 96 11/14/2022 09:12 AM    CO2 23 11/14/2022 09:12 AM    BUN 5 11/14/2022 09:12 AM    LABALBU 2.4 01/15/2022 05:41 AM    CREATININE 0.5 11/14/2022 09:12 AM    CALCIUM 9.8 11/14/2022 09:12 AM    LABGLOM >60 11/14/2022 09:03 AM    GLUCOSE 87 11/14/2022 09:12 AM       Lab Results   Component Value Date/Time    ALKPHOS 35 01/15/2022 05:41 AM    ALT 26 01/15/2022 05:41 AM    AST 39 01/15/2022 05:41 AM    PROT 5.6 01/15/2022 05:41 AM    BILITOT 0.5 01/15/2022 05:41 AM    BILIDIR 0.6 01/10/2022 02:30 PM    LABALBU 2.4 01/15/2022 05:41 AM       Lab Results   Component Value Date/Time    MG 1.4 01/20/2022 11:14 AM       Lab Results   Component Value Date    INR 1.03 11/14/2022    INR 1.00 10/03/2022    INR 1.56 (H) 01/12/2022         No results found for: LABA1C    No results found for: TRIG, HDL, LDLCALC, LDLDIRECT, LABVLDL    Lab Results   Component Value Date/Time    TSH 1.650 06/27/2017 07:34 AM           Assessment/Plan     1. PVD (peripheral vascular disease) with claudication ([address]    Doing well. Plan:  Continue wound care. Continue ASA/Plavix indefinitely. Tobacco cessation offered. F/U arterial duplex left lower extremity in 4 weeks and followup in clinic to review results. Orders Placed This Encounter   Procedures    VL DUP LOWER EXTREMITY ARTERIES LEFT     No orders of the defined types were placed in this encounter. No follow-ups on file.       Electronically signed by Alistair Gupta MD   11/29/2022 at 10:16 PM EST

## 2023-05-08 ENCOUNTER — HOSPITAL ENCOUNTER (OUTPATIENT)
Age: 63
Setting detail: SPECIMEN
Discharge: HOME | End: 2023-05-08

## 2023-05-08 LAB
ALBUMIN SERPL-MCNC: 4.2 G/DL (ref 3.5–5.2)
ALBUMIN/GLOBULIN RATIO: 1.2 (ref 1–2.5)
ALP SERPL-CCNC: 99 U/L (ref 40–129)
ALT SERPL-CCNC: 17 U/L (ref 5–41)
ANION GAP SERPL CALCULATED.3IONS-SCNC: 17 MMOL/L (ref 9–17)
AST SERPL-CCNC: 17 U/L
BILIRUB SERPL-MCNC: 0.2 MG/DL (ref 0.3–1.2)
BUN SERPL-MCNC: 9 MG/DL (ref 8–23)
CALCIUM SERPL-MCNC: 9.7 MG/DL (ref 8.6–10.4)
CHLORIDE SERPL-SCNC: 96 MMOL/L (ref 98–107)
CHOLEST SERPL-MCNC: 154 MG/DL
CHOLESTEROL/HDL RATIO: 5.3
CO2 SERPL-SCNC: 21 MMOL/L (ref 20–31)
CREAT SERPL-MCNC: 0.72 MG/DL (ref 0.7–1.2)
GFR SERPL CREATININE-BSD FRML MDRD: >60 ML/MIN/1.73M2
GLUCOSE SERPL-MCNC: 164 MG/DL (ref 70–99)
HCT VFR BLD AUTO: 41.7 % (ref 40.7–50.3)
HDLC SERPL-MCNC: 29 MG/DL
HGB BLD-MCNC: 13.1 G/DL (ref 13–17)
LDLC SERPL CALC-MCNC: ABNORMAL MG/DL (ref 0–130)
LDLC SERPL DIRECT ASSAY-MCNC: 83 MG/DL
MCH RBC QN AUTO: 26 PG (ref 25.2–33.5)
MCHC RBC AUTO-ENTMCNC: 31.4 G/DL (ref 28.4–34.8)
MCV RBC AUTO: 82.7 FL (ref 82.6–102.9)
NRBC AUTOMATED: 0 PER 100 WBC
PDW BLD-RTO: 15.9 % (ref 11.8–14.4)
PLATELET # BLD AUTO: 356 K/UL (ref 138–453)
PMV BLD AUTO: 10.5 FL (ref 8.1–13.5)
POTASSIUM SERPL-SCNC: 4.1 MMOL/L (ref 3.7–5.3)
PROSTATE SPECIFIC ANTIGEN: 0.7 NG/ML
PROT SERPL-MCNC: 7.8 G/DL (ref 6.4–8.3)
RBC # BLD: 5.04 M/UL (ref 4.21–5.77)
SODIUM SERPL-SCNC: 134 MMOL/L (ref 135–144)
TRIGL SERPL-MCNC: 411 MG/DL
WBC # BLD AUTO: 9.7 K/UL (ref 3.5–11.3)

## 2023-05-23 ENCOUNTER — HOSPITAL ENCOUNTER (OUTPATIENT)
Dept: PHYSICAL THERAPY | Age: 63
Setting detail: THERAPIES SERIES
Discharge: HOME | End: 2023-05-23
Payer: COMMERCIAL

## 2023-05-23 PROCEDURE — 97163 PT EVAL HIGH COMPLEX 45 MIN: CPT

## 2023-05-23 NOTE — DISCHARGE SUMMARY
** PLEASE SIGN, DATE AND TIME CERTIFICATION BELOW AND RETURN TO [facility] OUTPATIENT REHABILITATION (FAX #: [phone]). ATTEST/CO-SIGN IF ACCESSING VIA [employer]. THANK YOU.**    I certify that I have examined the patient below and determined that Physical Medicine and Rehabilitation service is necessary and that I approve the established plan of care for up to 90 days or as specifically noted. Attestation, signature or co-signature of physician indicates approval of certification requirements.    ________________________ ____________ __________  Physician Signature   Date   Time  [facility]  PHYSICAL THERAPY  [x] EVALUATION  [] DAILY NOTE (LAND) [] DAILY NOTE (AQUATIC ) [] PROGRESS NOTE [] DISCHARGE NOTE    [x] [address] [facility]   [] [place] [address]    [] [facility]   [] [facility]    Date: 2023  Patient Name:  Marcia Galeano  : 1960  MRN: [id number]  CSN: [id number]    Referring Practitioner James Cadena*   Diagnosis Acquired absence of limb, unspecified [Z89.9]    Date of Evaluation 23    Additional Pertinent History HTN      Functional Outcome Measure Used Amputee Mobility Predictor   Functional Outcome Score  (23)       Insurance: Primary: Payor: [address] [place] [address] [address] /  /  / ,   Secondary:    Authorization Information: PRECERTIFICATION REQUIRED: No  INSURANCE THERAPY BENEFIT: No pre-certification is needed. PT, OT and ST are allowed 30 visits each per calendar year. AQUATIC THERAPY COVERED:  Yes   MODALITIES COVERED:  Yes--Iontophoresis is not covered. Hot/Cold Packs are not covered. Visit # 1, 1/10 for progress note   Visits Allowed: 30 visits   Recertification Date: 28   Physician Follow-Up: 23   Physician Orders:    History of Present Illness: Patient reports that he had a R above knee amputation done 1.5-2 years ago after a frostbite injury and infection.